# Patient Record
Sex: FEMALE | Race: WHITE
[De-identification: names, ages, dates, MRNs, and addresses within clinical notes are randomized per-mention and may not be internally consistent; named-entity substitution may affect disease eponyms.]

---

## 2018-12-13 ENCOUNTER — HOSPITAL ENCOUNTER (EMERGENCY)
Dept: HOSPITAL 8 - ED | Age: 38
Discharge: HOME | End: 2018-12-13
Payer: MEDICAID

## 2018-12-13 VITALS — SYSTOLIC BLOOD PRESSURE: 153 MMHG | DIASTOLIC BLOOD PRESSURE: 76 MMHG

## 2018-12-13 VITALS — WEIGHT: 257.94 LBS | HEIGHT: 68 IN | BODY MASS INDEX: 39.09 KG/M2

## 2018-12-13 DIAGNOSIS — M79.642: ICD-10-CM

## 2018-12-13 DIAGNOSIS — E11.9: ICD-10-CM

## 2018-12-13 DIAGNOSIS — L03.114: ICD-10-CM

## 2018-12-13 DIAGNOSIS — L03.113: Primary | ICD-10-CM

## 2018-12-13 DIAGNOSIS — M79.641: ICD-10-CM

## 2018-12-13 LAB
ALBUMIN SERPL-MCNC: 3.1 G/DL (ref 3.4–5)
ALP SERPL-CCNC: 80 U/L (ref 45–117)
ALT SERPL-CCNC: 27 U/L (ref 12–78)
ANION GAP SERPL CALC-SCNC: 8 MMOL/L (ref 5–15)
BASOPHILS # BLD AUTO: 0.03 X10^3/UL (ref 0–0.1)
BASOPHILS NFR BLD AUTO: 0 % (ref 0–1)
BILIRUB SERPL-MCNC: 0.2 MG/DL (ref 0.2–1)
CALCIUM SERPL-MCNC: 8.7 MG/DL (ref 8.5–10.1)
CHLORIDE SERPL-SCNC: 105 MMOL/L (ref 98–107)
CREAT SERPL-MCNC: 0.83 MG/DL (ref 0.55–1.02)
EOSINOPHIL # BLD AUTO: 0.18 X10^3/UL (ref 0–0.4)
EOSINOPHIL NFR BLD AUTO: 2 % (ref 1–7)
ERYTHROCYTE [DISTWIDTH] IN BLOOD BY AUTOMATED COUNT: 13.2 % (ref 9.6–15.2)
LYMPHOCYTES # BLD AUTO: 1.88 X10^3/UL (ref 1–3.4)
LYMPHOCYTES NFR BLD AUTO: 23 % (ref 22–44)
MCH RBC QN AUTO: 29.2 PG (ref 27–34.8)
MCHC RBC AUTO-ENTMCNC: 33.7 G/DL (ref 32.4–35.8)
MCV RBC AUTO: 86.6 FL (ref 80–100)
MD: (no result)
MONOCYTES # BLD AUTO: 0.59 X10^3/UL (ref 0.2–0.8)
MONOCYTES NFR BLD AUTO: 7 % (ref 2–9)
NEUTROPHILS # BLD AUTO: 5.59 X10^3/UL (ref 1.8–6.8)
NEUTROPHILS NFR BLD AUTO: 68 % (ref 42–75)
PLATELET # BLD AUTO: 253 X10^3/UL (ref 130–400)
PMV BLD AUTO: 9.4 FL (ref 7.4–10.4)
PROT SERPL-MCNC: 6.9 G/DL (ref 6.4–8.2)
RBC # BLD AUTO: 3.77 X10^6/UL (ref 3.82–5.3)

## 2018-12-13 PROCEDURE — 80053 COMPREHEN METABOLIC PANEL: CPT

## 2018-12-13 PROCEDURE — 85025 COMPLETE CBC W/AUTO DIFF WBC: CPT

## 2018-12-13 PROCEDURE — 99284 EMERGENCY DEPT VISIT MOD MDM: CPT

## 2018-12-13 PROCEDURE — 36415 COLL VENOUS BLD VENIPUNCTURE: CPT

## 2019-10-27 ENCOUNTER — HOSPITAL ENCOUNTER (EMERGENCY)
Facility: MEDICAL CENTER | Age: 39
End: 2019-10-28
Attending: EMERGENCY MEDICINE
Payer: MEDICAID

## 2019-10-27 DIAGNOSIS — R06.2 WHEEZING: ICD-10-CM

## 2019-10-27 DIAGNOSIS — J40 BRONCHITIS: ICD-10-CM

## 2019-10-27 PROCEDURE — 99284 EMERGENCY DEPT VISIT MOD MDM: CPT

## 2019-10-28 ENCOUNTER — APPOINTMENT (OUTPATIENT)
Dept: RADIOLOGY | Facility: MEDICAL CENTER | Age: 39
End: 2019-10-28
Attending: EMERGENCY MEDICINE
Payer: MEDICAID

## 2019-10-28 VITALS
BODY MASS INDEX: 38.76 KG/M2 | OXYGEN SATURATION: 98 % | RESPIRATION RATE: 18 BRPM | WEIGHT: 255.73 LBS | TEMPERATURE: 98.9 F | SYSTOLIC BLOOD PRESSURE: 147 MMHG | HEIGHT: 68 IN | HEART RATE: 95 BPM | DIASTOLIC BLOOD PRESSURE: 58 MMHG

## 2019-10-28 LAB
FLUAV RNA SPEC QL NAA+PROBE: NEGATIVE
FLUBV RNA SPEC QL NAA+PROBE: NEGATIVE

## 2019-10-28 PROCEDURE — 87502 INFLUENZA DNA AMP PROBE: CPT

## 2019-10-28 PROCEDURE — 700111 HCHG RX REV CODE 636 W/ 250 OVERRIDE (IP): Performed by: EMERGENCY MEDICINE

## 2019-10-28 PROCEDURE — 700101 HCHG RX REV CODE 250: Performed by: EMERGENCY MEDICINE

## 2019-10-28 PROCEDURE — 94640 AIRWAY INHALATION TREATMENT: CPT

## 2019-10-28 PROCEDURE — 71046 X-RAY EXAM CHEST 2 VIEWS: CPT

## 2019-10-28 RX ORDER — IPRATROPIUM BROMIDE AND ALBUTEROL SULFATE 2.5; .5 MG/3ML; MG/3ML
3 SOLUTION RESPIRATORY (INHALATION)
Status: COMPLETED | OUTPATIENT
Start: 2019-10-28 | End: 2019-10-28

## 2019-10-28 RX ORDER — PREDNISONE 20 MG/1
40 TABLET ORAL DAILY
Qty: 10 TAB | Refills: 0 | Status: SHIPPED | OUTPATIENT
Start: 2019-10-28 | End: 2019-11-02

## 2019-10-28 RX ORDER — ALBUTEROL SULFATE 90 UG/1
2 AEROSOL, METERED RESPIRATORY (INHALATION) EVERY 6 HOURS PRN
Qty: 8.5 G | Refills: 0 | Status: SHIPPED | OUTPATIENT
Start: 2019-10-28 | End: 2021-08-23

## 2019-10-28 RX ORDER — PREDNISONE 20 MG/1
60 TABLET ORAL ONCE
Status: COMPLETED | OUTPATIENT
Start: 2019-10-28 | End: 2019-10-28

## 2019-10-28 RX ADMIN — IPRATROPIUM BROMIDE AND ALBUTEROL SULFATE 3 ML: .5; 3 SOLUTION RESPIRATORY (INHALATION) at 00:16

## 2019-10-28 RX ADMIN — PREDNISONE 60 MG: 20 TABLET ORAL at 00:23

## 2019-10-28 NOTE — ED NOTES
Patient walked with a steady gate at this time to er [facility] area room  65. Placed patient into gown, gave warm blanket, and call light. Ready to be seen

## 2019-10-28 NOTE — ED PROVIDER NOTES
"ED Provider Note    CHIEF COMPLAINT  Chief Complaint   Patient presents with   • Cough     productive cough x 2 days       HPI  Jc Ramos is a 39 y.o. female who presents to the emergency department chief complaint of cough nasal congestion and fevers over the last 2 to 3 days.  Patient is an active smoker and used to use an inhaler but states she lost it.  She is been having a productive cough but is been no blood in the productive it is mostly clear phlegm.  She denies any chest pain any nausea or vomiting she is feeling fatigued and having body aches.  She did not get her flu shot.  She states that she has coughing attacks feels a little short of breath but otherwise she is breathing normally    REVIEW OF SYSTEMS  Positives as above. Pertinent negatives include ear pain throat pain nausea vomiting diarrhea rash leg swelling chest pain  All other review of systems are negative    PAST MEDICAL HISTORY   has a past medical history of Diabetes.    SOCIAL HISTORY  Social History     Tobacco Use   • Smoking status: Current Every Day Smoker   • Tobacco comment: 1/2 ppd since age 16   Substance and Sexual Activity   • Alcohol use: No   • Drug use: No   • Sexual activity: Not on file       SURGICAL HISTORY   has a past surgical history that includes cholecystectomy and c-sec only,prev c-sec.    CURRENT MEDICATIONS  Home Medications    **Home medications have not yet been reviewed for this encounter**         ALLERGIES  Allergies   Allergen Reactions   • Vicodin [Hydrocodone-Acetaminophen] Vomiting       PHYSICAL EXAM  VITAL SIGNS: BP (!) 164/95   Pulse (!) 124   Temp 37.2 °C (98.9 °F) (Oral)   Resp 18   Ht 1.727 m (5' 8\")   Wt 116 kg (255 lb 11.7 oz)   LMP 10/13/2019 (Approximate)   SpO2 98%   BMI 38.88 kg/m²    Pulse ox interpretation: I interpret this pulse ox as normal.  Constitutional: Alert in no apparent distress.  HENT: Normocephalic atraumatic, MMM, oropharynx clear uvula midline bilateral copious " nasal congestion  Eyes: PER, Conjunctiva normal, Non-icteric.   Neck: Normal range of motion, No tenderness, Supple, No stridor.   Cardiovascular: Regular rate and rhythm, no murmurs.   Thorax & Lungs: Coarse wheezes heard bilaterally with a productive cough no respiratory distress, No chest tenderness.   Abdomen: Bowel sounds normal, Soft, No tenderness, No pulsatile masses. No peritoneal signs.  Skin: Warm, Dry, No erythema, No rash.   Back: No bony tenderness, No CVA tenderness.   Extremities: Intact distal pulses, No edema, No tenderness, No cyanosis  Neurologic: Alert and oriented x3, No focal deficits noted.       DIFFERENTIAL DIAGNOSIS AND WORK UP PLAN    This is a 39 y.o. female who presents with history of tobacco use coarse wheezes and cough that is productive consistent most likely with bronchitis versus community acquired pneumonia versus influenza.  She is tachycardic but not hypoxic and on respiratory distress.  Will treat the patient with a DuoNeb evaluate for influenza and a chest x-ray. Low concern for the cough or tachycardia to be related to a pulmonary embolism especially in light of her generalized viral symptoms and wheezing heard on examination    DIAGNOSTIC STUDIES / PROCEDURES    LABS  Pertinent Lab Findings    Labs Reviewed   INFLUENZA A/B BY PCR       RADIOLOGY  DX-CHEST-2 VIEWS   Final Result      Normal chest.               INTERPRETING LOCATION: [address] [place] [address], [facility]        The radiologist's interpretation of all radiological studies have been reviewed by me.      COURSE & MEDICAL DECISION MAKING  Pertinent Labs & Imaging studies reviewed. (See chart for details)    1:03 AM  Reassessed patient at the bedside she is doing much better wheezing is actually gotten a little bit louder but more clear and she is moving more air bilaterally.  She still has a cough but states she is feeling better.  She will be sent home with steroid pulse as well as handheld albuterol for bronchitis we  "discussed the use of non-antibiotics at this point and she will return to the ED if symptoms are not starting to improve in the next 2 to 3 days.  She understands the cough may linger for 7 to 10 days as long she is beginning to feel less short of breath then she does not need to return    /58   Pulse 95   Temp 37.2 °C (98.9 °F) (Oral)   Resp 18   Ht 1.727 m (5' 8\")   Wt 116 kg (255 lb 11.7 oz)   LMP 10/13/2019 (Approximate)   SpO2 98%   BMI 38.88 kg/m²     The patient will return for new or worsening symptoms and is stable at the time of discharge.    The patient is referred to a primary physician for blood pressure management, diabetic screening, and for all other preventative health concerns.    DISPOSITION:  Patient will be discharged home in stable condition.    FOLLOW UP:  [facility], Emergency Dept  [address] [facility] [place] [address] [phone]  In 2 days  If symptoms worsen      OUTPATIENT MEDICATIONS:  Discharge Medication List as of 10/28/2019  1:07 AM      START taking these medications    Details   predniSONE (DELTASONE) 20 MG Tab Take 2 Tabs by mouth every day for 5 days., Disp-10 Tab, R-0, Print Rx Paper      albuterol 108 (90 Base) MCG/ACT Aero Soln inhalation aerosol Inhale 2 Puffs by mouth every 6 hours as needed for Shortness of Breath., Disp-8.5 g, R-0, Print Rx Paper               FINAL IMPRESSION  1. Bronchitis     2. Wheezing             Electronically signed by: Ava Ledbetter, 10/27/2019 11:57 PM    This dictation has been created using voice recognition software and/or scribes. The accuracy of the dictation is limited by the abilities of the software and the expertise of the scribes. I expect there may be some errors of grammar and possibly content. I made every attempt to manually correct the errors within my dictation. However, errors related to voice recognition software and/or scribes may still exist and should be interpreted within the " appropriate context.

## 2019-10-28 NOTE — ED NOTES
Pt given discharge instructions. Medication education provided.  Signed copy in chart. Pt states all belongings in possession. Pt ambulatory off unit with steady gait.

## 2019-11-04 ENCOUNTER — APPOINTMENT (OUTPATIENT)
Dept: RADIOLOGY | Facility: MEDICAL CENTER | Age: 39
End: 2019-11-04
Attending: EMERGENCY MEDICINE
Payer: MEDICAID

## 2019-11-04 ENCOUNTER — HOSPITAL ENCOUNTER (EMERGENCY)
Facility: MEDICAL CENTER | Age: 39
End: 2019-11-04
Attending: EMERGENCY MEDICINE
Payer: MEDICAID

## 2019-11-04 VITALS
HEIGHT: 68 IN | DIASTOLIC BLOOD PRESSURE: 97 MMHG | OXYGEN SATURATION: 96 % | WEIGHT: 255.73 LBS | BODY MASS INDEX: 38.76 KG/M2 | TEMPERATURE: 98.2 F | HEART RATE: 94 BPM | SYSTOLIC BLOOD PRESSURE: 159 MMHG | RESPIRATION RATE: 18 BRPM

## 2019-11-04 DIAGNOSIS — R51.9 ACUTE NONINTRACTABLE HEADACHE, UNSPECIFIED HEADACHE TYPE: ICD-10-CM

## 2019-11-04 DIAGNOSIS — J06.9 VIRAL URI WITH COUGH: ICD-10-CM

## 2019-11-04 PROCEDURE — 700102 HCHG RX REV CODE 250 W/ 637 OVERRIDE(OP): Performed by: EMERGENCY MEDICINE

## 2019-11-04 PROCEDURE — 71045 X-RAY EXAM CHEST 1 VIEW: CPT

## 2019-11-04 PROCEDURE — A9270 NON-COVERED ITEM OR SERVICE: HCPCS | Performed by: EMERGENCY MEDICINE

## 2019-11-04 PROCEDURE — 99284 EMERGENCY DEPT VISIT MOD MDM: CPT

## 2019-11-04 RX ORDER — BENZONATATE 100 MG/1
200 CAPSULE ORAL 3 TIMES DAILY PRN
Qty: 20 CAP | Refills: 0 | Status: SHIPPED | OUTPATIENT
Start: 2019-11-04 | End: 2021-08-23

## 2019-11-04 RX ORDER — BUTALBITAL, ACETAMINOPHEN AND CAFFEINE 50; 325; 40 MG/1; MG/1; MG/1
1 TABLET ORAL ONCE
Status: COMPLETED | OUTPATIENT
Start: 2019-11-04 | End: 2019-11-04

## 2019-11-04 RX ADMIN — BUTALBITAL, ACETAMINOPHEN, AND CAFFEINE 1 TABLET: 50; 325; 40 TABLET ORAL at 13:31

## 2019-11-04 NOTE — ED PROVIDER NOTES
"ED Provider Note    CHIEF COMPLAINT  Chief Complaint   Patient presents with   • Cough     for the last wk, productive green    • Headache   • Cold Symptoms       HPI  Jc Ramos is a 39 y.o. female who returns with cough, headache, nasal congestion over the past week, was evaluated here at the onset of her symptoms, had a negative flu swab and a negative chest x-ray.  She states the cough is continuing and actually worsening, productive green sputum.  She denies fever.  Some nausea but no vomiting or abdominal pain.  No back pain.  States the headache is worse when coughing.  No weakness numbness or tingling.  No visual changes.  History of diabetes, admits to poor compliance.    REVIEW OF SYSTEMS  Negative for fever, rash, chest pain, abdominal pain, vomiting, diarrhea, focal weakness, focal numbness, focal tingling, back pain. All other systems are negative.     PAST MEDICAL HISTORY  Past Medical History:   Diagnosis Date   • Diabetes        FAMILY HISTORY  History reviewed. No pertinent family history.    SOCIAL HISTORY  Social History     Tobacco Use   • Smoking status: Current Every Day Smoker     Packs/day: 0.50     Types: Cigarettes   • Smokeless tobacco: Never Used   • Tobacco comment: 1/2 ppd since age 16   Substance Use Topics   • Alcohol use: No   • Drug use: No       SURGICAL HISTORY  Past Surgical History:   Procedure Laterality Date   • CHOLECYSTECTOMY     • PB C-SEC ONLY,PBEV C-SEC         CURRENT MEDICATIONS  I personally reviewed the medication list in the charting documentation.     ALLERGIES  Allergies   Allergen Reactions   • Vicodin [Hydrocodone-Acetaminophen] Vomiting       MEDICAL RECORD  I have reviewed patient's medical record and pertinent results are listed above.      PHYSICAL EXAM  VITAL SIGNS: BP (!) 163/100   Pulse (!) 106   Temp 36.8 °C (98.2 °F) (Temporal)   Resp 18   Ht 1.727 m (5' 8\")   Wt 116 kg (255 lb 11.7 oz)   LMP 10/13/2019 (Approximate)   SpO2 93%   BMI 38.88 " kg/m²    Constitutional: Well appearing patient in no acute distress.  Not toxic, nor ill in appearance.  HENT: Mucus membranes moist.  No asymmetric pharyngeal edema, no exudates  Eyes: No scleral icterus. Normal conjunctiva   Neck: Supple, comfortable, nonpainful range of motion.   Cardiovascular: Minimal tachycardia, regular  Thorax & Lungs: Chest is nontender.  Lungs are clear to auscultation with good air movement bilaterally.  No wheeze, rhonchi, nor rales.   Abdomen: Soft, with no tenderness, rebound nor guarding.  No mass or pulsatile mass appreciated.  Skin: Warm, dry. No rash appreciated  Extremities/Musculoskeletal: No sign of trauma. No asymmetric calf tenderness, erythema or edema. Normal range of motion   Neurologic: Alert & oriented. No focal deficits observed.   Psychiatric: Normal affect appropriate for the clinical situation.    DIAGNOSTIC STUDIES / PROCEDURES    RADIOLOGY  DX-CHEST-LIMITED (1 VIEW)   Final Result      No acute cardiopulmonary disease.            COURSE & MEDICAL DECISION MAKING  I have reviewed any medical record information, laboratory studies and radiographic results as noted above.    Encounter Summary: This is a 39 y.o. female with a viral URI, this is including a cough, x-ray is negative for pneumonia.  She was negative for the flu a week ago.  At this point I treated with Fioricet for headache, I will prescribe Aleve-D as well as Tessalon for symptom control.  I have gone over strict return instructions and at this time she is stable and appropriate for discharge.      DISPOSITION: Discharged home in stable condition      FINAL IMPRESSION  1. Viral URI with cough    2. Acute nonintractable headache, unspecified headache type           This dictation was created using voice recognition software. The accuracy of the dictation is limited to the abilities of the software. I expect there may be some errors of grammar and possibly content. The nursing notes were reviewed and  certain aspects of this information were incorporated into this note.    Electronically signed by: Sanjiv Mathis, 11/4/2019 1:23 PM

## 2019-11-04 NOTE — ED NOTES
No acute change in condition since last entry.  Safety maintained.  Pt has been medicated per mar.

## 2019-11-04 NOTE — ED TRIAGE NOTES
Pt ambulated to triage with   Chief Complaint   Patient presents with   • Cough     for the last wk, productive green    • Headache      Pt Informed regarding triage process and verbalized understanding to inform triage tech or RN for any changes in condition. Placed in lobby.

## 2019-11-05 ENCOUNTER — PATIENT OUTREACH (OUTPATIENT)
Dept: HEALTH INFORMATION MANAGEMENT | Facility: OTHER | Age: 39
End: 2019-11-05

## 2019-11-05 ENCOUNTER — HOSPITAL ENCOUNTER (EMERGENCY)
Dept: HOSPITAL 8 - ED | Age: 39
LOS: 1 days | Discharge: HOME | End: 2019-11-06
Payer: MEDICAID

## 2019-11-05 VITALS — HEIGHT: 68 IN | WEIGHT: 255.52 LBS | BODY MASS INDEX: 38.72 KG/M2

## 2019-11-05 VITALS — DIASTOLIC BLOOD PRESSURE: 90 MMHG | SYSTOLIC BLOOD PRESSURE: 165 MMHG

## 2019-11-05 DIAGNOSIS — F17.200: ICD-10-CM

## 2019-11-05 DIAGNOSIS — E11.9: ICD-10-CM

## 2019-11-05 DIAGNOSIS — Z90.49: ICD-10-CM

## 2019-11-05 DIAGNOSIS — J18.0: Primary | ICD-10-CM

## 2019-11-05 DIAGNOSIS — M79.10: ICD-10-CM

## 2019-11-05 PROCEDURE — 71046 X-RAY EXAM CHEST 2 VIEWS: CPT

## 2019-11-05 PROCEDURE — 99283 EMERGENCY DEPT VISIT LOW MDM: CPT

## 2019-11-05 PROCEDURE — 94640 AIRWAY INHALATION TREATMENT: CPT

## 2019-11-05 NOTE — PROGRESS NOTES
CHW Timothy ([phone]) reached out to patient offering connection to community resources as well as scheduling and establishing with a PCP. Pt did not answer, she has been left VM with CHW's information to call and connect with PCP and any resources she may need.

## 2019-11-06 NOTE — NUR
TASK RN: PT MEDICATED PER EMAR. REPORTS IMPROVEMENT IN WOB/SOB WITH BREATHING 
TX. AWAITING DC INSTRUCTIONS

## 2019-11-06 NOTE — NUR
TASK RN: PT REPORTS HAVING TAKEN ZITHROMAX BEFORE WITHOUT DIFFICULTY. PT 
REQUESTING DC. AWARE OF S/S OF ABX RXN. 

DC EDUCATION PROVIDED, PT DEMONSTRATES UNDERSTANDING. PT AMBULATED STEADILY TO 
DC WITH RN

## 2019-11-08 NOTE — PROGRESS NOTES
CHW made 2nd follow-up call to patient reaching out for services. She would benefit from regular PCP visits, and CHW has left information on importance of establishing PCP and instructions to call CHW back for resources.

## 2019-11-13 NOTE — PROGRESS NOTES
CHW left 3rd VM for patient offering assistance to PCP, other follow-up appointments, and community resources she may benefit from. Jc was given CHW information to call back if interested in services.   This was the 3rd attempt to reach patient; she will be discharged from current CHW list unless patient reaches-out for services or presents again in the ED.

## 2020-02-07 ENCOUNTER — HOSPITAL ENCOUNTER (EMERGENCY)
Dept: HOSPITAL 8 - ED | Age: 40
Discharge: HOME | End: 2020-02-07
Payer: MEDICAID

## 2020-02-07 VITALS — WEIGHT: 268.96 LBS | HEIGHT: 68 IN | BODY MASS INDEX: 40.76 KG/M2

## 2020-02-07 VITALS — SYSTOLIC BLOOD PRESSURE: 193 MMHG | DIASTOLIC BLOOD PRESSURE: 97 MMHG

## 2020-02-07 DIAGNOSIS — K08.89: Primary | ICD-10-CM

## 2020-02-07 DIAGNOSIS — E11.65: ICD-10-CM

## 2020-02-07 DIAGNOSIS — Z90.49: ICD-10-CM

## 2020-02-07 DIAGNOSIS — F17.200: ICD-10-CM

## 2020-02-07 PROCEDURE — 99283 EMERGENCY DEPT VISIT LOW MDM: CPT

## 2020-02-07 PROCEDURE — 82962 GLUCOSE BLOOD TEST: CPT

## 2020-02-07 PROCEDURE — 96372 THER/PROPH/DIAG INJ SC/IM: CPT

## 2020-05-22 ENCOUNTER — HOSPITAL ENCOUNTER (EMERGENCY)
Facility: MEDICAL CENTER | Age: 40
End: 2020-05-22
Attending: EMERGENCY MEDICINE
Payer: MEDICAID

## 2020-05-22 VITALS
BODY MASS INDEX: 41.43 KG/M2 | HEART RATE: 99 BPM | SYSTOLIC BLOOD PRESSURE: 168 MMHG | HEIGHT: 68 IN | RESPIRATION RATE: 18 BRPM | WEIGHT: 273.37 LBS | OXYGEN SATURATION: 98 % | TEMPERATURE: 98.1 F | DIASTOLIC BLOOD PRESSURE: 92 MMHG

## 2020-05-22 DIAGNOSIS — K04.7 DENTAL ABSCESS: ICD-10-CM

## 2020-05-22 PROCEDURE — A9270 NON-COVERED ITEM OR SERVICE: HCPCS | Performed by: EMERGENCY MEDICINE

## 2020-05-22 PROCEDURE — 700102 HCHG RX REV CODE 250 W/ 637 OVERRIDE(OP): Performed by: EMERGENCY MEDICINE

## 2020-05-22 PROCEDURE — 99283 EMERGENCY DEPT VISIT LOW MDM: CPT

## 2020-05-22 RX ORDER — OXYCODONE HYDROCHLORIDE AND ACETAMINOPHEN 5; 325 MG/1; MG/1
1 TABLET ORAL ONCE
Status: COMPLETED | OUTPATIENT
Start: 2020-05-22 | End: 2020-05-22

## 2020-05-22 RX ORDER — OXYCODONE HYDROCHLORIDE AND ACETAMINOPHEN 5; 325 MG/1; MG/1
1 TABLET ORAL EVERY 4 HOURS PRN
Qty: 15 TAB | Refills: 0 | Status: SHIPPED | OUTPATIENT
Start: 2020-05-22 | End: 2020-05-25

## 2020-05-22 RX ORDER — AMOXICILLIN AND CLAVULANATE POTASSIUM 875; 125 MG/1; MG/1
1 TABLET, FILM COATED ORAL 2 TIMES DAILY
Qty: 14 TAB | Refills: 0 | Status: SHIPPED | OUTPATIENT
Start: 2020-05-22 | End: 2020-05-29

## 2020-05-22 RX ORDER — AMOXICILLIN AND CLAVULANATE POTASSIUM 875; 125 MG/1; MG/1
1 TABLET, FILM COATED ORAL ONCE
Status: COMPLETED | OUTPATIENT
Start: 2020-05-22 | End: 2020-05-22

## 2020-05-22 RX ADMIN — AMOXICILLIN AND CLAVULANATE POTASSIUM 1 TABLET: 875; 125 TABLET, FILM COATED ORAL at 02:33

## 2020-05-22 RX ADMIN — OXYCODONE HYDROCHLORIDE AND ACETAMINOPHEN 1 TABLET: 5; 325 TABLET ORAL at 02:33

## 2020-05-22 NOTE — ED NOTES
Pt medicated per MAR.  Pt discharged home. Explained discharge and medication instructions. Questions and comments addressed. Pt verbalized understanding of instructions. Pt advised to follow-up with PCP or return to ED for any new or worsening of symptoms. Pt is ambulating well and steady on feet. VSS.

## 2020-05-22 NOTE — ED NOTES
Agree with triage note. Pt w/ significant left lower jaw swelling, pain with swallowing. Able to manage secretions. attached to monitor. VSS

## 2020-05-22 NOTE — ED PROVIDER NOTES
"ED Provider Note    CHIEF COMPLAINT  Chief Complaint   Patient presents with   • Oral Swelling     Left lower jaw/ dental swelling for three days, denies trauma to site, pain at site     Seen at 2:32 AM    HPI  Jc Ramos is a 39 y.o. female who presents with few days have left-sided facial swelling and pain secondary to an infected tooth, it abruptly worsened over the past 12 hours.  The patient has been taking Tylenol and ibuprofen with some improvement in the pain but no resolution of the swelling.  She has had this happen in the past and was treated with antibiotics.  She never underwent extraction.  She does not have a dentist or oral surgeon.    She denies any fevers, chills, difficulty opening her mouth or difficulty swallowing.  No history of impaired immunity.    REVIEW OF SYSTEMS  See HPI  PAST MEDICAL HISTORY   has a past medical history of Diabetes.    SOCIAL HISTORY  Social History     Tobacco Use   • Smoking status: Current Every Day Smoker     Packs/day: 0.50     Types: Cigarettes   • Smokeless tobacco: Never Used   • Tobacco comment: 1/2 ppd since age 16   Substance and Sexual Activity   • Alcohol use: No   • Drug use: No   • Sexual activity: Not on file       SURGICAL HISTORY   has a past surgical history that includes cholecystectomy and c-sec only,prev c-sec.    CURRENT MEDICATIONS  Reviewed.  See Encounter Summary.     ALLERGIES  Allergies   Allergen Reactions   • Vicodin [Hydrocodone-Acetaminophen] Vomiting       PHYSICAL EXAM  VITAL SIGNS: BP (!) 168/92   Pulse 99   Temp 36.7 °C (98.1 °F) (Temporal)   Resp 18   Ht 1.727 m (5' 8\")   Wt 124 kg (273 lb 5.9 oz)   SpO2 98%   BMI 41.57 kg/m²   Constitutional: Awake, alert in no apparent distress.  HENT: Normocephalic, Bilateral external ears normal. Nose normal.  No trismus, there is prominent left sided facial swelling over the mandible.  The floor the oropharynx is normal and nontender, the patient has an obvious odontogenic abscess " adjacent to the left 12-year mandibular molar.  Infected tooth is eroded down to the gingival lining.  The neck is supple without any anterior tenderness or swelling.  No cervical lymphadenopathy.  Normal speech.  Eyes: Conjunctiva normal, non-icteric, EOMI.    Thorax & Lungs: Easy unlabored respirations  Cardiovascular: Borderline tachycardic.  Abdomen:  No distention  Skin: Visualized skin is  Dry, No erythema, No rash.   Extremities:   atraumatic   Neurologic: Alert, Grossly non-focal.   Psychiatric: Affect and Mood normal    RADIOLOGY  No orders to display       Nursing notes and vital signs were reviewed. (See chart for details)    Decision Making:  This is a 39 y.o. year old female who presents with a moderate left-sided odontogenic abscess.  The patient will need an oral surgeon for definitive treatment.  The patient may benefit from antibiotics along with hopeful reduction in the abscess.  I explained that she needs to watch this very closely.  If she develops any trismus, neck pain or difficulty swallowing this needs to be reevaluated emergently.  If she has no improvement or worsening after 48 hours this also needs to be reevaluated.  She was given the first dose in the emergency department and I have sent a prescription to her pharmacy.    I gave her the number for the oral surgeon and recommend she call early this morning for a same-day appointment if possible.  If she cannot be seen in the clinic and she has no improved with her symptoms she will need to return the emergency department for repeat evaluation.  I recommend she do that during the normal business hours as it is much easier to obtain an oral surgeon evaluation if this is done during the daylight.    In prescribing controlled substances to this patient, I certify that I have obtained and reviewed the medical history of Jc Ramos. I have also made a good geo effort to obtain applicable records from other providers who have treated the  patient and records did not demonstrate any increased risk of substance abuse that would prevent me from prescribing controlled substances.     I have conducted a physical exam and documented it. I have reviewed Ms. Ramos’s prescription history as maintained by the [place] Prescription Monitoring Program.     I have assessed the patient’s risk for abuse, dependency, and addiction using the validated Opioid Risk Tool available at https://www.mdcalc.com/udzjpi-iabn-pjan-ort-narcotic-abuse.     Given the above, I believe the benefits of controlled substance therapy outweigh the risks. The reasons for prescribing controlled substances include non-narcotic, oral analgesic alternatives have been inadequate for pain control. Accordingly, I have discussed the risk and benefits, treatment plan, and alternative therapies with the patient.         DISPOSITION:  Patient will be discharged home in good condition.    Discharge Medications:  Discharge Medication List as of 5/22/2020  2:26 AM      START taking these medications    Details   oxyCODONE-acetaminophen (PERCOCET) 5-325 MG Tab Take 1 Tab by mouth every four hours as needed for up to 3 days., Disp-15 Tab,R-0, Normal      amoxicillin-clavulanate (AUGMENTIN) 875-125 MG Tab Take 1 Tab by mouth 2 times a day for 7 days., Disp-14 Tab,R-0, Normal             The patient was discharged home (see d/c instructions) and told to return immediately for any signs or symptoms listed, or any worsening at all.  The patient verbally agreed to the discharge precautions and follow-up plan which is documented in EPIC.    The patient's blood pressure is elevated today. >120/80. I have referred them to primary care for follow up.       FINAL IMPRESSION  1. Dental abscess

## 2020-05-22 NOTE — ED TRIAGE NOTES
Chief Complaint   Patient presents with   • Oral Swelling     Left lower jaw/ dental swelling for three days, denies trauma to site, pain at site

## 2020-06-28 ENCOUNTER — HOSPITAL ENCOUNTER (EMERGENCY)
Dept: HOSPITAL 8 - ED | Age: 40
Discharge: HOME | End: 2020-06-28
Payer: MEDICAID

## 2020-06-28 VITALS — SYSTOLIC BLOOD PRESSURE: 124 MMHG | DIASTOLIC BLOOD PRESSURE: 84 MMHG

## 2020-06-28 VITALS — WEIGHT: 209.44 LBS | BODY MASS INDEX: 31.74 KG/M2 | HEIGHT: 68 IN

## 2020-06-28 DIAGNOSIS — R11.2: ICD-10-CM

## 2020-06-28 DIAGNOSIS — R19.7: ICD-10-CM

## 2020-06-28 DIAGNOSIS — G43.C0: Primary | ICD-10-CM

## 2020-06-28 DIAGNOSIS — Z90.49: ICD-10-CM

## 2020-06-28 DIAGNOSIS — E11.9: ICD-10-CM

## 2020-06-28 DIAGNOSIS — M79.10: ICD-10-CM

## 2020-06-28 DIAGNOSIS — Z98.51: ICD-10-CM

## 2020-06-28 LAB
ALBUMIN SERPL-MCNC: 3 G/DL (ref 3.4–5)
ALP SERPL-CCNC: 89 U/L (ref 45–117)
ALT SERPL-CCNC: 19 U/L (ref 12–78)
ANION GAP SERPL CALC-SCNC: 5 MMOL/L (ref 5–15)
BASOPHILS # BLD AUTO: 0 X10^3/UL (ref 0–0.1)
BASOPHILS NFR BLD AUTO: 0 % (ref 0–1)
BILIRUB SERPL-MCNC: 0.6 MG/DL (ref 0.2–1)
CALCIUM SERPL-MCNC: 8 MG/DL (ref 8.5–10.1)
CHLORIDE SERPL-SCNC: 105 MMOL/L (ref 98–107)
CREAT SERPL-MCNC: 0.7 MG/DL (ref 0.55–1.02)
EOSINOPHIL # BLD AUTO: 0.02 X10^3/UL (ref 0–0.4)
EOSINOPHIL NFR BLD AUTO: 0 % (ref 1–7)
ERYTHROCYTE [DISTWIDTH] IN BLOOD BY AUTOMATED COUNT: 14.1 % (ref 9.6–15.2)
LYMPHOCYTES # BLD AUTO: 0.52 X10^3/UL (ref 1–3.4)
LYMPHOCYTES NFR BLD AUTO: 9 % (ref 22–44)
MCH RBC QN AUTO: 28.1 PG (ref 27–34.8)
MCHC RBC AUTO-ENTMCNC: 32.4 G/DL (ref 32.4–35.8)
MCV RBC AUTO: 86.9 FL (ref 80–100)
MD: NO
MONOCYTES # BLD AUTO: 0.43 X10^3/UL (ref 0.2–0.8)
MONOCYTES NFR BLD AUTO: 7 % (ref 2–9)
NEUTROPHILS # BLD AUTO: 5.04 X10^3/UL (ref 1.8–6.8)
NEUTROPHILS NFR BLD AUTO: 84 % (ref 42–75)
PLATELET # BLD AUTO: 211 X10^3/UL (ref 130–400)
PMV BLD AUTO: 9.8 FL (ref 7.4–10.4)
PROT SERPL-MCNC: 6.8 G/DL (ref 6.4–8.2)
RBC # BLD AUTO: 4.97 X10^6/UL (ref 3.82–5.3)

## 2020-06-28 PROCEDURE — 80053 COMPREHEN METABOLIC PANEL: CPT

## 2020-06-28 PROCEDURE — 96374 THER/PROPH/DIAG INJ IV PUSH: CPT

## 2020-06-28 PROCEDURE — 85025 COMPLETE CBC W/AUTO DIFF WBC: CPT

## 2020-06-28 PROCEDURE — 99285 EMERGENCY DEPT VISIT HI MDM: CPT

## 2020-06-28 PROCEDURE — 71045 X-RAY EXAM CHEST 1 VIEW: CPT

## 2020-06-28 PROCEDURE — 83690 ASSAY OF LIPASE: CPT

## 2020-06-28 PROCEDURE — 36415 COLL VENOUS BLD VENIPUNCTURE: CPT

## 2020-06-28 PROCEDURE — 84703 CHORIONIC GONADOTROPIN ASSAY: CPT

## 2020-06-28 PROCEDURE — 96372 THER/PROPH/DIAG INJ SC/IM: CPT

## 2020-06-28 PROCEDURE — 96375 TX/PRO/DX INJ NEW DRUG ADDON: CPT

## 2020-06-28 PROCEDURE — 96361 HYDRATE IV INFUSION ADD-ON: CPT

## 2020-06-28 RX ADMIN — ONDANSETRON ONE MG: 2 INJECTION, SOLUTION INTRAMUSCULAR; INTRAVENOUS at 09:18

## 2020-06-28 RX ADMIN — ONDANSETRON ONE MG: 2 INJECTION, SOLUTION INTRAMUSCULAR; INTRAVENOUS at 08:56

## 2020-06-28 NOTE — NUR
PATIENT AMBULATORY INTO EMERGENCY ROOM; COMPLAINTS OF FLU LIKE SYMPTOMS. NO 
FEVER, NO SHORTNESS OF BREATH. PATIENT STATED THAT SHE DOES HAVE A HISTORY OF 
DIABETES, SHOULD BE ON METFORMIN 500MG TWICE A DAY. DOES NOT HAVE THESE 
MEDICATIONS AVAILABLE.

## 2020-06-28 NOTE — NUR
PT AMBULATED BACK TO ROOM FROM BR W/ A STEADY GAIT. UNABLE TO PROVIDE URINE 
SAMPLE AT THIS TIME. CONENCTED TO MONITORING, VSS.

## 2021-03-19 ENCOUNTER — HOSPITAL ENCOUNTER (EMERGENCY)
Facility: MEDICAL CENTER | Age: 41
End: 2021-03-19
Attending: EMERGENCY MEDICINE
Payer: MEDICAID

## 2021-03-19 VITALS
SYSTOLIC BLOOD PRESSURE: 149 MMHG | DIASTOLIC BLOOD PRESSURE: 92 MMHG | HEIGHT: 68 IN | BODY MASS INDEX: 40.09 KG/M2 | RESPIRATION RATE: 20 BRPM | OXYGEN SATURATION: 96 % | HEART RATE: 91 BPM | TEMPERATURE: 98.4 F | WEIGHT: 264.55 LBS

## 2021-03-19 DIAGNOSIS — B96.89 GARDNERELLA ASSOCIATED VAGINAL DISCHARGE: ICD-10-CM

## 2021-03-19 DIAGNOSIS — N76.0 BACTERIAL VAGINOSIS: ICD-10-CM

## 2021-03-19 DIAGNOSIS — B96.89 BACTERIAL VAGINOSIS: ICD-10-CM

## 2021-03-19 DIAGNOSIS — N76.0 GARDNERELLA ASSOCIATED VAGINAL DISCHARGE: ICD-10-CM

## 2021-03-19 LAB
ALBUMIN SERPL BCP-MCNC: 4.2 G/DL (ref 3.2–4.9)
ALBUMIN/GLOB SERPL: 1.1 G/DL
ALP SERPL-CCNC: 101 U/L (ref 30–99)
ALT SERPL-CCNC: 12 U/L (ref 2–50)
ANION GAP SERPL CALC-SCNC: 10 MMOL/L (ref 7–16)
APPEARANCE UR: CLEAR
AST SERPL-CCNC: 15 U/L (ref 12–45)
BASOPHILS # BLD AUTO: 0.3 % (ref 0–1.8)
BASOPHILS # BLD: 0.03 K/UL (ref 0–0.12)
BILIRUB SERPL-MCNC: 0.4 MG/DL (ref 0.1–1.5)
BILIRUB UR QL STRIP.AUTO: NEGATIVE
BUN SERPL-MCNC: 8 MG/DL (ref 8–22)
CALCIUM SERPL-MCNC: 9.4 MG/DL (ref 8.5–10.5)
CANDIDA DNA VAG QL PROBE+SIG AMP: NEGATIVE
CHLORIDE SERPL-SCNC: 99 MMOL/L (ref 96–112)
CO2 SERPL-SCNC: 26 MMOL/L (ref 20–33)
COLOR UR: YELLOW
CREAT SERPL-MCNC: 0.63 MG/DL (ref 0.5–1.4)
EOSINOPHIL # BLD AUTO: 0.05 K/UL (ref 0–0.51)
EOSINOPHIL NFR BLD: 0.5 % (ref 0–6.9)
ERYTHROCYTE [DISTWIDTH] IN BLOOD BY AUTOMATED COUNT: 42.1 FL (ref 35.9–50)
G VAGINALIS DNA VAG QL PROBE+SIG AMP: POSITIVE
GLOBULIN SER CALC-MCNC: 3.8 G/DL (ref 1.9–3.5)
GLUCOSE SERPL-MCNC: 193 MG/DL (ref 65–99)
GLUCOSE UR STRIP.AUTO-MCNC: NEGATIVE MG/DL
HCG SERPL QL: NEGATIVE
HCT VFR BLD AUTO: 41.4 % (ref 37–47)
HGB BLD-MCNC: 12.9 G/DL (ref 12–16)
IMM GRANULOCYTES # BLD AUTO: 0.04 K/UL (ref 0–0.11)
IMM GRANULOCYTES NFR BLD AUTO: 0.4 % (ref 0–0.9)
KETONES UR STRIP.AUTO-MCNC: NEGATIVE MG/DL
LEUKOCYTE ESTERASE UR QL STRIP.AUTO: NEGATIVE
LYMPHOCYTES # BLD AUTO: 1.36 K/UL (ref 1–4.8)
LYMPHOCYTES NFR BLD: 13.6 % (ref 22–41)
MCH RBC QN AUTO: 27.2 PG (ref 27–33)
MCHC RBC AUTO-ENTMCNC: 31.2 G/DL (ref 33.6–35)
MCV RBC AUTO: 87.3 FL (ref 81.4–97.8)
MICRO URNS: NORMAL
MONOCYTES # BLD AUTO: 0.66 K/UL (ref 0–0.85)
MONOCYTES NFR BLD AUTO: 6.6 % (ref 0–13.4)
NEUTROPHILS # BLD AUTO: 7.88 K/UL (ref 2–7.15)
NEUTROPHILS NFR BLD: 78.6 % (ref 44–72)
NITRITE UR QL STRIP.AUTO: NEGATIVE
NRBC # BLD AUTO: 0 K/UL
NRBC BLD-RTO: 0 /100 WBC
PH UR STRIP.AUTO: 5 [PH] (ref 5–8)
PLATELET # BLD AUTO: 235 K/UL (ref 164–446)
PMV BLD AUTO: 11.4 FL (ref 9–12.9)
POTASSIUM SERPL-SCNC: 3.7 MMOL/L (ref 3.6–5.5)
PROT SERPL-MCNC: 8 G/DL (ref 6–8.2)
PROT UR QL STRIP: NEGATIVE MG/DL
RBC # BLD AUTO: 4.74 M/UL (ref 4.2–5.4)
RBC UR QL AUTO: NEGATIVE
SODIUM SERPL-SCNC: 135 MMOL/L (ref 135–145)
SP GR UR STRIP.AUTO: 1.03
T VAGINALIS DNA VAG QL PROBE+SIG AMP: NEGATIVE
UROBILINOGEN UR STRIP.AUTO-MCNC: 0.2 MG/DL
WBC # BLD AUTO: 10 K/UL (ref 4.8–10.8)

## 2021-03-19 PROCEDURE — 99285 EMERGENCY DEPT VISIT HI MDM: CPT | Mod: EDC

## 2021-03-19 PROCEDURE — 87591 N.GONORRHOEAE DNA AMP PROB: CPT

## 2021-03-19 PROCEDURE — 87480 CANDIDA DNA DIR PROBE: CPT

## 2021-03-19 PROCEDURE — 87491 CHLMYD TRACH DNA AMP PROBE: CPT

## 2021-03-19 PROCEDURE — 96374 THER/PROPH/DIAG INJ IV PUSH: CPT | Mod: EDC

## 2021-03-19 PROCEDURE — 87510 GARDNER VAG DNA DIR PROBE: CPT

## 2021-03-19 PROCEDURE — 700111 HCHG RX REV CODE 636 W/ 250 OVERRIDE (IP): Performed by: EMERGENCY MEDICINE

## 2021-03-19 PROCEDURE — 85025 COMPLETE CBC W/AUTO DIFF WBC: CPT

## 2021-03-19 PROCEDURE — 96375 TX/PRO/DX INJ NEW DRUG ADDON: CPT | Mod: EDC

## 2021-03-19 PROCEDURE — 80053 COMPREHEN METABOLIC PANEL: CPT

## 2021-03-19 PROCEDURE — 81003 URINALYSIS AUTO W/O SCOPE: CPT

## 2021-03-19 PROCEDURE — 87660 TRICHOMONAS VAGIN DIR PROBE: CPT

## 2021-03-19 PROCEDURE — 700105 HCHG RX REV CODE 258: Performed by: EMERGENCY MEDICINE

## 2021-03-19 PROCEDURE — 84703 CHORIONIC GONADOTROPIN ASSAY: CPT

## 2021-03-19 RX ORDER — HYDROMORPHONE HYDROCHLORIDE 1 MG/ML
0.5 INJECTION, SOLUTION INTRAMUSCULAR; INTRAVENOUS; SUBCUTANEOUS ONCE
Status: COMPLETED | OUTPATIENT
Start: 2021-03-19 | End: 2021-03-19

## 2021-03-19 RX ORDER — SODIUM CHLORIDE 9 MG/ML
1000 INJECTION, SOLUTION INTRAVENOUS ONCE
Status: COMPLETED | OUTPATIENT
Start: 2021-03-19 | End: 2021-03-19

## 2021-03-19 RX ORDER — ONDANSETRON 2 MG/ML
4 INJECTION INTRAMUSCULAR; INTRAVENOUS ONCE
Status: COMPLETED | OUTPATIENT
Start: 2021-03-19 | End: 2021-03-19

## 2021-03-19 RX ORDER — METRONIDAZOLE 500 MG/1
500 TABLET ORAL 3 TIMES DAILY
Qty: 21 TABLET | Refills: 0 | Status: SHIPPED | OUTPATIENT
Start: 2021-03-19 | End: 2021-03-26

## 2021-03-19 RX ADMIN — ONDANSETRON 4 MG: 2 INJECTION INTRAMUSCULAR; INTRAVENOUS at 10:37

## 2021-03-19 RX ADMIN — SODIUM CHLORIDE 1000 ML: 9 INJECTION, SOLUTION INTRAVENOUS at 10:31

## 2021-03-19 RX ADMIN — HYDROMORPHONE HYDROCHLORIDE 0.5 MG: 1 INJECTION, SOLUTION INTRAMUSCULAR; INTRAVENOUS; SUBCUTANEOUS at 10:37

## 2021-03-19 SDOH — ECONOMIC STABILITY: FOOD INSECURITY: WITHIN THE PAST 12 MONTHS, THE FOOD YOU BOUGHT JUST DIDN'T LAST AND YOU DIDN'T HAVE MONEY TO GET MORE.: OFTEN TRUE

## 2021-03-19 SDOH — ECONOMIC STABILITY: FOOD INSECURITY: WITHIN THE PAST 12 MONTHS, YOU WORRIED THAT YOUR FOOD WOULD RUN OUT BEFORE YOU GOT MONEY TO BUY MORE.: OFTEN TRUE

## 2021-03-19 SDOH — ECONOMIC STABILITY: TRANSPORTATION INSECURITY
IN THE PAST 12 MONTHS, HAS THE LACK OF TRANSPORTATION KEPT YOU FROM MEDICAL APPOINTMENTS OR FROM GETTING MEDICATIONS?: YES

## 2021-03-19 SDOH — ECONOMIC STABILITY: INCOME INSECURITY: HOW HARD IS IT FOR YOU TO PAY FOR THE VERY BASICS LIKE FOOD, HOUSING, MEDICAL CARE, AND HEATING?: HARD

## 2021-03-19 SDOH — ECONOMIC STABILITY: TRANSPORTATION INSECURITY
IN THE PAST 12 MONTHS, HAS LACK OF TRANSPORTATION KEPT YOU FROM MEETINGS, WORK, OR FROM GETTING THINGS NEEDED FOR DAILY LIVING?: NO

## 2021-03-19 NOTE — ED NOTES
Jc Ramos D/Angel.  Discharge instructions including s/s to return to ED, follow up appointments, hydration importance and medication administration provided to pt.    Patient verbalized understanding with no further questions and concerns.    Copy of discharge provided to pt.  Signed copy in chart.    Prescription for Flagyl provided to pt. Prescription for Motrin sent to pharmacy via e-prescribe.  Pt ambulated out of department; pt in NAD, awake, alert, interactive and appropriate.

## 2021-03-19 NOTE — LETTER
3/22/2021               Jc Ramos  [address] [facility] [address]        Dear Araleida (MR#[id number]):    As we have been unable to contact you by phone, this letter is sent in regards to your recent visit to the [facility] Emergency Department on 3/19/2021.  During the visit, tests were performed to assist the physician in a medical diagnosis.  A review of those tests requires that we notify you of the following:    YOUR CULTURE AND SENSITIVITY RESULT WAS POSITIVE FOR A SEXUALLY TRANSMITTIED BACTERIA - Neisseria gonorrhoeae and Chlamydia trachomitis. THIS WAS NOT TREATED IN THE EMERGENCY DEPARTMENT OR ON DISCHARGE AND REQUIRES TREATMENT.       Based on the above findings, it is important that you follow up with your primary care physician or call me at the number listed below for treatment in order to prevent long term health issues.  It is recommended that you seek testing for the presence of additional sexually transmitted diseases from the Health Department. Also, it is advised that you inform your sexual partner(s) within the previous 60 days of the above findings and direct them to the Health Department to test for sexually transmitted diseases.        Thank you for your cooperation in the matter.    Sincerely,  ED Culture Follow-Up Staff  Gerber Brooks, PharmD,     [facility], Emergency Department  [address] [place], [place] [address]  [phone] (ED Culture Line)  [phone]

## 2021-03-19 NOTE — ED NOTES
Pt ambulated to Peds 50. Pt asked to change into gown. Physical assessment completed. Call light within reach.

## 2021-03-19 NOTE — ED TRIAGE NOTES
"..  Chief Complaint   Patient presents with   • Pelvic Pain     x's 3-4 days radiating to mid abd.      Reports painful urination     ..BP (!) 170/99   Pulse (!) 104   Temp 36.7 °C (98 °F) (Temporal)   Resp 16   Ht 1.727 m (5' 8\")   Wt 120 kg (264 lb 8.8 oz)   SpO2 94%     ..Explained triage process, to waiting room. Asked to inform RN if questions or concerns arise.   "

## 2021-03-19 NOTE — ED PROVIDER NOTES
ED Provider Note    CHIEF COMPLAINT  Chief Complaint   Patient presents with   • Pelvic Pain     x's 3-4 days radiating to mid abd.        HPI  Jc Ramos is a 40 y.o. female who presents for evaluation of 3 to 4 days of lower abdominal pain, burning and pain with urination.  The patient has a history of non-insulin-dependent diabetes.  She has not been taking her Metformin.  She has her tubes tied but is sexually active with a relatively new partner.  She denies vaginal bleeding or discharge.  No high fevers or chills.  She does report some mild flank pain.  No associated rash.  No recent trauma.  Symptoms have been present for 3 to 4 days.    REVIEW OF SYSTEMS  See HPI for further details.  No high fevers headache night sweats weight loss all other systems are negative.     PAST MEDICAL HISTORY  Past Medical History:   Diagnosis Date   • Diabetes        FAMILY HISTORY  Noncontributory  SOCIAL HISTORY  Social History     Socioeconomic History   • Marital status:      Spouse name: Not on file   • Number of children: Not on file   • Years of education: Not on file   • Highest education level: Not on file   Occupational History   • Not on file   Tobacco Use   • Smoking status: Current Every Day Smoker     Packs/day: 0.50     Types: Cigarettes   • Smokeless tobacco: Never Used   • Tobacco comment: 1/2 ppd since age 16   Substance and Sexual Activity   • Alcohol use: No   • Drug use: No   • Sexual activity: Not on file   Other Topics Concern   • Not on file   Social History Narrative   • Not on file     Social Determinants of Health     Financial Resource Strain: High Risk   • Difficulty of Paying Living Expenses: Hard   Food Insecurity: Food Insecurity Present   • Worried About Running Out of Food in the Last Year: Often true   • Ran Out of Food in the Last Year: Often true   Transportation Needs: Unmet Transportation Needs   • Lack of Transportation (Medical): Yes   • Lack of Transportation (Non-Medical):  "No   Physical Activity:    • Days of Exercise per Week:    • Minutes of Exercise per Session:    Stress:    • Feeling of Stress :    Social Connections:    • Frequency of Communication with Friends and Family:    • Frequency of Social Gatherings with Friends and Family:    • Attends [employer] Services:    • Active Member of Clubs or Organizations:    • Attends Club or Organization Meetings:    • Marital Status:    Intimate Partner Violence:    • Fear of Current or Ex-Partner:    • Emotionally Abused:    • Physically Abused:    • Sexually Abused:      No report of drug or alcohol abuse  SURGICAL HISTORY  Past Surgical History:   Procedure Laterality Date   • CHOLECYSTECTOMY     • PB C-SEC ONLY,PBEV C-SEC         CURRENT MEDICATIONS  Home Medications     Reviewed by Blanca Aguirre R.N. (Registered Nurse) on 03/19/21 at 0922  Med List Status: Not Addressed   Medication Last Dose Status   albuterol 108 (90 Base) MCG/ACT Aero Soln inhalation aerosol  Active   benzonatate (TESSALON) 100 MG Cap  Active   busPIRone (BUSPAR) 10 MG Tab  Active   busPIRone (BUSPAR) 5 MG Tab  Active   cyclobenzaprine (FLEXERIL) 10 MG Tab  Active   lorazepam (ATIVAN) 1 MG Tab  Active   meclizine (ANTIVERT) 25 MG Tab  Active   metformin (GLUCOPHAGE) 500 MG Tab  Active   metformin (GLUCOPHAGE) 500 MG TABS  Active   paroxetine (PAXIL) 20 MG Tab  Active   paroxetine (PAXIL) 20 MG Tab  Active   Pseudoephedrine-Naproxen Na (ALEVE-D SINUS & COLD) 120-220 MG TABLET SR 12 HR  Active   trazodone (DESYREL) 50 MG Tab  Active   trazodone (DESYREL) 50 MG TABS  Active                ALLERGIES  Allergies   Allergen Reactions   • Vicodin [Hydrocodone-Acetaminophen] Vomiting       PHYSICAL EXAM  VITAL SIGNS: BP (!) 167/89   Pulse 98   Temp 37.1 °C (98.7 °F) (Temporal)   Resp 18   Ht 1.727 m (5' 8\")   Wt 120 kg (264 lb 8.8 oz)   SpO2 97%   BMI 40.22 kg/m²       Constitutional: Well developed, Well nourished, No acute distress, Non-toxic appearance.   HENT: " Normocephalic, Atraumatic, Bilateral external ears normal, Oropharynx moist, No oral exudates, Nose normal.   Eyes: PERRLA, EOMI, Conjunctiva normal, No discharge.   Neck: Normal range of motion, No tenderness, Supple, No stridor.   Cardiovascular: Normal heart rate, Normal rhythm, No murmurs, No rubs, No gallops.   Thorax & Lungs: Normal breath sounds, No respiratory distress, No wheezing, No chest tenderness.   Abdomen: Bowel sounds normal, Soft, No tenderness, No masses, No pulsatile masses.   Skin: Warm, Dry, No erythema, No rash.   Back: No tenderness, No CVA tenderness.   Genitalia: Female technician Trevor in the room during exam.  External genitalia appear to be normal.  There is a small amount of yellowish-gray malodorous discharge noted in the vaginal vault  Extremities: Intact distal pulses, No edema, No tenderness, No cyanosis, No clubbing.   Neurologic: Alert & oriented x 3, Normal motor function, Normal sensory function, No focal deficits noted.   Psychiatric: Anxious      Results for orders placed or performed during the hospital encounter of 03/19/21   URINALYSIS,CULTURE IF INDICATED    Specimen: Urine, Clean Catch   Result Value Ref Range    Color Yellow     Character Clear     Specific Gravity 1.033 <1.035    Ph 5.0 5.0 - 8.0    Glucose Negative Negative mg/dL    Ketones Negative Negative mg/dL    Protein Negative Negative mg/dL    Bilirubin Negative Negative    Urobilinogen, Urine 0.2 Negative    Nitrite Negative Negative    Leukocyte Esterase Negative Negative    Occult Blood Negative Negative    Micro Urine Req see below    CBC WITH DIFFERENTIAL   Result Value Ref Range    WBC 10.0 4.8 - 10.8 K/uL    RBC 4.74 4.20 - 5.40 M/uL    Hemoglobin 12.9 12.0 - 16.0 g/dL    Hematocrit 41.4 37.0 - 47.0 %    MCV 87.3 81.4 - 97.8 fL    MCH 27.2 27.0 - 33.0 pg    MCHC 31.2 (L) 33.6 - 35.0 g/dL    RDW 42.1 35.9 - 50.0 fL    Platelet Count 235 164 - 446 K/uL    MPV 11.4 9.0 - 12.9 fL    Neutrophils-Polys 78.60  (H) 44.00 - 72.00 %    Lymphocytes 13.60 (L) 22.00 - 41.00 %    Monocytes 6.60 0.00 - 13.40 %    Eosinophils 0.50 0.00 - 6.90 %    Basophils 0.30 0.00 - 1.80 %    Immature Granulocytes 0.40 0.00 - 0.90 %    Nucleated RBC 0.00 /100 WBC    Neutrophils (Absolute) 7.88 (H) 2.00 - 7.15 K/uL    Lymphs (Absolute) 1.36 1.00 - 4.80 K/uL    Monos (Absolute) 0.66 0.00 - 0.85 K/uL    Eos (Absolute) 0.05 0.00 - 0.51 K/uL    Baso (Absolute) 0.03 0.00 - 0.12 K/uL    Immature Granulocytes (abs) 0.04 0.00 - 0.11 K/uL    NRBC (Absolute) 0.00 K/uL   Comp Metabolic Panel   Result Value Ref Range    Sodium 135 135 - 145 mmol/L    Potassium 3.7 3.6 - 5.5 mmol/L    Chloride 99 96 - 112 mmol/L    Co2 26 20 - 33 mmol/L    Anion Gap 10.0 7.0 - 16.0    Glucose 193 (H) 65 - 99 mg/dL    Bun 8 8 - 22 mg/dL    Creatinine 0.63 0.50 - 1.40 mg/dL    Calcium 9.4 8.5 - 10.5 mg/dL    AST(SGOT) 15 12 - 45 U/L    ALT(SGPT) 12 2 - 50 U/L    Alkaline Phosphatase 101 (H) 30 - 99 U/L    Total Bilirubin 0.4 0.1 - 1.5 mg/dL    Albumin 4.2 3.2 - 4.9 g/dL    Total Protein 8.0 6.0 - 8.2 g/dL    Globulin 3.8 (H) 1.9 - 3.5 g/dL    A-G Ratio 1.1 g/dL   HCG QUAL SERUM   Result Value Ref Range    Beta-Hcg Qualitative Serum Negative Negative   ESTIMATED GFR   Result Value Ref Range    GFR If African American >60 >60 mL/min/1.73 m 2    GFR If Non African American >60 >60 mL/min/1.73 m 2   Chlamydia/GC PCR Urine Or Swab    Specimen: Urine, First Catch   Result Value Ref Range    Source Other    Vaginal Pathogens DNA Panel   Result Value Ref Range    Candida species DNA Probe Negative Negative    Trichamonas vaginalis DNA Probe Negative Negative    Gardnerella vaginalis DNA Probe POSITIVE (A) Negative       COURSE & MEDICAL DECISION MAKING  Pertinent Labs & Imaging studies reviewed. (See chart for details)  Patient presents here with some lower abdominal pain mild dysuria.  Urinalysis is negative pregnancy is negative.  Pelvic exam demonstrated some discharge without  cervical motion tenderness.  She is positive for Gardnerella vaginalis.  She has been in the same relationship for 4 months.  I have added on gonorrhea and chlamydia to the patient urine.  I will treat her for the known process with Flagyl 500 mg twice daily and advised her to follow-up on her gonorrhea and Chlamydia tests in 48 hours    FINAL IMPRESSION  1.  Bacterial vaginosis      Electronically signed by: Andrzej Ann M.D., 3/19/2021 9:42 AM

## 2021-03-19 NOTE — ED NOTES
Pelvic exam completed by Dr. Ann and Trevor [facility]. Swabs sent to lab. Patient updated on plan of care. Patient provided with food an water. Ok per Dr. Ann.

## 2021-03-19 NOTE — ED NOTES
Pt ambulated back to room. Pt placed back on pulse oximeter. Pt provided with water. Pt states pain has improved from a 9/10 to a 4/10. No other needs at this time. Urine sent to lab.

## 2021-03-19 NOTE — ED NOTES
PIV placed, blood drawn and sent, IVF infusing, medication administered. Pt states that she is currently unable to provide urine sample. She is aware that she is to remain NPO.

## 2021-03-19 NOTE — DISCHARGE PLANNING
CHW met pt at bedside to introduce CCM program and schedule PCP appointment. Patient informed CHW she is established at [facility]. CHW offered to get in contact with [facility] schedulers to call patient. Patient verbally consented to this plan and will be expecting a call from [facility]. CHW also offered patient a Food Rx prescription and bus pass. CHW gave patient MTM information for future appointments and housing resources. CHW also gave patient this worker's contact information if further assistance needed.  Patient greatly appreciated the information and resources.

## 2021-03-21 LAB
C TRACH DNA SPEC QL NAA+PROBE: POSITIVE
N GONORRHOEA DNA SPEC QL NAA+PROBE: POSITIVE
SPECIMEN SOURCE: ABNORMAL

## 2021-03-22 NOTE — ED NOTES
"ED Positive Culture Follow-up/Notification Note:    Date: 3/22/2021     Patient seen in the ED on 3/19/2021 for pelvic pain, burning with urination .   1. Bacterial vaginosis    2. Gardnerella associated vaginal discharge       Discharge Medication List as of 3/19/2021  1:22 PM      START taking these medications    Details   metroNIDAZOLE (FLAGYL) 500 MG Tab Take 1 tablet by mouth 3 times a day for 7 days., Disp-21 tablet, R-0, Print Rx Paper             Allergies: Vicodin [hydrocodone-acetaminophen]     Vitals:    03/19/21 0906 03/19/21 0920 03/19/21 1150 03/19/21 1327   BP: (!) 170/99  (!) 167/89 149/92   Pulse: (!) 104  98 91   Resp: 16  18 20   Temp: 36.7 °C (98 °F)  37.1 °C (98.7 °F) 36.9 °C (98.4 °F)   TempSrc: Temporal  Temporal Temporal   SpO2: 94%  97% 96%   Weight:  120 kg (264 lb 8.8 oz)     Height: 1.727 m (5' 8\")          Final cultures:   Results     Procedure Component Value Units Date/Time    Chlamydia/GC PCR Urine Or Swab [[id number]]  (Abnormal) Collected: 03/19/21 1210    Order Status: Completed Specimen: Urine, First Catch Updated: 03/21/21 1842     C. trachomatis by PCR POSITIVE     N. gonorrhoeae by PCR POSITIVE     Source Other    Narrative:      Release to patient->Immediate    URINALYSIS,CULTURE IF INDICATED [[id number]] Collected: 03/19/21 1140    Order Status: Completed Specimen: Urine, Clean Catch Updated: 03/19/21 1222     Color Yellow     Character Clear     Specific Gravity 1.033     Ph 5.0     Glucose Negative mg/dL      Ketones Negative mg/dL      Protein Negative mg/dL      Bilirubin Negative     Urobilinogen, Urine 0.2     Nitrite Negative     Leukocyte Esterase Negative     Occult Blood Negative     Micro Urine Req see below     Comment: Microscopic examination not performed when specimen is clear  and chemically negative for protein, blood, leukocyte esterase  and nitrite.         Narrative:      Indication for culture:->Patient WITHOUT an indwelling Tate  catheter in place with " new onset of Dysuria, Frequency,  Urgency, and/or Suprapubic pain  Release to patient->Immediate    URINALYSIS [[id number]]     Order Status: Canceled Specimen: Urine           Plan:   Attempted to contact patient to go over results and need for treatment, patient did not  left message. Will send letter going over the following:   Patient not treated for gonorrhea in the ER. Additional treatment necessary.  the patient to abstain from sexual intercourse 7 days after antibiotic therapy is completed, to notify any sexual partners within the last 60 days to go the the Health Department for testing, to seek further HIV/STD testing, and to seek medical attention if symptoms persist.    If patient returns to the ER recommend treating with Doxycyline 100 mg PO BID x 7d and Ceftriaxone 500 mg IM x1. No antibiotic allergies noted.        Gerber Brooks, MarjorieD

## 2021-08-23 ENCOUNTER — HOSPITAL ENCOUNTER (OUTPATIENT)
Facility: MEDICAL CENTER | Age: 41
End: 2021-08-26
Attending: EMERGENCY MEDICINE | Admitting: STUDENT IN AN ORGANIZED HEALTH CARE EDUCATION/TRAINING PROGRAM
Payer: MEDICAID

## 2021-08-23 ENCOUNTER — APPOINTMENT (OUTPATIENT)
Dept: RADIOLOGY | Facility: MEDICAL CENTER | Age: 41
End: 2021-08-23
Attending: STUDENT IN AN ORGANIZED HEALTH CARE EDUCATION/TRAINING PROGRAM
Payer: MEDICAID

## 2021-08-23 ENCOUNTER — APPOINTMENT (OUTPATIENT)
Dept: RADIOLOGY | Facility: MEDICAL CENTER | Age: 41
End: 2021-08-23
Attending: EMERGENCY MEDICINE
Payer: MEDICAID

## 2021-08-23 DIAGNOSIS — A54.9 GONORRHEA: ICD-10-CM

## 2021-08-23 DIAGNOSIS — L03.119 CELLULITIS IN DIABETIC FOOT (HCC): ICD-10-CM

## 2021-08-23 DIAGNOSIS — A56.09 CHLAMYDIA TRACHOMATIS INFECTION OF LOWER GENITOURINARY SITES: ICD-10-CM

## 2021-08-23 DIAGNOSIS — E11.628 CELLULITIS IN DIABETIC FOOT (HCC): ICD-10-CM

## 2021-08-23 DIAGNOSIS — R73.9 HYPERGLYCEMIA: ICD-10-CM

## 2021-08-23 PROBLEM — L03.90 CELLULITIS: Status: ACTIVE | Noted: 2021-08-23

## 2021-08-23 PROBLEM — E11.9 TYPE 2 DIABETES MELLITUS WITHOUT COMPLICATION, WITHOUT LONG-TERM CURRENT USE OF INSULIN (HCC): Status: ACTIVE | Noted: 2021-08-23

## 2021-08-23 PROBLEM — F17.200 SMOKER: Status: ACTIVE | Noted: 2021-08-23

## 2021-08-23 PROBLEM — E11.65 TYPE 2 DIABETES MELLITUS WITH HYPERGLYCEMIA, WITHOUT LONG-TERM CURRENT USE OF INSULIN (HCC): Status: ACTIVE | Noted: 2021-08-23

## 2021-08-23 PROBLEM — R93.89 ABNORMAL X-RAY: Status: ACTIVE | Noted: 2021-08-23

## 2021-08-23 PROBLEM — R00.1 BRADYCARDIA: Status: ACTIVE | Noted: 2021-08-23

## 2021-08-23 PROBLEM — E87.1 HYPONATREMIA: Status: ACTIVE | Noted: 2021-08-23

## 2021-08-23 PROBLEM — A64 SEXUALLY TRANSMITTED DISEASE: Status: ACTIVE | Noted: 2021-08-23

## 2021-08-23 LAB
ALBUMIN SERPL BCP-MCNC: 3.7 G/DL (ref 3.2–4.9)
ALBUMIN/GLOB SERPL: 1 G/DL
ALP SERPL-CCNC: 86 U/L (ref 30–99)
ALT SERPL-CCNC: 14 U/L (ref 2–50)
ANION GAP SERPL CALC-SCNC: 12 MMOL/L (ref 7–16)
AST SERPL-CCNC: 15 U/L (ref 12–45)
BASOPHILS # BLD AUTO: 0.2 % (ref 0–1.8)
BASOPHILS # BLD: 0.01 K/UL (ref 0–0.12)
BILIRUB SERPL-MCNC: 0.2 MG/DL (ref 0.1–1.5)
BUN SERPL-MCNC: 5 MG/DL (ref 8–22)
CALCIUM SERPL-MCNC: 9.5 MG/DL (ref 8.5–10.5)
CHLORIDE SERPL-SCNC: 96 MMOL/L (ref 96–112)
CO2 SERPL-SCNC: 26 MMOL/L (ref 20–33)
CREAT SERPL-MCNC: 0.58 MG/DL (ref 0.5–1.4)
EOSINOPHIL # BLD AUTO: 0.09 K/UL (ref 0–0.51)
EOSINOPHIL NFR BLD: 1.4 % (ref 0–6.9)
ERYTHROCYTE [DISTWIDTH] IN BLOOD BY AUTOMATED COUNT: 42.9 FL (ref 35.9–50)
GLOBULIN SER CALC-MCNC: 3.8 G/DL (ref 1.9–3.5)
GLUCOSE BLD-MCNC: 119 MG/DL (ref 65–99)
GLUCOSE SERPL-MCNC: 142 MG/DL (ref 65–99)
HCT VFR BLD AUTO: 39.3 % (ref 37–47)
HGB BLD-MCNC: 12.5 G/DL (ref 12–16)
IMM GRANULOCYTES # BLD AUTO: 0.02 K/UL (ref 0–0.11)
IMM GRANULOCYTES NFR BLD AUTO: 0.3 % (ref 0–0.9)
LACTATE BLD-SCNC: 1.3 MMOL/L (ref 0.5–2)
LYMPHOCYTES # BLD AUTO: 0.83 K/UL (ref 1–4.8)
LYMPHOCYTES NFR BLD: 12.6 % (ref 22–41)
MCH RBC QN AUTO: 27.4 PG (ref 27–33)
MCHC RBC AUTO-ENTMCNC: 31.8 G/DL (ref 33.6–35)
MCV RBC AUTO: 86 FL (ref 81.4–97.8)
MONOCYTES # BLD AUTO: 0.35 K/UL (ref 0–0.85)
MONOCYTES NFR BLD AUTO: 5.3 % (ref 0–13.4)
NEUTROPHILS # BLD AUTO: 5.27 K/UL (ref 2–7.15)
NEUTROPHILS NFR BLD: 80.2 % (ref 44–72)
NRBC # BLD AUTO: 0 K/UL
NRBC BLD-RTO: 0 /100 WBC
PLATELET # BLD AUTO: 171 K/UL (ref 164–446)
PMV BLD AUTO: 11.2 FL (ref 9–12.9)
POTASSIUM SERPL-SCNC: 3.8 MMOL/L (ref 3.6–5.5)
PROT SERPL-MCNC: 7.5 G/DL (ref 6–8.2)
RBC # BLD AUTO: 4.57 M/UL (ref 4.2–5.4)
SODIUM SERPL-SCNC: 134 MMOL/L (ref 135–145)
TREPONEMA PALLIDUM IGG+IGM AB [PRESENCE] IN SERUM OR PLASMA BY IMMUNOASSAY: NORMAL
WBC # BLD AUTO: 6.6 K/UL (ref 4.8–10.8)

## 2021-08-23 PROCEDURE — 700117 HCHG RX CONTRAST REV CODE 255: Performed by: STUDENT IN AN ORGANIZED HEALTH CARE EDUCATION/TRAINING PROGRAM

## 2021-08-23 PROCEDURE — 96375 TX/PRO/DX INJ NEW DRUG ADDON: CPT

## 2021-08-23 PROCEDURE — 83605 ASSAY OF LACTIC ACID: CPT

## 2021-08-23 PROCEDURE — A9270 NON-COVERED ITEM OR SERVICE: HCPCS | Performed by: EMERGENCY MEDICINE

## 2021-08-23 PROCEDURE — 86780 TREPONEMA PALLIDUM: CPT

## 2021-08-23 PROCEDURE — 700102 HCHG RX REV CODE 250 W/ 637 OVERRIDE(OP): Performed by: EMERGENCY MEDICINE

## 2021-08-23 PROCEDURE — 700111 HCHG RX REV CODE 636 W/ 250 OVERRIDE (IP): Performed by: EMERGENCY MEDICINE

## 2021-08-23 PROCEDURE — 82962 GLUCOSE BLOOD TEST: CPT

## 2021-08-23 PROCEDURE — 73701 CT LOWER EXTREMITY W/DYE: CPT | Mod: LT

## 2021-08-23 PROCEDURE — 99220 PR INITIAL OBSERVATION CARE,LEVL III: CPT | Mod: 25 | Performed by: STUDENT IN AN ORGANIZED HEALTH CARE EDUCATION/TRAINING PROGRAM

## 2021-08-23 PROCEDURE — 85025 COMPLETE CBC W/AUTO DIFF WBC: CPT

## 2021-08-23 PROCEDURE — 96374 THER/PROPH/DIAG INJ IV PUSH: CPT

## 2021-08-23 PROCEDURE — G0378 HOSPITAL OBSERVATION PER HR: HCPCS

## 2021-08-23 PROCEDURE — 83036 HEMOGLOBIN GLYCOSYLATED A1C: CPT

## 2021-08-23 PROCEDURE — 700105 HCHG RX REV CODE 258: Performed by: STUDENT IN AN ORGANIZED HEALTH CARE EDUCATION/TRAINING PROGRAM

## 2021-08-23 PROCEDURE — 73630 X-RAY EXAM OF FOOT: CPT | Mod: LT

## 2021-08-23 PROCEDURE — 84703 CHORIONIC GONADOTROPIN ASSAY: CPT

## 2021-08-23 PROCEDURE — 99285 EMERGENCY DEPT VISIT HI MDM: CPT

## 2021-08-23 PROCEDURE — 99406 BEHAV CHNG SMOKING 3-10 MIN: CPT | Performed by: STUDENT IN AN ORGANIZED HEALTH CARE EDUCATION/TRAINING PROGRAM

## 2021-08-23 PROCEDURE — 80053 COMPREHEN METABOLIC PANEL: CPT

## 2021-08-23 PROCEDURE — 87040 BLOOD CULTURE FOR BACTERIA: CPT

## 2021-08-23 PROCEDURE — 700102 HCHG RX REV CODE 250 W/ 637 OVERRIDE(OP): Performed by: STUDENT IN AN ORGANIZED HEALTH CARE EDUCATION/TRAINING PROGRAM

## 2021-08-23 RX ORDER — MORPHINE SULFATE 4 MG/ML
4 INJECTION, SOLUTION INTRAMUSCULAR; INTRAVENOUS ONCE
Status: COMPLETED | OUTPATIENT
Start: 2021-08-23 | End: 2021-08-23

## 2021-08-23 RX ORDER — ONDANSETRON 2 MG/ML
4 INJECTION INTRAMUSCULAR; INTRAVENOUS ONCE
Status: COMPLETED | OUTPATIENT
Start: 2021-08-23 | End: 2021-08-23

## 2021-08-23 RX ORDER — BISACODYL 10 MG
10 SUPPOSITORY, RECTAL RECTAL
Status: DISCONTINUED | OUTPATIENT
Start: 2021-08-23 | End: 2021-08-26 | Stop reason: HOSPADM

## 2021-08-23 RX ORDER — PROMETHAZINE HYDROCHLORIDE 25 MG/1
12.5-25 TABLET ORAL EVERY 4 HOURS PRN
Status: DISCONTINUED | OUTPATIENT
Start: 2021-08-23 | End: 2021-08-26 | Stop reason: HOSPADM

## 2021-08-23 RX ORDER — PROMETHAZINE HYDROCHLORIDE 12.5 MG/1
12.5-25 SUPPOSITORY RECTAL EVERY 4 HOURS PRN
Status: DISCONTINUED | OUTPATIENT
Start: 2021-08-23 | End: 2021-08-26 | Stop reason: HOSPADM

## 2021-08-23 RX ORDER — ONDANSETRON 4 MG/1
4 TABLET, ORALLY DISINTEGRATING ORAL EVERY 4 HOURS PRN
Status: DISCONTINUED | OUTPATIENT
Start: 2021-08-23 | End: 2021-08-26 | Stop reason: HOSPADM

## 2021-08-23 RX ORDER — ONDANSETRON 2 MG/ML
4 INJECTION INTRAMUSCULAR; INTRAVENOUS EVERY 4 HOURS PRN
Status: DISCONTINUED | OUTPATIENT
Start: 2021-08-23 | End: 2021-08-26 | Stop reason: HOSPADM

## 2021-08-23 RX ORDER — SODIUM CHLORIDE 9 MG/ML
INJECTION, SOLUTION INTRAVENOUS CONTINUOUS
Status: DISCONTINUED | OUTPATIENT
Start: 2021-08-23 | End: 2021-08-26 | Stop reason: HOSPADM

## 2021-08-23 RX ORDER — NICOTINE 21 MG/24HR
14 PATCH, TRANSDERMAL 24 HOURS TRANSDERMAL
Status: DISCONTINUED | OUTPATIENT
Start: 2021-08-24 | End: 2021-08-26 | Stop reason: HOSPADM

## 2021-08-23 RX ORDER — AMOXICILLIN 250 MG
2 CAPSULE ORAL 2 TIMES DAILY
Status: DISCONTINUED | OUTPATIENT
Start: 2021-08-23 | End: 2021-08-26 | Stop reason: HOSPADM

## 2021-08-23 RX ORDER — LABETALOL HYDROCHLORIDE 5 MG/ML
10 INJECTION, SOLUTION INTRAVENOUS EVERY 4 HOURS PRN
Status: DISCONTINUED | OUTPATIENT
Start: 2021-08-23 | End: 2021-08-26 | Stop reason: HOSPADM

## 2021-08-23 RX ORDER — POLYETHYLENE GLYCOL 3350 17 G/17G
1 POWDER, FOR SOLUTION ORAL
Status: DISCONTINUED | OUTPATIENT
Start: 2021-08-23 | End: 2021-08-26 | Stop reason: HOSPADM

## 2021-08-23 RX ORDER — INSULIN LISPRO 100 [IU]/ML
2-9 INJECTION, SOLUTION INTRAVENOUS; SUBCUTANEOUS
Status: DISCONTINUED | OUTPATIENT
Start: 2021-08-23 | End: 2021-08-26 | Stop reason: HOSPADM

## 2021-08-23 RX ORDER — PROCHLORPERAZINE EDISYLATE 5 MG/ML
5-10 INJECTION INTRAMUSCULAR; INTRAVENOUS EVERY 4 HOURS PRN
Status: DISCONTINUED | OUTPATIENT
Start: 2021-08-23 | End: 2021-08-26 | Stop reason: HOSPADM

## 2021-08-23 RX ORDER — DOXYCYCLINE 100 MG/1
100 TABLET ORAL EVERY 12 HOURS
Status: DISCONTINUED | OUTPATIENT
Start: 2021-08-23 | End: 2021-08-23

## 2021-08-23 RX ORDER — CEFTRIAXONE 2 G/1
2 INJECTION, POWDER, FOR SOLUTION INTRAMUSCULAR; INTRAVENOUS ONCE
Status: COMPLETED | OUTPATIENT
Start: 2021-08-23 | End: 2021-08-23

## 2021-08-23 RX ORDER — DEXTROSE MONOHYDRATE 25 G/50ML
50 INJECTION, SOLUTION INTRAVENOUS
Status: DISCONTINUED | OUTPATIENT
Start: 2021-08-23 | End: 2021-08-26 | Stop reason: HOSPADM

## 2021-08-23 RX ORDER — ENALAPRILAT 1.25 MG/ML
1.25 INJECTION INTRAVENOUS EVERY 6 HOURS PRN
Status: DISCONTINUED | OUTPATIENT
Start: 2021-08-23 | End: 2021-08-26 | Stop reason: HOSPADM

## 2021-08-23 RX ORDER — MORPHINE SULFATE 4 MG/ML
3 INJECTION, SOLUTION INTRAMUSCULAR; INTRAVENOUS
Status: DISCONTINUED | OUTPATIENT
Start: 2021-08-23 | End: 2021-08-26 | Stop reason: HOSPADM

## 2021-08-23 RX ORDER — CLONIDINE HYDROCHLORIDE 0.1 MG/1
0.1 TABLET ORAL EVERY 6 HOURS PRN
Status: DISCONTINUED | OUTPATIENT
Start: 2021-08-23 | End: 2021-08-26 | Stop reason: HOSPADM

## 2021-08-23 RX ADMIN — DOXYCYCLINE 100 MG: 100 TABLET, FILM COATED ORAL at 17:50

## 2021-08-23 RX ADMIN — SODIUM CHLORIDE: 9 INJECTION, SOLUTION INTRAVENOUS at 21:53

## 2021-08-23 RX ADMIN — CEFTRIAXONE SODIUM 2 G: 2 INJECTION, POWDER, FOR SOLUTION INTRAMUSCULAR; INTRAVENOUS at 17:49

## 2021-08-23 RX ADMIN — MORPHINE SULFATE 4 MG: 4 INJECTION INTRAVENOUS at 17:50

## 2021-08-23 RX ADMIN — IOHEXOL 100 ML: 350 INJECTION, SOLUTION INTRAVENOUS at 21:10

## 2021-08-23 RX ADMIN — ONDANSETRON 4 MG: 2 INJECTION INTRAMUSCULAR; INTRAVENOUS at 17:50

## 2021-08-23 ASSESSMENT — FIBROSIS 4 INDEX
FIB4 SCORE: 0.74
FIB4 SCORE: 0.94

## 2021-08-23 ASSESSMENT — LIFESTYLE VARIABLES
AVERAGE NUMBER OF DAYS PER WEEK YOU HAVE A DRINK CONTAINING ALCOHOL: 0
TOTAL SCORE: 0
ON A TYPICAL DAY WHEN YOU DRINK ALCOHOL HOW MANY DRINKS DO YOU HAVE: 0
TOTAL SCORE: 0
HAVE PEOPLE ANNOYED YOU BY CRITICIZING YOUR DRINKING: NO
EVER HAD A DRINK FIRST THING IN THE MORNING TO STEADY YOUR NERVES TO GET RID OF A HANGOVER: NO
TOTAL SCORE: 0
CONSUMPTION TOTAL: NEGATIVE
HOW MANY TIMES IN THE PAST YEAR HAVE YOU HAD 5 OR MORE DRINKS IN A DAY: 0
EVER FELT BAD OR GUILTY ABOUT YOUR DRINKING: NO
ALCOHOL_USE: NO
DOES PATIENT WANT TO STOP DRINKING: NO
HAVE YOU EVER FELT YOU SHOULD CUT DOWN ON YOUR DRINKING: NO

## 2021-08-23 ASSESSMENT — ENCOUNTER SYMPTOMS
EYE PAIN: 0
VOMITING: 0
SORE THROAT: 0
MYALGIAS: 0
NAUSEA: 0
COUGH: 0
WEAKNESS: 0
LOSS OF CONSCIOUSNESS: 0
BLOOD IN STOOL: 0
CHILLS: 0
SHORTNESS OF BREATH: 0
WEIGHT LOSS: 0
BLURRED VISION: 0
CONSTIPATION: 0
PALPITATIONS: 0
FEVER: 1
ABDOMINAL PAIN: 0
WHEEZING: 0
ORTHOPNEA: 0
DIZZINESS: 0
HEMOPTYSIS: 0
DIARRHEA: 0

## 2021-08-23 ASSESSMENT — PATIENT HEALTH QUESTIONNAIRE - PHQ9
2. FEELING DOWN, DEPRESSED, IRRITABLE, OR HOPELESS: NOT AT ALL
SUM OF ALL RESPONSES TO PHQ9 QUESTIONS 1 AND 2: 0
1. LITTLE INTEREST OR PLEASURE IN DOING THINGS: NOT AT ALL

## 2021-08-23 ASSESSMENT — PAIN DESCRIPTION - PAIN TYPE: TYPE: ACUTE PAIN

## 2021-08-23 NOTE — ED PROVIDER NOTES
ED Provider Note    Scribed for Catrina Cotto M.D. by Maryann Damian. 8/23/2021, 3:43 PM.    Primary care provider: Pcp Pt States None  Means of arrival: Walk in   History obtained from: Patient   History limited by: None    CHIEF COMPLAINT  Chief Complaint   Patient presents with   • Foot Swelling     Pt left ankle redness and swelling for 2 days.  3+ edema on Left medial ankle.        HPI  Jc Ramos is a 40 y.o. female who presents to the Emergency Department for redness and swelling to the left medial ankle onset 3 days ago. The patient states that she has diabetes and woke up with these symptoms. She reports an associated wound to the left medial ankle, left ankle pain, and fever, which she states occurred on Friday. Patient states that she is unable to walk secondary to her pain. She denies vomiting or current drainage.  Denies any recent trauma to the ankle.    The patient also reports that she went to Urgent Care and tested positive for chlamydia and gonorrhea, but she has not received antibiotics.     REVIEW OF SYSTEMS  Pertinent positives include redness and swelling to the left medial ankle, wound to the left medial ankle, left ankle pain, and fever. Pertinent negatives include no vomiting or current drainage. All other systems reviewed and negative.     PAST MEDICAL HISTORY   has a past medical history of Diabetes.    SURGICAL HISTORY   has a past surgical history that includes cholecystectomy and c-sec only,prev c-sec.    SOCIAL HISTORY  Social History     Tobacco Use   • Smoking status: Current Every Day Smoker     Packs/day: 0.50     Types: Cigarettes   • Smokeless tobacco: Never Used   • Tobacco comment: 1/2 ppd since age 16   Vaping Use   • Vaping Use: Never used   Substance Use Topics   • Alcohol use: No   • Drug use: No      Social History     Substance and Sexual Activity   Drug Use No       FAMILY HISTORY  History reviewed. No pertinent family history.    CURRENT MEDICATIONS  Home  "Medications     Reviewed by Luz Dubois R.N. (Registered Nurse) on 08/23/21 at 1408  Med List Status: Partial   Medication Last Dose Status   albuterol 108 (90 Base) MCG/ACT Aero Soln inhalation aerosol  Active   benzonatate (TESSALON) 100 MG Cap  Active   busPIRone (BUSPAR) 10 MG Tab  Active   busPIRone (BUSPAR) 5 MG Tab  Active   cyclobenzaprine (FLEXERIL) 10 MG Tab  Active   lorazepam (ATIVAN) 1 MG Tab  Active   meclizine (ANTIVERT) 25 MG Tab  Active   metformin (GLUCOPHAGE) 500 MG Tab  Active   metformin (GLUCOPHAGE) 500 MG TABS  Active   paroxetine (PAXIL) 20 MG Tab  Active   paroxetine (PAXIL) 20 MG Tab  Active   Pseudoephedrine-Naproxen Na (ALEVE-D SINUS & COLD) 120-220 MG TABLET SR 12 HR  Active   trazodone (DESYREL) 50 MG Tab  Active   trazodone (DESYREL) 50 MG TABS  Active                ALLERGIES  Allergies   Allergen Reactions   • Vicodin [Hydrocodone-Acetaminophen] Vomiting       PHYSICAL EXAM  VITAL SIGNS: /91   Pulse (!) 115   Temp 35.8 °C (96.5 °F) (Temporal)   Resp 16   Ht 1.727 m (5' 8\")   Wt 113 kg (250 lb)   LMP 08/02/2021   SpO2 99%   BMI 38.01 kg/m²     Constitutional: Well developed, mild acute distress, Non-toxic appearance.   HENT: Normocephalic, Atraumatic, Bilateral external ears normal, Nose normal.   Eyes: PERRL, EOMI, Conjunctiva normal.    Neck: Normal range of motion, No tenderness, Supple.     Cardiovascular: tachycardic heart rate, Normal rhythm.    Thorax & Lungs: Normal breath sounds, No respiratory distress.    Abdomen: Benign abdominal exam, no tenderness, no distention, no guarding, no rebound.   Skin: Warm, Dry, To the medial aspect of left ankle there is a wound without current drainage, minimal fluctuance, diffuse redness across top of foot and up the calf, positive warmth   Back: No tenderness, No CVA tenderness.   Extremities: Intact distal pulses, No edema, No tenderness   Neurologic: Alert & oriented x 3, Normal motor function, Normal sensory function, No " focal deficits noted.   Psychiatric: Appropriate                               DIAGNOSTIC STUDIES / PROCEDURES\    LABS  Results for orders placed or performed during the hospital encounter of 08/23/21   CBC WITH DIFFERENTIAL   Result Value Ref Range    WBC 6.6 4.8 - 10.8 K/uL    RBC 4.57 4.20 - 5.40 M/uL    Hemoglobin 12.5 12.0 - 16.0 g/dL    Hematocrit 39.3 37.0 - 47.0 %    MCV 86.0 81.4 - 97.8 fL    MCH 27.4 27.0 - 33.0 pg    MCHC 31.8 (L) 33.6 - 35.0 g/dL    RDW 42.9 35.9 - 50.0 fL    Platelet Count 171 164 - 446 K/uL    MPV 11.2 9.0 - 12.9 fL    Neutrophils-Polys 80.20 (H) 44.00 - 72.00 %    Lymphocytes 12.60 (L) 22.00 - 41.00 %    Monocytes 5.30 0.00 - 13.40 %    Eosinophils 1.40 0.00 - 6.90 %    Basophils 0.20 0.00 - 1.80 %    Immature Granulocytes 0.30 0.00 - 0.90 %    Nucleated RBC 0.00 /100 WBC    Neutrophils (Absolute) 5.27 2.00 - 7.15 K/uL    Lymphs (Absolute) 0.83 (L) 1.00 - 4.80 K/uL    Monos (Absolute) 0.35 0.00 - 0.85 K/uL    Eos (Absolute) 0.09 0.00 - 0.51 K/uL    Baso (Absolute) 0.01 0.00 - 0.12 K/uL    Immature Granulocytes (abs) 0.02 0.00 - 0.11 K/uL    NRBC (Absolute) 0.00 K/uL   LACTIC ACID   Result Value Ref Range    Lactic Acid 1.3 0.5 - 2.0 mmol/L      All labs reviewed by me.      RADIOLOGY  DX-FOOT-COMPLETE 3+ LEFT   Final Result         1.  No radiographic evidence of acute injury.      2. Soft tissue swelling and subcutaneous air noted in medial aspect of left ankle suspicious for subcutaneous infection or abscess.        The radiologist's interpretation of all radiological studies have been reviewed by me.    COURSE & MEDICAL DECISION MAKING  Nursing notes, VS, PMSFHx reviewed in chart.     Patient presents with evidence of a diabetic wound/cellulitis to the ankle.  Patient is tachycardic and sepsis protocol was activated.  Do not suspect necrotizing fasciitis at this time.  The wound itself feels quite indurated but I do not feel any fluctuance although I cannot totally exclude  abscess.  I do not suspect joint infection at this time.  I spoke with pharmacy concerning antibiotic coverage.  In light of the fact that the patient has chlamydia and gonorrhea as well, will give the patient 2 g of Rocephin and doxycycline.  We decided to hold the vancomycin due to covering with Rocephin.    3:43 PM Patient seen and examined at bedside. The patient presents with a wound and swelling to her left medial ankle and the differential diagnosis includes but is not limited to Abscess vs cellulitis vs ulcer, rule out sepsis. Ordered for DX-Foot, Lactic Acid, CBC with diff, CMP, and Blood Culture to evaluate. Patient was treated with Unasyn 3 g, morphine 4 mg, and Zofran 4 mg for her symptoms.    Patient's labs show normal white count without evidence of bandemia.  Lactic acid is normal, the x-ray shows soft tissue swelling and some subcu air but suspicious for infection/abscess.  Patient will be hospitalized for further management of her cellulitis.  Chemistry panel shows a glucose of 142 but no evidence of a gap acidosis.  Sodium is 134.    Discussed the case with hospitalist who will see the patient.    DISPOSITION:  Patient will be hospitalized by Dr. Sommers in guarded condition.     FINAL IMPRESSION  1. Cellulitis in diabetic foot (HCC)    2. Hyperglycemia    3. Gonorrhea    4. Chlamydia trachomatis infection of lower genitourinary sites          IMaryann), am scribing for, and in the presence of, Catrina Cotto M.D..    Electronically signed by: Maryann Cuevas), 8/23/2021    ICatrina M.D. personally performed the services described in this documentation, as scribed by Maryann Damian in my presence, and it is both accurate and complete. C    The note accurately reflects work and decisions made by me.  Catrina Cotto M.D.  8/23/2021  7:31 PM
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
## 2021-08-24 ENCOUNTER — ANESTHESIA EVENT (OUTPATIENT)
Dept: SURGERY | Facility: MEDICAL CENTER | Age: 41
End: 2021-08-24
Payer: MEDICAID

## 2021-08-24 ENCOUNTER — ANESTHESIA (OUTPATIENT)
Dept: SURGERY | Facility: MEDICAL CENTER | Age: 41
End: 2021-08-24
Payer: MEDICAID

## 2021-08-24 PROBLEM — D53.9 MACROCYTIC ANEMIA: Status: ACTIVE | Noted: 2021-08-24

## 2021-08-24 PROBLEM — A41.9 SEPSIS (HCC): Status: ACTIVE | Noted: 2021-08-24

## 2021-08-24 PROBLEM — N73.9 PELVIC INFLAMMATORY DISEASE (PID): Status: ACTIVE | Noted: 2021-08-24

## 2021-08-24 LAB
ANION GAP SERPL CALC-SCNC: 8 MMOL/L (ref 7–16)
BASOPHILS # BLD AUTO: 0.3 % (ref 0–1.8)
BASOPHILS # BLD: 0.01 K/UL (ref 0–0.12)
BUN SERPL-MCNC: 6 MG/DL (ref 8–22)
CALCIUM SERPL-MCNC: 8.4 MG/DL (ref 8.5–10.5)
CHLORIDE SERPL-SCNC: 98 MMOL/L (ref 96–112)
CO2 SERPL-SCNC: 26 MMOL/L (ref 20–33)
CREAT SERPL-MCNC: 0.53 MG/DL (ref 0.5–1.4)
EOSINOPHIL # BLD AUTO: 0.08 K/UL (ref 0–0.51)
EOSINOPHIL NFR BLD: 2.2 % (ref 0–6.9)
ERYTHROCYTE [DISTWIDTH] IN BLOOD BY AUTOMATED COUNT: 43.3 FL (ref 35.9–50)
EST. AVERAGE GLUCOSE BLD GHB EST-MCNC: 180 MG/DL
GLUCOSE BLD-MCNC: 113 MG/DL (ref 65–99)
GLUCOSE BLD-MCNC: 120 MG/DL (ref 65–99)
GLUCOSE BLD-MCNC: 162 MG/DL (ref 65–99)
GLUCOSE BLD-MCNC: 223 MG/DL (ref 65–99)
GLUCOSE SERPL-MCNC: 122 MG/DL (ref 65–99)
HBA1C MFR BLD: 7.9 % (ref 4–5.6)
HCG SERPL QL: NEGATIVE
HCT VFR BLD AUTO: 35.7 % (ref 37–47)
HGB BLD-MCNC: 11.3 G/DL (ref 12–16)
IMM GRANULOCYTES # BLD AUTO: 0.01 K/UL (ref 0–0.11)
IMM GRANULOCYTES NFR BLD AUTO: 0.3 % (ref 0–0.9)
INR PPP: 1.02 (ref 0.87–1.13)
LYMPHOCYTES # BLD AUTO: 0.89 K/UL (ref 1–4.8)
LYMPHOCYTES NFR BLD: 24.2 % (ref 22–41)
MAGNESIUM SERPL-MCNC: 1 MG/DL (ref 1.5–2.5)
MCH RBC QN AUTO: 27.4 PG (ref 27–33)
MCHC RBC AUTO-ENTMCNC: 31.7 G/DL (ref 33.6–35)
MCV RBC AUTO: 86.4 FL (ref 81.4–97.8)
MONOCYTES # BLD AUTO: 0.28 K/UL (ref 0–0.85)
MONOCYTES NFR BLD AUTO: 7.6 % (ref 0–13.4)
MRSA DNA SPEC QL NAA+PROBE: NORMAL
NEUTROPHILS # BLD AUTO: 2.41 K/UL (ref 2–7.15)
NEUTROPHILS NFR BLD: 65.4 % (ref 44–72)
NRBC # BLD AUTO: 0 K/UL
NRBC BLD-RTO: 0 /100 WBC
PLATELET # BLD AUTO: 146 K/UL (ref 164–446)
PMV BLD AUTO: 11.7 FL (ref 9–12.9)
POTASSIUM SERPL-SCNC: 3.6 MMOL/L (ref 3.6–5.5)
PROTHROMBIN TIME: 13.1 SEC (ref 12–14.6)
RBC # BLD AUTO: 4.13 M/UL (ref 4.2–5.4)
SARS-COV+SARS-COV-2 AG RESP QL IA.RAPID: NOTDETECTED
SIGNIFICANT IND 70042: NORMAL
SITE SITE: NORMAL
SODIUM SERPL-SCNC: 132 MMOL/L (ref 135–145)
SOURCE SOURCE: NORMAL
SPECIMEN SOURCE: NORMAL
WBC # BLD AUTO: 3.7 K/UL (ref 4.8–10.8)

## 2021-08-24 PROCEDURE — 160027 HCHG SURGERY MINUTES - 1ST 30 MINS LEVEL 2: Performed by: ORTHOPAEDIC SURGERY

## 2021-08-24 PROCEDURE — G0378 HOSPITAL OBSERVATION PER HR: HCPCS

## 2021-08-24 PROCEDURE — 87070 CULTURE OTHR SPECIMN AEROBIC: CPT | Mod: 91

## 2021-08-24 PROCEDURE — 85610 PROTHROMBIN TIME: CPT

## 2021-08-24 PROCEDURE — 160002 HCHG RECOVERY MINUTES (STAT): Performed by: ORTHOPAEDIC SURGERY

## 2021-08-24 PROCEDURE — 160035 HCHG PACU - 1ST 60 MINS PHASE I: Performed by: ORTHOPAEDIC SURGERY

## 2021-08-24 PROCEDURE — A9270 NON-COVERED ITEM OR SERVICE: HCPCS | Performed by: NURSE PRACTITIONER

## 2021-08-24 PROCEDURE — 700111 HCHG RX REV CODE 636 W/ 250 OVERRIDE (IP): Performed by: ANESTHESIOLOGY

## 2021-08-24 PROCEDURE — 700102 HCHG RX REV CODE 250 W/ 637 OVERRIDE(OP): Performed by: ANESTHESIOLOGY

## 2021-08-24 PROCEDURE — 87040 BLOOD CULTURE FOR BACTERIA: CPT

## 2021-08-24 PROCEDURE — 82962 GLUCOSE BLOOD TEST: CPT | Mod: 91

## 2021-08-24 PROCEDURE — 700111 HCHG RX REV CODE 636 W/ 250 OVERRIDE (IP): Performed by: NURSE PRACTITIONER

## 2021-08-24 PROCEDURE — 96365 THER/PROPH/DIAG IV INF INIT: CPT

## 2021-08-24 PROCEDURE — 96376 TX/PRO/DX INJ SAME DRUG ADON: CPT

## 2021-08-24 PROCEDURE — 700101 HCHG RX REV CODE 250: Performed by: STUDENT IN AN ORGANIZED HEALTH CARE EDUCATION/TRAINING PROGRAM

## 2021-08-24 PROCEDURE — 700105 HCHG RX REV CODE 258: Performed by: STUDENT IN AN ORGANIZED HEALTH CARE EDUCATION/TRAINING PROGRAM

## 2021-08-24 PROCEDURE — 700111 HCHG RX REV CODE 636 W/ 250 OVERRIDE (IP): Performed by: STUDENT IN AN ORGANIZED HEALTH CARE EDUCATION/TRAINING PROGRAM

## 2021-08-24 PROCEDURE — 85025 COMPLETE CBC W/AUTO DIFF WBC: CPT

## 2021-08-24 PROCEDURE — 96372 THER/PROPH/DIAG INJ SC/IM: CPT

## 2021-08-24 PROCEDURE — 87426 SARSCOV CORONAVIRUS AG IA: CPT

## 2021-08-24 PROCEDURE — 700105 HCHG RX REV CODE 258: Performed by: NURSE PRACTITIONER

## 2021-08-24 PROCEDURE — 83735 ASSAY OF MAGNESIUM: CPT

## 2021-08-24 PROCEDURE — 87641 MR-STAPH DNA AMP PROBE: CPT

## 2021-08-24 PROCEDURE — 27603 DRAIN LOWER LEG LESION: CPT | Mod: LT | Performed by: ORTHOPAEDIC SURGERY

## 2021-08-24 PROCEDURE — 700101 HCHG RX REV CODE 250: Performed by: ORTHOPAEDIC SURGERY

## 2021-08-24 PROCEDURE — 96366 THER/PROPH/DIAG IV INF ADDON: CPT

## 2021-08-24 PROCEDURE — A9270 NON-COVERED ITEM OR SERVICE: HCPCS | Performed by: STUDENT IN AN ORGANIZED HEALTH CARE EDUCATION/TRAINING PROGRAM

## 2021-08-24 PROCEDURE — 87075 CULTR BACTERIA EXCEPT BLOOD: CPT

## 2021-08-24 PROCEDURE — 80048 BASIC METABOLIC PNL TOTAL CA: CPT

## 2021-08-24 PROCEDURE — 160048 HCHG OR STATISTICAL LEVEL 1-5: Performed by: ORTHOPAEDIC SURGERY

## 2021-08-24 PROCEDURE — A6407 PACKING STRIPS, NON-IMPREG: HCPCS | Performed by: ORTHOPAEDIC SURGERY

## 2021-08-24 PROCEDURE — 99225 PR SUBSEQUENT OBSERVATION CARE,LEVEL II: CPT | Performed by: NURSE PRACTITIONER

## 2021-08-24 PROCEDURE — A9270 NON-COVERED ITEM OR SERVICE: HCPCS | Performed by: ANESTHESIOLOGY

## 2021-08-24 PROCEDURE — 160038 HCHG SURGERY MINUTES - EA ADDL 1 MIN LEVEL 2: Performed by: ORTHOPAEDIC SURGERY

## 2021-08-24 PROCEDURE — 501838 HCHG SUTURE GENERAL: Performed by: ORTHOPAEDIC SURGERY

## 2021-08-24 PROCEDURE — 87205 SMEAR GRAM STAIN: CPT

## 2021-08-24 PROCEDURE — 96367 TX/PROPH/DG ADDL SEQ IV INF: CPT

## 2021-08-24 PROCEDURE — 700102 HCHG RX REV CODE 250 W/ 637 OVERRIDE(OP): Performed by: STUDENT IN AN ORGANIZED HEALTH CARE EDUCATION/TRAINING PROGRAM

## 2021-08-24 PROCEDURE — 500881 HCHG PACK, EXTREMITY: Performed by: ORTHOPAEDIC SURGERY

## 2021-08-24 PROCEDURE — 160009 HCHG ANES TIME/MIN: Performed by: ORTHOPAEDIC SURGERY

## 2021-08-24 PROCEDURE — 700105 HCHG RX REV CODE 258: Performed by: ANESTHESIOLOGY

## 2021-08-24 PROCEDURE — 160036 HCHG PACU - EA ADDL 30 MINS PHASE I: Performed by: ORTHOPAEDIC SURGERY

## 2021-08-24 PROCEDURE — 700102 HCHG RX REV CODE 250 W/ 637 OVERRIDE(OP): Performed by: NURSE PRACTITIONER

## 2021-08-24 PROCEDURE — 99222 1ST HOSP IP/OBS MODERATE 55: CPT | Mod: 57 | Performed by: ORTHOPAEDIC SURGERY

## 2021-08-24 RX ORDER — SODIUM CHLORIDE, SODIUM LACTATE, POTASSIUM CHLORIDE, CALCIUM CHLORIDE 600; 310; 30; 20 MG/100ML; MG/100ML; MG/100ML; MG/100ML
INJECTION, SOLUTION INTRAVENOUS
Status: DISCONTINUED | OUTPATIENT
Start: 2021-08-24 | End: 2021-08-24 | Stop reason: SURG

## 2021-08-24 RX ORDER — MAGNESIUM HYDROXIDE 1200 MG/15ML
LIQUID ORAL
Status: COMPLETED | OUTPATIENT
Start: 2021-08-24 | End: 2021-08-24

## 2021-08-24 RX ORDER — MIDAZOLAM HYDROCHLORIDE 1 MG/ML
INJECTION INTRAMUSCULAR; INTRAVENOUS PRN
Status: DISCONTINUED | OUTPATIENT
Start: 2021-08-24 | End: 2021-08-24 | Stop reason: SURG

## 2021-08-24 RX ORDER — HALOPERIDOL 5 MG/ML
1 INJECTION INTRAMUSCULAR
Status: DISCONTINUED | OUTPATIENT
Start: 2021-08-24 | End: 2021-08-24 | Stop reason: HOSPADM

## 2021-08-24 RX ORDER — PROPOFOL 10 MG/ML
INJECTION, EMULSION INTRAVENOUS PRN
Status: DISCONTINUED | OUTPATIENT
Start: 2021-08-24 | End: 2021-08-24 | Stop reason: SURG

## 2021-08-24 RX ORDER — CELECOXIB 200 MG/1
400 CAPSULE ORAL ONCE
Status: COMPLETED | OUTPATIENT
Start: 2021-08-24 | End: 2021-08-24

## 2021-08-24 RX ORDER — CEFTRIAXONE 2 G/1
2 INJECTION, POWDER, FOR SOLUTION INTRAMUSCULAR; INTRAVENOUS
Status: DISPENSED | OUTPATIENT
Start: 2021-08-24 | End: 2021-08-24

## 2021-08-24 RX ORDER — SODIUM CHLORIDE, SODIUM LACTATE, POTASSIUM CHLORIDE, CALCIUM CHLORIDE 600; 310; 30; 20 MG/100ML; MG/100ML; MG/100ML; MG/100ML
INJECTION, SOLUTION INTRAVENOUS CONTINUOUS
Status: DISCONTINUED | OUTPATIENT
Start: 2021-08-24 | End: 2021-08-24 | Stop reason: HOSPADM

## 2021-08-24 RX ORDER — DEXAMETHASONE SODIUM PHOSPHATE 4 MG/ML
INJECTION, SOLUTION INTRA-ARTICULAR; INTRALESIONAL; INTRAMUSCULAR; INTRAVENOUS; SOFT TISSUE PRN
Status: DISCONTINUED | OUTPATIENT
Start: 2021-08-24 | End: 2021-08-24 | Stop reason: SURG

## 2021-08-24 RX ORDER — CEFTRIAXONE 1 G/1
INJECTION, POWDER, FOR SOLUTION INTRAMUSCULAR; INTRAVENOUS PRN
Status: DISCONTINUED | OUTPATIENT
Start: 2021-08-24 | End: 2021-08-24 | Stop reason: SURG

## 2021-08-24 RX ORDER — SCOLOPAMINE TRANSDERMAL SYSTEM 1 MG/1
1 PATCH, EXTENDED RELEASE TRANSDERMAL
Status: DISCONTINUED | OUTPATIENT
Start: 2021-08-24 | End: 2021-08-24 | Stop reason: HOSPADM

## 2021-08-24 RX ORDER — OXYCODONE HCL 5 MG/5 ML
10 SOLUTION, ORAL ORAL
Status: COMPLETED | OUTPATIENT
Start: 2021-08-24 | End: 2021-08-24

## 2021-08-24 RX ORDER — ONDANSETRON 2 MG/ML
INJECTION INTRAMUSCULAR; INTRAVENOUS PRN
Status: DISCONTINUED | OUTPATIENT
Start: 2021-08-24 | End: 2021-08-24 | Stop reason: SURG

## 2021-08-24 RX ORDER — OXYCODONE HCL 5 MG/5 ML
5 SOLUTION, ORAL ORAL
Status: COMPLETED | OUTPATIENT
Start: 2021-08-24 | End: 2021-08-24

## 2021-08-24 RX ORDER — SODIUM CHLORIDE, SODIUM LACTATE, POTASSIUM CHLORIDE, AND CALCIUM CHLORIDE .6; .31; .03; .02 G/100ML; G/100ML; G/100ML; G/100ML
30 INJECTION, SOLUTION INTRAVENOUS
Status: DISCONTINUED | OUTPATIENT
Start: 2021-08-24 | End: 2021-08-26 | Stop reason: HOSPADM

## 2021-08-24 RX ORDER — LABETALOL HYDROCHLORIDE 5 MG/ML
5 INJECTION, SOLUTION INTRAVENOUS
Status: DISCONTINUED | OUTPATIENT
Start: 2021-08-24 | End: 2021-08-24 | Stop reason: HOSPADM

## 2021-08-24 RX ORDER — CLINDAMYCIN PHOSPHATE 600 MG/50ML
600 INJECTION, SOLUTION INTRAVENOUS EVERY 8 HOURS
Status: DISCONTINUED | OUTPATIENT
Start: 2021-08-24 | End: 2021-08-24

## 2021-08-24 RX ORDER — GABAPENTIN 300 MG/1
300 CAPSULE ORAL ONCE
Status: COMPLETED | OUTPATIENT
Start: 2021-08-24 | End: 2021-08-24

## 2021-08-24 RX ORDER — DOXYCYCLINE 100 MG/1
100 TABLET ORAL EVERY 12 HOURS
Status: DISCONTINUED | OUTPATIENT
Start: 2021-08-24 | End: 2021-08-26 | Stop reason: HOSPADM

## 2021-08-24 RX ORDER — ACETAMINOPHEN 500 MG
1000 TABLET ORAL ONCE
Status: COMPLETED | OUTPATIENT
Start: 2021-08-24 | End: 2021-08-24

## 2021-08-24 RX ORDER — HYDRALAZINE HYDROCHLORIDE 20 MG/ML
5 INJECTION INTRAMUSCULAR; INTRAVENOUS
Status: DISCONTINUED | OUTPATIENT
Start: 2021-08-24 | End: 2021-08-24 | Stop reason: HOSPADM

## 2021-08-24 RX ORDER — DIPHENHYDRAMINE HYDROCHLORIDE 50 MG/ML
12.5 INJECTION INTRAMUSCULAR; INTRAVENOUS
Status: DISCONTINUED | OUTPATIENT
Start: 2021-08-24 | End: 2021-08-24 | Stop reason: HOSPADM

## 2021-08-24 RX ORDER — MAGNESIUM SULFATE HEPTAHYDRATE 40 MG/ML
2 INJECTION, SOLUTION INTRAVENOUS 2 TIMES DAILY
Status: COMPLETED | OUTPATIENT
Start: 2021-08-24 | End: 2021-08-24

## 2021-08-24 RX ADMIN — VANCOMYCIN HYDROCHLORIDE 3000 MG: 500 INJECTION, POWDER, LYOPHILIZED, FOR SOLUTION INTRAVENOUS at 03:43

## 2021-08-24 RX ADMIN — OXYCODONE HYDROCHLORIDE 10 MG: 5 SOLUTION ORAL at 14:46

## 2021-08-24 RX ADMIN — CEFTRIAXONE SODIUM 2 G: 1 INJECTION, POWDER, FOR SOLUTION INTRAMUSCULAR; INTRAVENOUS at 13:52

## 2021-08-24 RX ADMIN — GABAPENTIN 300 MG: 300 CAPSULE ORAL at 13:09

## 2021-08-24 RX ADMIN — SCOPALAMINE 1 PATCH: 1 PATCH, EXTENDED RELEASE TRANSDERMAL at 13:09

## 2021-08-24 RX ADMIN — MAGNESIUM SULFATE 2 G: 2 INJECTION INTRAVENOUS at 08:58

## 2021-08-24 RX ADMIN — CEFTRIAXONE SODIUM 2 G: 2 INJECTION, POWDER, FOR SOLUTION INTRAMUSCULAR; INTRAVENOUS at 16:35

## 2021-08-24 RX ADMIN — FENTANYL CITRATE 50 MCG: 50 INJECTION, SOLUTION INTRAMUSCULAR; INTRAVENOUS at 14:47

## 2021-08-24 RX ADMIN — MORPHINE SULFATE 3 MG: 4 INJECTION INTRAVENOUS at 03:12

## 2021-08-24 RX ADMIN — ONDANSETRON 8 MG: 2 INJECTION INTRAMUSCULAR; INTRAVENOUS at 13:57

## 2021-08-24 RX ADMIN — MORPHINE SULFATE 3 MG: 4 INJECTION INTRAVENOUS at 11:20

## 2021-08-24 RX ADMIN — SODIUM CHLORIDE, POTASSIUM CHLORIDE, SODIUM LACTATE AND CALCIUM CHLORIDE: 600; 310; 30; 20 INJECTION, SOLUTION INTRAVENOUS at 13:25

## 2021-08-24 RX ADMIN — CELECOXIB 400 MG: 200 CAPSULE ORAL at 13:09

## 2021-08-24 RX ADMIN — DEXAMETHASONE SODIUM PHOSPHATE 4 MG: 4 INJECTION, SOLUTION INTRA-ARTICULAR; INTRALESIONAL; INTRAMUSCULAR; INTRAVENOUS; SOFT TISSUE at 13:32

## 2021-08-24 RX ADMIN — INSULIN LISPRO 3 UNITS: 100 INJECTION, SOLUTION INTRAVENOUS; SUBCUTANEOUS at 21:12

## 2021-08-24 RX ADMIN — CLINDAMYCIN IN 5 PERCENT DEXTROSE 600 MG: 12 INJECTION, SOLUTION INTRAVENOUS at 07:55

## 2021-08-24 RX ADMIN — FENTANYL CITRATE 50 MCG: 50 INJECTION, SOLUTION INTRAMUSCULAR; INTRAVENOUS at 13:57

## 2021-08-24 RX ADMIN — ACETAMINOPHEN 1000 MG: 500 TABLET ORAL at 13:09

## 2021-08-24 RX ADMIN — PROPOFOL 150 MG: 10 INJECTION, EMULSION INTRAVENOUS at 13:30

## 2021-08-24 RX ADMIN — DOXYCYCLINE 100 MG: 100 TABLET, FILM COATED ORAL at 11:15

## 2021-08-24 RX ADMIN — INSULIN LISPRO 2 UNITS: 100 INJECTION, SOLUTION INTRAVENOUS; SUBCUTANEOUS at 18:07

## 2021-08-24 RX ADMIN — DOXYCYCLINE 100 MG: 100 TABLET, FILM COATED ORAL at 17:11

## 2021-08-24 RX ADMIN — FENTANYL CITRATE 50 MCG: 50 INJECTION, SOLUTION INTRAMUSCULAR; INTRAVENOUS at 13:34

## 2021-08-24 RX ADMIN — MIDAZOLAM HYDROCHLORIDE 2 MG: 1 INJECTION, SOLUTION INTRAMUSCULAR; INTRAVENOUS at 13:25

## 2021-08-24 RX ADMIN — SODIUM CHLORIDE: 9 INJECTION, SOLUTION INTRAVENOUS at 21:08

## 2021-08-24 RX ADMIN — MAGNESIUM SULFATE 2 G: 2 INJECTION INTRAVENOUS at 17:09

## 2021-08-24 RX ADMIN — NICOTINE 14 MG: 14 PATCH TRANSDERMAL at 06:14

## 2021-08-24 RX ADMIN — PIPERACILLIN AND TAZOBACTAM 3.38 G: 3; .375 INJECTION, POWDER, LYOPHILIZED, FOR SOLUTION INTRAVENOUS; PARENTERAL at 03:00

## 2021-08-24 ASSESSMENT — ENCOUNTER SYMPTOMS
BLOOD IN STOOL: 0
NAUSEA: 0
SHORTNESS OF BREATH: 0
CHILLS: 0
COUGH: 0
VOMITING: 0
ABDOMINAL PAIN: 1
FEVER: 0
MYALGIAS: 0

## 2021-08-24 ASSESSMENT — PAIN DESCRIPTION - PAIN TYPE
TYPE: SURGICAL PAIN
TYPE: ACUTE PAIN
TYPE: SURGICAL PAIN;ACUTE PAIN
TYPE: SURGICAL PAIN
TYPE: SURGICAL PAIN
TYPE: ACUTE PAIN

## 2021-08-24 NOTE — PROGRESS NOTES
4 Eyes Skin Assessment Completed by CAMACHO Chang and CAMACHO Acevedo.    Head WDL  Ears WDL  Nose WDL  Mouth WDL  Neck WDL  Breast/Chest WDL  Shoulder Blades WDL  Spine WDL  (R) Arm/Elbow/Hand WDL  (L) Arm/Elbow/Hand WDL  Abdomen WDL  Groin WDL  Scrotum/Coccyx/Buttocks WDL  (R) Leg WDL  (L) Leg WDL  (R) Heel/Foot/Toe Redness, Swelling and Edema  (L) Heel/Foot/Toe Edema              Interventions In Place N/A    Possible Skin Injury No    Pictures Uploaded Into Epic N/A  Wound Consult Placed N/A  RN Wound Prevention Protocol Ordered No

## 2021-08-24 NOTE — CARE PLAN
Problem: Knowledge Deficit - Standard  Goal: Patient and family/care givers will demonstrate understanding of plan of care, disease process/condition, diagnostic tests and medications  Outcome: Progressing     Problem: Pain - Standard  Goal: Alleviation of pain or a reduction in pain to the patient’s comfort goal  Outcome: Progressing   The patient is Stable - Low risk of patient condition declining or worsening         Progress made toward(s) clinical / shift goals:      Patient is not progressing towards the following goals:

## 2021-08-24 NOTE — ASSESSMENT & PLAN NOTE
This is Sepsis Present on admission  SIRS criteria identified on my evaluation include: Tachycardia, with heart rate greater than 90 BPM and Leukopenia, with WBC less than 4,000  Source is PID/Cellulitis  Sepsis protocol initiated  Fluid resuscitation ordered per protocol  IV antibiotics as appropriate for source of sepsis  While organ dysfunction may be noted elsewhere in this problem list or in the chart, degree of organ dysfunction does not meet CMS criteria for severe sepsis  RESOLVED

## 2021-08-24 NOTE — ANESTHESIA PREPROCEDURE EVALUATION
40yo F with left ankle wound here for I&D    Relevant Problems   ENDO   (positive) Type 2 diabetes mellitus with hyperglycemia, without long-term current use of insulin (HCC)      Other   (positive) Smoker       Physical Exam    Airway   Mallampati: II  TM distance: >3 FB  Neck ROM: full       Cardiovascular - normal exam  Rhythm: regular  Rate: normal  (-) murmur     Dental - normal exam  Comments: VERY loose front top tooth; high risk of damage    Very poor dentition   Pulmonary - normal exam  Breath sounds clear to auscultation     Abdominal    Neurological - normal exam               Anesthesia Plan    ASA 3- EMERGENT   ASA physical status 3 criteria: diabetes - poorly controlledASA physical status emergent criteria: acute deteriorating condition due to infection    Plan - general       Airway plan will be LMA          Induction: intravenous    Postoperative Plan: Postoperative administration of opioids is intended.    Pertinent diagnostic labs and testing reviewed    Informed Consent:    Anesthetic plan and risks discussed with patient.    Use of blood products discussed with: patient whom consented to blood products.

## 2021-08-24 NOTE — ED NOTES
Med Rec completed: per pt at bedside.  PT reports no medications taken at home currently    No ORAL antibiotics in last 30 days    Preferred [facility]    Pt confirmed following allergies:  Allergies   Allergen Reactions   • Vicodin [Hydrocodone-Acetaminophen] Vomiting        Pt's home medications:   No current facility-administered medications on file prior to encounter.     Removed medications:   Medication Removal Reason   • [DISCONTINUED] benzonatate (TESSALON) 100 MG Cap   [DISCONTINUED] Pseudoephedrine-Naproxen Na (ALEVE-D SINUS & COLD) 120-220 MG TABLET SR 12 HR   [DISCONTINUED] albuterol 108 (90 Base) MCG/ACT Aero Soln inhalation aerosol   [DISCONTINUED] cyclobenzaprine (FLEXERIL) 10 MG Tab   [DISCONTINUED] busPIRone (BUSPAR) 5 MG Tab   [DISCONTINUED] paroxetine (PAXIL) 20 MG Tab   [DISCONTINUED] lorazepam (ATIVAN) 1 MG Tab   [DISCONTINUED] meclizine (ANTIVERT) 25 MG Tab   [DISCONTINUED] busPIRone (BUSPAR) 10 MG Tab   [DISCONTINUED] paroxetine (PAXIL) 20 MG Tab   [DISCONTINUED] metformin (GLUCOPHAGE) 500 MG Tab   [DISCONTINUED] trazodone (DESYREL) 50 MG Tab   [DISCONTINUED] metformin (GLUCOPHAGE) 500 MG TABS   [DISCONTINUED] trazodone (DESYREL) 50 MG TABS    Pt reports no medications taken at home currently.

## 2021-08-24 NOTE — PROGRESS NOTES
Back to room via hospital bed from pacu. AAO, not in any distress. Left foot/ankle drsg cdi, +cms. VSS, on 2L per NC sats >94%.

## 2021-08-24 NOTE — PROGRESS NOTES
"Pharmacy Vancomycin Kinetics Note for 8/24/2021     41 y.o. female on Vancomycin day # 1     Vancomycin Indication (AUC Dosing): Skin/skin structure infection    Provider specified end date: 08/29/21    Active Antibiotics (From admission, onward)    Ordered     Ordering Provider       Tue Aug 24, 2021  6:42 AM    08/24/21 0642  vancomycin (VANCOCIN) 1,750 mg in  mL IVPB  (vancomycin (VANCOCIN) IV (LD + Maintenance))  EVERY 12 HOURS         Kel Sommers M.D.       Tue Aug 24, 2021  2:43 AM    08/24/21 0243  vancomycin (VANCOCIN) 3,000 mg in  mL IVPB  (vancomycin (VANCOCIN) IV (LD + Maintenance))  ONCE         Kel Sommers M.D.       Tue Aug 24, 2021  2:19 AM    08/24/21 0219  MD Alert...Vancomycin per Pharmacy  PHARMACY TO DOSE        Question:  Indication(s) for vancomycin?  Answer:  Skin and soft tissue infection    Kel Sommers M.D.    08/24/21 0219  piperacillin-tazobactam (ZOSYN) 3.375 g in  mL IVPB  (piperacillin-tazobactam (ZOSYN) IV (Extended-infusion) PANEL )  EVERY 8 HOURS        \"And\" Linked Group Details    Kel Sommers M.D.       Tue Aug 24, 2021  2:07 AM    08/24/21 0207  clindamycin (CLEOCIN) IVPB premix 600 mg  EVERY 8 HOURS         Kel Sommers M.D.          Dosing Weight: 118 kg (260 lb 2.3 oz)      Admission History: Admitted on 8/23/2021 for Cellulitis [L03.90]  Pertinent history: Pt w/ recent chlamydia and gonorrhea dx at  presents w/ diabetic foot infection w/ worsening erythema, ever PTA.  Ceftriaxone and Doxycycline initiated in ED for cross coverage, escalated to Vancomycin, Piperacillin/Tazobactam, Clindamycin for concern for necrotizing fasciitis.    Allergies:     Vicodin [hydrocodone-acetaminophen]     Pertinent cultures to date:     Results     Procedure Component Value Units Date/Time    MRSA By PCR (Amp) [[id number]]     Order Status: Sent Specimen: Respirate from Nares     BLOOD CULTURE [[id number]] Collected: 08/23/21 1705    Order Status: Completed Specimen: Blood from " "Peripheral Updated: 21    Narrative:      1 of 2 for Blood Culture x 2 sites order. Per Hospital  Policy: Only change Specimen Src: to \"Line\" if specified by  physician order.    BLOOD CULTURE [[id number]]     Order Status: Sent Specimen: Blood from Peripheral           Labs:     Estimated Creatinine Clearance: 188.5 mL/min (by C-G formula based on SCr of 0.53 mg/dL).  Recent Labs     21  17021  0516   WBC 6.6 3.7*   NEUTSPOLYS 80.20* 65.40     Recent Labs     21  17021  0516   BUN 5* 6*   CREATININE 0.58 0.53   ALBUMIN 3.7  --      No intake or output data in the 24 hours ending 21 0643   /61   Pulse 88   Temp 35.9 °C (96.7 °F) (Temporal)   Resp 19   Ht 1.727 m (5' 8\")   Wt 118 kg (260 lb 12.9 oz)   SpO2 88%  Temp (24hrs), Av °C (96.8 °F), Min:35.8 °C (96.5 °F), Max:36.2 °C (97.2 °F)      List concerns for Vancomycin clearance:     Nephrotoxic drugs;Obesity    Pharmacokinetics:     AUC kinetics:   Ke (hr ^-1): 0.1609 hr^-1  Half life: 4.31 hr  Clearance: 6.825  Estimated TDD: 3412.5  Estimated Dose: 896  Estimated interval: 6.3    A/P:     -  Vancomycin dose: 1750mg q12 hours (0000, 1200)    -  Next vancomycin level(s):    -TBD by floor pharmacist    -  Predicted vancomycin AUC from initial AUC test calculator: 513 mg·hr/L    -  Comments: Minimal concerns for clearance, although BMI is 40.  Please account for potential intra-operative dosing should patient undergo surgery.  Pharmacy will continue to monitor.      Junior Donnelly, PharmD  "

## 2021-08-24 NOTE — CONSULTS
Date of Service: 08/24/21    Jc Ramos was seen today in consultation for left ankle swelling at the request of Dr. Sommers    CC: Left ankle pain    HPI: Jc Ramos is a 41 y.o. female who presents with complaints of pain to left ankle.  This started over the last week after no known injury.  The pain is 7/10 and is described as sharp.  The pain is made worse by palpation of the area and made better by rest and immobilization. Denies history of previous abscesses.    PMH:   Past Medical History:   Diagnosis Date   • Diabetes        PSH:   Past Surgical History:   Procedure Laterality Date   • CHOLECYSTECTOMY     • PB C-SEC ONLY,PBEV C-SEC         FH:   Family History   Problem Relation Age of Onset   • Diabetes Mother        SH:   Social History     Socioeconomic History   • Marital status:      Spouse name: Not on file   • Number of children: Not on file   • Years of education: Not on file   • Highest education level: Not on file   Occupational History   • Not on file   Tobacco Use   • Smoking status: Current Every Day Smoker     Packs/day: 0.50     Types: Cigarettes   • Smokeless tobacco: Never Used   • Tobacco comment: 1/2 ppd since age 16   Vaping Use   • Vaping Use: Never used   Substance and Sexual Activity   • Alcohol use: No   • Drug use: No   • Sexual activity: Not on file   Other Topics Concern   • Not on file   Social History Narrative   • Not on file     Social Determinants of Health     Financial Resource Strain: High Risk   • Difficulty of Paying Living Expenses: Hard   Food Insecurity: Food Insecurity Present   • Worried About Running Out of Food in the Last Year: Often true   • Ran Out of Food in the Last Year: Often true   Transportation Needs: Unmet Transportation Needs   • Lack of Transportation (Medical): Yes   • Lack of Transportation (Non-Medical): No   Physical Activity:    • Days of Exercise per Week:    • Minutes of Exercise per Session:    Stress:    • Feeling of Stress :   "  Social Connections:    • Frequency of Communication with Friends and Family:    • Frequency of Social Gatherings with Friends and Family:    • Attends Synagogue Services:    • Active Member of Clubs or Organizations:    • Attends Club or Organization Meetings:    • Marital Status:    Intimate Partner Violence:    • Fear of Current or Ex-Partner:    • Emotionally Abused:    • Physically Abused:    • Sexually Abused:        ROS: In review of the following systems: Constitutional, Eyes, ENT, Cardiovascular,Respiratory, GI, , Musculoskeletal, Skin, Neuro, Psych, Hematologic, Endocrine, Allergic; no pertinent findings were found related to the chief complaint and orthopedic injury     /61   Pulse 88   Temp 35.9 °C (96.7 °F) (Temporal)   Resp 19   Ht 1.727 m (5' 8\")   Wt 118 kg (260 lb 12.9 oz)   SpO2 88%     Physical Exam:  General: Well nourished, well developed, age appropriate appearance   HEENT: Normocephalic, atraumatic  Psych: Normal mood and affect  Neck: Supple, no pain to motion  Chest/Pulmonary: breathing unlabored, no audible wheezing  Cardio: Regular heart rate and rhythm  Neuro: Sensation grossly intact to BUE and BLE, moving all four extremities  Skin: medial left ankle blister and swelling  MSK: no pain to prom of left ankle, no fluctuance, ttp over the medial ankle, no ttp at knee; RLE and BUE are atraumatic    Imaging and labs: xrays and CT show no discrete abscess but subcu air near the medial mal    Recent Labs     08/23/21  1705 08/24/21  0516   WBC 6.6 3.7*   RBC 4.57 4.13*   HEMOGLOBIN 12.5 11.3*   HEMATOCRIT 39.3 35.7*   MCV 86.0 86.4   MCH 27.4 27.4   RDW 42.9 43.3   PLATELETCT 171 146*   MPV 11.2 11.7   NEUTSPOLYS 80.20* 65.40   LYMPHOCYTES 12.60* 24.20   MONOCYTES 5.30 7.60   EOSINOPHILS 1.40 2.20   BASOPHILS 0.20 0.30       Assessment:   1. Cellulitis in diabetic foot (HCC)     2. Hyperglycemia     3. Gonorrhea     4. Chlamydia trachomatis infection of lower genitourinary sites  "        We discussed the diagnosis and findings with the patient at length.  We reviewed possible non operative and operative interventions and the risks and benefits of each of these.  I recommended I&D in the OR for cultures and debridement.  She had a chance to ask questions and all of these were answered to her satisfaction.        Plan:  NPO  Debridement and cultures  Abx  ACE wrap and wound care

## 2021-08-24 NOTE — ASSESSMENT & PLAN NOTE
Abx continued as Ceftriax/doxy  Appears c/w small abrasion/route of entry with overgrown skin  Discussed w ID pharm  MRSA nasal screen neg  No abscess noted on CT; abscess found in I & D 8/24 8/25 No growth on cx yet

## 2021-08-24 NOTE — PROGRESS NOTES
Asked by Dr. Khan to oversee orthopaedic care  Left medial ankle abscess and leg cellulitis  Plan I&D

## 2021-08-24 NOTE — PROGRESS NOTES
Assumed care @ 0715. Resting in bed and in no distress. NPO since 0500 per report. Bed in low position, call light w/in reach.

## 2021-08-24 NOTE — ANESTHESIA PROCEDURE NOTES
Airway    Date/Time: 8/24/2021 1:30 PM  Performed by: Radha Wilburn M.D.  Authorized by: Radha Wilburn M.D.     Location:  OR  Urgency:  Elective  Indications for Airway Management:  Anesthesia      Spontaneous Ventilation: absent    Sedation Level:  Deep  Preoxygenated: Yes    Mask Difficulty Assessment:  0 - not attempted  Final Airway Type:  Supraglottic airway  Final Supraglottic Airway:  Standard LMA    SGA Size:  4  Airway Seal Pressure (cm H2O):  25  Number of Attempts at Approach:  1

## 2021-08-24 NOTE — OR NURSING
Patient recovered well in post-op. AAOx4. VSS, on 2L via NC. Surgical sites RENARD. Surgical dressing in place CDI. L foot/ankle warm/red, cap refill < 3 seconds, 3+ edema, sensation intact, L popliteal pulse 1+. Surgical pain managed through PO and IV medications. Patient able to intake fluids without nausea. Patient declined family/friend update. No patient belongings in recovery. Report called to CAMACHO Gonzalez. Patient transported to [facility]. O2 tank 1/2 full for transport.

## 2021-08-24 NOTE — PROGRESS NOTES
[facility] Medicine Daily Progress Note    Date of Service  8/24/2021    Chief Complaint  Jc Ramos is a 41 y.o. female admitted 8/23/2021 with cellulitis and PID    Hospital Course  Patient is a 41-year-old female with history of diabetes and obesity who developed spontaneous onset left medial ankle swelling and pain.  She was also contacted by the urgent care and informed that her chlamydia and gonorrhea test were positive.  She says she was given a course of Bactrim.  In the ER white count was normal.  She was afebrile.  Pulse was 115.  Respiratory rate was normal.  She was started on antibiotics and admitted.    CT scan of the left lower extremity showed soft tissue gas just distal to the medial malleolus.  No abscess was identified.  Orthopedics was consulted and recommended incision and drainage.    Interval Problem Update  Afebrile overnight  Patient in no acute distress  WBCs 3.7.  Vital signs stable on room air.  Normal respiratory rate.  Pulse did reach 93 today and with a WBC of 3.7 technically she has 2 criteria for sepsis.  However this is mild this patient is hemodynamically stable.      Consultants/Specialty  orthopedics    Code Status  Full Code    Disposition  Patient is not medically cleared.   Anticipate discharge to to home with close outpatient follow-up.  I have placed the appropriate orders for post-discharge needs.    Review of Systems  Review of Systems   Constitutional: Negative for chills and fever.   Respiratory: Negative for cough and shortness of breath.    Cardiovascular: Negative for chest pain.   Gastrointestinal: Positive for abdominal pain. Negative for blood in stool, melena, nausea and vomiting.   Genitourinary: Negative for dysuria and urgency.   Musculoskeletal: Negative for myalgias.   Skin: Positive for rash.   All other systems reviewed and are negative.       Physical Exam  Temp:  [35.9 °C (96.7 °F)-36.4 °C (97.5 °F)] 36.4 °C (97.5 °F)  Pulse:  [] 93  Resp:   [16-19] 18  BP: (105-134)/(57-67) 105/64  SpO2:  [82 %-95 %] 92 %    Physical Exam  Vitals and nursing note reviewed.   Constitutional:       General: She is not in acute distress.  HENT:      Head: Normocephalic and atraumatic.      Nose: Nose normal.   Eyes:      Pupils: Pupils are equal, round, and reactive to light.   Cardiovascular:      Rate and Rhythm: Normal rate and regular rhythm.      Heart sounds: Normal heart sounds.   Pulmonary:      Effort: Pulmonary effort is normal.      Breath sounds: Normal breath sounds.   Abdominal:      General: There is no distension.      Palpations: Abdomen is soft.   Musculoskeletal:      Cervical back: Neck supple.   Neurological:      Mental Status: She is alert and oriented to person, place, and time.   Psychiatric:         Mood and Affect: Mood normal.         Fluids  No intake or output data in the 24 hours ending 08/24/21 1352    Laboratory  Recent Labs     08/23/21  1705 08/24/21  0516   WBC 6.6 3.7*   RBC 4.57 4.13*   HEMOGLOBIN 12.5 11.3*   HEMATOCRIT 39.3 35.7*   MCV 86.0 86.4   MCH 27.4 27.4   MCHC 31.8* 31.7*   RDW 42.9 43.3   PLATELETCT 171 146*   MPV 11.2 11.7     Recent Labs     08/23/21  1705 08/24/21  0516   SODIUM 134* 132*   POTASSIUM 3.8 3.6   CHLORIDE 96 98   CO2 26 26   GLUCOSE 142* 122*   BUN 5* 6*   CREATININE 0.58 0.53   CALCIUM 9.5 8.4*     Recent Labs     08/24/21  0516   INR 1.02               Imaging  CT-EXTREMITY, LOWER WITH LEFT   Final Result      1.  Soft tissue gas just distal to the medial malleolus. Assuming there is no open wound this finding is consistent with gas-forming infection      2.  No abscess      3.  Cellulitis      DX-FOOT-COMPLETE 3+ LEFT   Final Result         1.  No radiographic evidence of acute injury.      2. Soft tissue swelling and subcutaneous air noted in medial aspect of left ankle suspicious for subcutaneous infection or abscess.           Assessment/Plan  * Cellulitis- (present on admission)  Assessment &  Plan  Abx continued as Ceftriax/doxy  Appears c/w small abrasion/route of entry with overgrown skin  Discussed w ID pharm  MRSA nasal screen  No abscess noted on CT      Sepsis (HCC)  Assessment & Plan  This is Sepsis Present on admission  SIRS criteria identified on my evaluation include: Tachycardia, with heart rate greater than 90 BPM and Leukopenia, with WBC less than 4,000  Source is PID/Cellulitis  Sepsis protocol initiated  Fluid resuscitation ordered per protocol  IV antibiotics as appropriate for source of sepsis  While organ dysfunction may be noted elsewhere in this problem list or in the chart, degree of organ dysfunction does not meet CMS criteria for severe sepsis          Pelvic inflammatory disease (PID)  Assessment & Plan  Cont Ceftriax/doxy  RPR neg; HIV pending      Sexually transmitted disease  Assessment & Plan  Patient reports he recently went to urgent care was diagnosed with chlamydia and gonorrhea  Has not started antibiotics at this time  -Continue with doxycycline and Rocephin    Smoker- (present on admission)  Assessment & Plan  I counseled the patient for 7 minutes regarding smoking cessation using methods such as nicotine replacement therapy with patches and gum and medications such as Wellbutrin or Chantix.  I also discussed with pt breathing techniques and meditation as well as regular physical activity, which will assist pt  with smoking cessation.  Patient is agreeable to trial of nicotine replacement therapy.    Start nicotine replacement therapy with patches and gum.      Hyponatremia- (present on admission)  Assessment & Plan  Continue with IV fluids      Type 2 diabetes mellitus with hyperglycemia, without long-term current use of insulin (HCC)- (present on admission)  Assessment & Plan  Not on medications at home  -A1c  -ISS    Abnormal x-ray- (present on admission)  Assessment & Plan  CT w soft tissue gas         VTE prophylaxis: heparin ppx    I have performed a physical exam  and reviewed and updated ROS and Plan today (8/24/2021). In review of yesterday's note (8/23/2021), there are no changes except as documented above.

## 2021-08-24 NOTE — PROGRESS NOTES
To pre op via hospital bed w/ pt transport in stable condition. Armband in place to right wrist. CHG bath completed. Pt in hospital gown, IV saline lock.

## 2021-08-24 NOTE — HOSPITAL COURSE
Patient is a 41-year-old female with history of diabetes and obesity who developed spontaneous onset left medial ankle swelling and pain.  She was also contacted by the urgent care and informed that her chlamydia and gonorrhea test were positive.  She says she was given a course of Bactrim.  In the ER white count was normal.  She was afebrile.  Pulse was 115.  Respiratory rate was normal.  She was started on antibiotics and admitted.    CT scan of the left lower extremity showed soft tissue gas just distal to the medial malleolus.  No abscess was identified.  Orthopedics was consulted and recommended incision and drainage.  She underwent surgical exploration where a deep abscess was found and copiously irrigated.  Cultures were sent.  The distal aspect of the incision was left open and packed with half-inch plain packing material.    Unfortunately, the patient is homeless and lives in a tent down by the [facility].  She is eager for discharge and she is concerned her belongings will be stolen.  Cultures are growing Bacillus species. WBC is normal. PCT normal. HIV is negative.    Should be given a prescription for Augmentin and doxycycline to complete a 14-day course for PID and 10-day course for cellulitis.  She has been set up with the wound care clinic.  She has been given wound care packing supplies.  She plans to stay with a friend at a motel to keep her foot clean.  She has been given the contact information to orthopedics and [facility].  Medications have been delivered to the bedside.

## 2021-08-25 LAB
ANION GAP SERPL CALC-SCNC: 8 MMOL/L (ref 7–16)
BUN SERPL-MCNC: 8 MG/DL (ref 8–22)
CALCIUM SERPL-MCNC: 8 MG/DL (ref 8.5–10.5)
CHLORIDE SERPL-SCNC: 103 MMOL/L (ref 96–112)
CO2 SERPL-SCNC: 23 MMOL/L (ref 20–33)
CREAT SERPL-MCNC: 0.43 MG/DL (ref 0.5–1.4)
ERYTHROCYTE [DISTWIDTH] IN BLOOD BY AUTOMATED COUNT: 41.8 FL (ref 35.9–50)
GLUCOSE BLD-MCNC: 158 MG/DL (ref 65–99)
GLUCOSE BLD-MCNC: 161 MG/DL (ref 65–99)
GLUCOSE BLD-MCNC: 162 MG/DL (ref 65–99)
GLUCOSE BLD-MCNC: 241 MG/DL (ref 65–99)
GLUCOSE SERPL-MCNC: 146 MG/DL (ref 65–99)
GRAM STN SPEC: NORMAL
GRAM STN SPEC: NORMAL
HCG SERPL QL: NEGATIVE
HCT VFR BLD AUTO: 36.6 % (ref 37–47)
HGB BLD-MCNC: 12 G/DL (ref 12–16)
HIV 1+2 AB+HIV1 P24 AG SERPL QL IA: NORMAL
IRON SATN MFR SERPL: 13 % (ref 15–55)
IRON SERPL-MCNC: 30 UG/DL (ref 40–170)
MCH RBC QN AUTO: 28.2 PG (ref 27–33)
MCHC RBC AUTO-ENTMCNC: 32.8 G/DL (ref 33.6–35)
MCV RBC AUTO: 85.9 FL (ref 81.4–97.8)
PLATELET # BLD AUTO: 159 K/UL (ref 164–446)
PMV BLD AUTO: 11.5 FL (ref 9–12.9)
POTASSIUM SERPL-SCNC: 4.3 MMOL/L (ref 3.6–5.5)
PROCALCITONIN SERPL-MCNC: <0.05 NG/ML
RBC # BLD AUTO: 4.26 M/UL (ref 4.2–5.4)
SIGNIFICANT IND 70042: NORMAL
SIGNIFICANT IND 70042: NORMAL
SITE SITE: NORMAL
SITE SITE: NORMAL
SODIUM SERPL-SCNC: 134 MMOL/L (ref 135–145)
SOURCE SOURCE: NORMAL
SOURCE SOURCE: NORMAL
TIBC SERPL-MCNC: 223 UG/DL (ref 250–450)
UIBC SERPL-MCNC: 193 UG/DL (ref 110–370)
VIT B12 SERPL-MCNC: 875 PG/ML (ref 211–911)
WBC # BLD AUTO: 5.3 K/UL (ref 4.8–10.8)

## 2021-08-25 PROCEDURE — 85027 COMPLETE CBC AUTOMATED: CPT

## 2021-08-25 PROCEDURE — 96372 THER/PROPH/DIAG INJ SC/IM: CPT

## 2021-08-25 PROCEDURE — 84145 PROCALCITONIN (PCT): CPT

## 2021-08-25 PROCEDURE — 700105 HCHG RX REV CODE 258: Performed by: NURSE PRACTITIONER

## 2021-08-25 PROCEDURE — 99225 PR SUBSEQUENT OBSERVATION CARE,LEVEL II: CPT | Performed by: NURSE PRACTITIONER

## 2021-08-25 PROCEDURE — 83550 IRON BINDING TEST: CPT

## 2021-08-25 PROCEDURE — 83540 ASSAY OF IRON: CPT

## 2021-08-25 PROCEDURE — 84703 CHORIONIC GONADOTROPIN ASSAY: CPT

## 2021-08-25 PROCEDURE — 700111 HCHG RX REV CODE 636 W/ 250 OVERRIDE (IP): Performed by: NURSE PRACTITIONER

## 2021-08-25 PROCEDURE — 87389 HIV-1 AG W/HIV-1&-2 AB AG IA: CPT

## 2021-08-25 PROCEDURE — 700102 HCHG RX REV CODE 250 W/ 637 OVERRIDE(OP): Performed by: NURSE PRACTITIONER

## 2021-08-25 PROCEDURE — A9270 NON-COVERED ITEM OR SERVICE: HCPCS | Performed by: STUDENT IN AN ORGANIZED HEALTH CARE EDUCATION/TRAINING PROGRAM

## 2021-08-25 PROCEDURE — 82962 GLUCOSE BLOOD TEST: CPT | Mod: 91

## 2021-08-25 PROCEDURE — 96376 TX/PRO/DX INJ SAME DRUG ADON: CPT

## 2021-08-25 PROCEDURE — 99024 POSTOP FOLLOW-UP VISIT: CPT | Performed by: ORTHOPAEDIC SURGERY

## 2021-08-25 PROCEDURE — 700111 HCHG RX REV CODE 636 W/ 250 OVERRIDE (IP): Performed by: STUDENT IN AN ORGANIZED HEALTH CARE EDUCATION/TRAINING PROGRAM

## 2021-08-25 PROCEDURE — 80048 BASIC METABOLIC PNL TOTAL CA: CPT

## 2021-08-25 PROCEDURE — 700105 HCHG RX REV CODE 258: Performed by: STUDENT IN AN ORGANIZED HEALTH CARE EDUCATION/TRAINING PROGRAM

## 2021-08-25 PROCEDURE — 82607 VITAMIN B-12: CPT

## 2021-08-25 PROCEDURE — 700102 HCHG RX REV CODE 250 W/ 637 OVERRIDE(OP): Performed by: STUDENT IN AN ORGANIZED HEALTH CARE EDUCATION/TRAINING PROGRAM

## 2021-08-25 PROCEDURE — 97161 PT EVAL LOW COMPLEX 20 MIN: CPT

## 2021-08-25 PROCEDURE — G0378 HOSPITAL OBSERVATION PER HR: HCPCS

## 2021-08-25 PROCEDURE — A9270 NON-COVERED ITEM OR SERVICE: HCPCS | Performed by: NURSE PRACTITIONER

## 2021-08-25 RX ORDER — OMEPRAZOLE 20 MG/1
20 CAPSULE, DELAYED RELEASE ORAL DAILY
Status: DISCONTINUED | OUTPATIENT
Start: 2021-08-26 | End: 2021-08-26 | Stop reason: HOSPADM

## 2021-08-25 RX ADMIN — INSULIN LISPRO 3 UNITS: 100 INJECTION, SOLUTION INTRAVENOUS; SUBCUTANEOUS at 12:45

## 2021-08-25 RX ADMIN — DOXYCYCLINE 100 MG: 100 TABLET, FILM COATED ORAL at 18:00

## 2021-08-25 RX ADMIN — INSULIN LISPRO 2 UNITS: 100 INJECTION, SOLUTION INTRAVENOUS; SUBCUTANEOUS at 08:27

## 2021-08-25 RX ADMIN — MORPHINE SULFATE 3 MG: 4 INJECTION INTRAVENOUS at 15:07

## 2021-08-25 RX ADMIN — METFORMIN HYDROCHLORIDE 500 MG: 500 TABLET ORAL at 17:53

## 2021-08-25 RX ADMIN — DOCUSATE SODIUM 50 MG AND SENNOSIDES 8.6 MG 2 TABLET: 8.6; 5 TABLET, FILM COATED ORAL at 17:48

## 2021-08-25 RX ADMIN — INSULIN LISPRO 2 UNITS: 100 INJECTION, SOLUTION INTRAVENOUS; SUBCUTANEOUS at 20:30

## 2021-08-25 RX ADMIN — INSULIN LISPRO 2 UNITS: 100 INJECTION, SOLUTION INTRAVENOUS; SUBCUTANEOUS at 17:49

## 2021-08-25 RX ADMIN — SODIUM CHLORIDE: 9 INJECTION, SOLUTION INTRAVENOUS at 08:33

## 2021-08-25 RX ADMIN — NICOTINE 14 MG: 14 PATCH TRANSDERMAL at 05:05

## 2021-08-25 RX ADMIN — ONDANSETRON 4 MG: 2 INJECTION INTRAMUSCULAR; INTRAVENOUS at 15:06

## 2021-08-25 RX ADMIN — DOXYCYCLINE 100 MG: 100 TABLET, FILM COATED ORAL at 05:05

## 2021-08-25 RX ADMIN — METFORMIN HYDROCHLORIDE 500 MG: 500 TABLET ORAL at 10:45

## 2021-08-25 RX ADMIN — CEFTRIAXONE SODIUM 2 G: 2 INJECTION, POWDER, FOR SOLUTION INTRAMUSCULAR; INTRAVENOUS at 17:46

## 2021-08-25 ASSESSMENT — PAIN DESCRIPTION - PAIN TYPE: TYPE: ACUTE PAIN

## 2021-08-25 ASSESSMENT — COGNITIVE AND FUNCTIONAL STATUS - GENERAL
SUGGESTED CMS G CODE MODIFIER MOBILITY: CH
MOBILITY SCORE: 24

## 2021-08-25 ASSESSMENT — GAIT ASSESSMENTS
GAIT LEVEL OF ASSIST: SUPERVISED
DISTANCE (FEET): 200
ASSISTIVE DEVICE: SINGLE POINT CANE

## 2021-08-25 ASSESSMENT — ENCOUNTER SYMPTOMS
NAUSEA: 0
BLOOD IN STOOL: 0
COUGH: 0
SHORTNESS OF BREATH: 0
VOMITING: 0
MYALGIAS: 0
CHILLS: 0
FEVER: 0

## 2021-08-25 ASSESSMENT — PAIN SCALES - WONG BAKER: WONGBAKER_NUMERICALRESPONSE: HURTS A LITTLE MORE

## 2021-08-25 NOTE — ANESTHESIA TIME REPORT
Anesthesia Start and Stop Event Times     Date Time Event    8/24/2021 1305 Ready for Procedure     1325 Anesthesia Start     1406 Anesthesia Stop        Responsible Staff  08/24/21    Name Role Begin End    Radha Wilburn M.D. Anesth 1325 1406        Preop Diagnosis (Free Text):  Pre-op Diagnosis     left ankle abscess         Preop Diagnosis (Codes):    Post op Diagnosis  Foot infection      Premium Reason  Non-Premium    Comments:

## 2021-08-25 NOTE — ANESTHESIA POSTPROCEDURE EVALUATION
Patient: Jc Ramos    Procedure Summary     Date: 08/24/21 Room / Location: [place] [address] / SURGERY [facility]    Anesthesia Start: 1325 Anesthesia Stop: 1406    Procedure: IRRIGATION AND DEBRIDEMENT ANKLE (Left Ankle) Diagnosis: (left ankle abscess )    Surgeons: Vega Douglas M.D. Responsible Provider: Radha Wilburn M.D.    Anesthesia Type: general ASA Status: 3 - Emergent          Final Anesthesia Type: general  Last vitals  BP   Blood Pressure: 128/71    Temp   36.2 °C (97.2 °F)    Pulse   76   Resp   18    SpO2   98 %      Anesthesia Post Evaluation    Patient location during evaluation: PACU  Patient participation: complete - patient participated  Level of consciousness: awake and alert    Airway patency: patent  Anesthetic complications: no  Cardiovascular status: hemodynamically stable  Respiratory status: acceptable  Hydration status: euvolemic    PONV: none          No complications documented.     Nurse Pain Score: 5 (NPRS)

## 2021-08-25 NOTE — PROGRESS NOTES
"   Orthopaedic Progress Note    Interval changes:  Patient seen and examined  Dressings CDI- to be changed daily by wound/RN - packing to be removed and replaced with change    ROS - Patient denies any new issues.  Pain well controlled.    /67   Pulse 79   Temp 36.4 °C (97.6 °F) (Temporal)   Resp 18   Ht 1.727 m (5' 8\")   Wt 118 kg (260 lb 12.9 oz)   SpO2 92%       Patient seen and examined  No acute distress  Breathing non labored  RRR  LLE ankle dressing CDI, DNVI, moves all toes, cap refill <2 sec.     Recent Labs     08/23/21  1705 08/24/21  0516 08/25/21  0444   WBC 6.6 3.7* 5.3   RBC 4.57 4.13* 4.26   HEMOGLOBIN 12.5 11.3* 12.0   HEMATOCRIT 39.3 35.7* 36.6*   MCV 86.0 86.4 85.9   MCH 27.4 27.4 28.2   MCHC 31.8* 31.7* 32.8*   RDW 42.9 43.3 41.8   PLATELETCT 171 146* 159*   MPV 11.2 11.7 11.5       Active Hospital Problems    Diagnosis    • Sepsis (HCC) [A41.9]    • Pelvic inflammatory disease (PID) [N73.9]    • Macrocytic anemia [D53.9]    • Cellulitis [L03.90]    • Abnormal x-ray [R93.89]    • Type 2 diabetes mellitus with hyperglycemia, without long-term current use of insulin (HCC) [E11.65]    • Hyponatremia [E87.1]    • Smoker [F17.200]    • Sexually transmitted disease [A64]        Assessment/Plan:  Cultures positive for GPC  Cleared for DC by ortho pending medicine/ID team kaye  POD#1 S/P  Incision and drainage of left ankle.  Wt bearing status - WBAT  Wound care/Drains - daily changes including packing  Future Procedures - none planned   Sutures/Staples out- 10-14 days post operatively  PT/OT-initiated  Antibiotics: rocephin 2g IV QD, doxy 100mg q12  DVT Prophylaxis- TEDS/SCDs/Foot pumps  Tate-none  Case Coordination for Discharge Planning - Disposition home   "

## 2021-08-25 NOTE — PROGRESS NOTES
Dressing change on LLE performed. Incision site clean, dry, and intact. Pt reported increased pain during dressing change, but tolerated well. Open portion of wound packed w/ ribbon dressing per order & dressed with gauze.

## 2021-08-25 NOTE — CARE PLAN
Problem: Knowledge Deficit - Standard  Goal: Patient and family/care givers will demonstrate understanding of plan of care, disease process/condition, diagnostic tests and medications  Outcome: Progressing     Problem: Pain - Standard  Goal: Alleviation of pain or a reduction in pain to the patient’s comfort goal  Outcome: Progressing   The patient is Stable - Low risk of patient condition declining or worsening    Shift Goals  Clinical Goals: pain control  Patient Goals: rest/pain control    Progress made toward(s) clinical / shift goals:      Patient is not progressing towards the following goals:

## 2021-08-25 NOTE — OP REPORT
DATE OF SERVICE:  08/24/2021     PREOPERATIVE DIAGNOSES:  1.  Left medial ankle abscess.  2.  Left leg cellulitis.  3.  Diabetes mellitus.  4.  Obesity.     POSTOPERATIVE DIAGNOSES:  1.  Left medial ankle abscess.  2.  Left leg cellulitis.  3.  Diabetes mellitus.  4.  Obesity.     SURGICAL PROCEDURE:  Incision and drainage of left ankle.     SURGEON:  Vega Douglas MD.     ASSISTANT:  None.     ANESTHESIOLOGIST:  Radha Wilburn MD.     ANESTHETIC:  General.     ESTIMATED BLOOD LOSS:  5 mL.     INDICATIONS:  The patient is a 41-year-old diabetic woman who developed a   cellulitis in her left leg.  She has an infected blister along the medial   ankle and some underlying fluctuance.  A CT scan did not show a deep abscess,   but clinically appears that there is at least some abscess formation.  I was   asked by Dr. Khan to oversee definitive orthopedic care.  I recommended   incision and drainage.  Risks were discussed with the patient.     DESCRIPTION OF PROCEDURE:  The patient was identified in the preoperative   holding area and her left leg was marked.  She was taken to the operating room   where she was left on her bed.  She was in the supine position.  General   anesthesia was administered.  The left lower extremity was prepped and draped   in sterile fashion.  She is already on intravenous antibiotics, so no   additional doses were given.  The left lower extremity was then prepped and   draped in sterile fashion.     I unroofed the blister.  There was small amount of purulence under this.  This   was swabbed and sent as a culture labeled superficial ankle.  There was   palpable fluctuance under this.  I made a longitudinal incision and   encountered additional pus.  This was swabbed and sent for culture, labeled   deep culture of the left ankle.  There was no exposed bone.  I irrigated the   abscess cavity until the irrigant was clean.  I closed the proximal aspect   with 3-0 nylon.  I left the distal  aspect open and packed with half-inch plain   packing material.  The length of this was approximately 6 inches.  Dressings   were applied.  The patient was then extubated and taken to the recovery room   in stable condition.     POSTOPERATIVE PLAN:  1.  Intravenous antibiotics and follow cultures.  2.  Start wound care tomorrow.  3.  Ongoing perioperative medical management per hospitalist.        ______________________________  MD LINDSEY PAYAN/SARAH    DD:  08/24/2021 17:33  DT:  08/24/2021 17:54    Job#:  [id number]

## 2021-08-25 NOTE — PROGRESS NOTES
Hospital Medicine Daily Progress Note    Date of Service  8/25/2021    Chief Complaint  Jc Ramos is a 41 y.o. female admitted 8/23/2021 with cellulitis and PID    Hospital Course  Patient is a 41-year-old female with history of diabetes and obesity who developed spontaneous onset left medial ankle swelling and pain.  She was also contacted by the urgent care and informed that her chlamydia and gonorrhea test were positive.  She says she was given a course of Bactrim.  In the ER white count was normal.  She was afebrile.  Pulse was 115.  Respiratory rate was normal.  She was started on antibiotics and admitted.    CT scan of the left lower extremity showed soft tissue gas just distal to the medial malleolus.  No abscess was identified.  Orthopedics was consulted and recommended incision and drainage.  She underwent surgical exploration where a deep abscess was found and copiously irrigated.  Cultures were sent.  The distal aspect of the incision was left open and packed with half-inch plain packing material.    Unfortunately, the patient is homeless and lives in a tent down by the [facility].  She is eager for discharge and she is concerned her belongings will be stolen.  Cultures are pending. WBC is normal. PCT normal. HIV is negative.    Interval Problem Update  8/25 tolerated I & D well yesterday.  Reports her pain is reduced.  Range of motion is increased.  Labs reviewed and benign.  Procalcitonin negative.  HIV negative.  Incision left open with packing due to deep abscess identified during surgery.  Will need daily wound care and outpatient wound care.  Unfortunately she is homeless but can stay with a friend at a motel until she recovers.  No culture results to date    8/24 Afebrile overnight  Patient in no acute distress  WBCs 3.7.  Vital signs stable on room air.  Normal respiratory rate.  Pulse did reach 93 today and with a WBC of 3.7 technically she has 2 criteria for sepsis.  However this is  mild this patient is hemodynamically stable.      Consultants/Specialty  orthopedics    Code Status  Full Code    Disposition  Patient is not medically cleared.   Anticipate discharge to to home with close outpatient follow-up.  I have placed the appropriate orders for post-discharge needs.    Review of Systems  Review of Systems   Constitutional: Negative for chills and fever.   Respiratory: Negative for cough and shortness of breath.    Cardiovascular: Negative for chest pain.   Gastrointestinal: Negative for blood in stool, melena, nausea and vomiting.   Genitourinary: Negative for dysuria and urgency.   Musculoskeletal: Positive for joint pain. Negative for myalgias.   Skin: Positive for rash.   All other systems reviewed and are negative.       Physical Exam  Temp:  [36 °C (96.8 °F)-36.4 °C (97.6 °F)] 36.4 °C (97.6 °F)  Pulse:  [72-88] 79  Resp:  [13-20] 18  BP: ()/(52-71) 109/67  SpO2:  [92 %-99 %] 92 %    Physical Exam  Vitals and nursing note reviewed.   Constitutional:       General: She is not in acute distress.  HENT:      Head: Normocephalic and atraumatic.      Nose: Nose normal.   Eyes:      Pupils: Pupils are equal, round, and reactive to light.   Cardiovascular:      Rate and Rhythm: Normal rate and regular rhythm.      Heart sounds: Normal heart sounds.   Pulmonary:      Effort: Pulmonary effort is normal.      Breath sounds: Normal breath sounds.   Abdominal:      General: There is no distension.      Palpations: Abdomen is soft.   Musculoskeletal:      Cervical back: Neck supple.      Left ankle: Swelling present.      Comments: Less erythema and rash relative to 8/24   Neurological:      Mental Status: She is alert and oriented to person, place, and time.   Psychiatric:         Mood and Affect: Mood normal.         Fluids    Intake/Output Summary (Last 24 hours) at 8/25/2021 0959  Last data filed at 8/24/2021 2003  Gross per 24 hour   Intake 620 ml   Output no documentation   Net 620 ml        Laboratory  Recent Labs     08/23/21  1705 08/24/21  0516 08/25/21  0444   WBC 6.6 3.7* 5.3   RBC 4.57 4.13* 4.26   HEMOGLOBIN 12.5 11.3* 12.0   HEMATOCRIT 39.3 35.7* 36.6*   MCV 86.0 86.4 85.9   MCH 27.4 27.4 28.2   MCHC 31.8* 31.7* 32.8*   RDW 42.9 43.3 41.8   PLATELETCT 171 146* 159*   MPV 11.2 11.7 11.5     Recent Labs     08/23/21  1705 08/24/21  0516 08/25/21  0444   SODIUM 134* 132* 134*   POTASSIUM 3.8 3.6 4.3   CHLORIDE 96 98 103   CO2 26 26 23   GLUCOSE 142* 122* 146*   BUN 5* 6* 8   CREATININE 0.58 0.53 0.43*   CALCIUM 9.5 8.4* 8.0*     Recent Labs     08/24/21  0516   INR 1.02               Imaging  CT-EXTREMITY, LOWER WITH LEFT   Final Result      1.  Soft tissue gas just distal to the medial malleolus. Assuming there is no open wound this finding is consistent with gas-forming infection      2.  No abscess      3.  Cellulitis      DX-FOOT-COMPLETE 3+ LEFT   Final Result         1.  No radiographic evidence of acute injury.      2. Soft tissue swelling and subcutaneous air noted in medial aspect of left ankle suspicious for subcutaneous infection or abscess.           Assessment/Plan  * Cellulitis- (present on admission)  Assessment & Plan  Abx continued as Ceftriax/doxy  Appears c/w small abrasion/route of entry with overgrown skin  Discussed w ID pharm  MRSA nasal screen neg  No abscess noted on CT; abscess found in I & D 8/24 8/25 No growth on cx yet      Pelvic inflammatory disease (PID)  Assessment & Plan  Cont doxy; ceftriax for cellulitis w abscess until cx dictate otherwise  RPR neg; HIV neg      Sexually transmitted disease  Assessment & Plan  -Continue with doxycycline x 14d course for PID    Hyponatremia- (present on admission)  Assessment & Plan  Mild; monitor    Type 2 diabetes mellitus with hyperglycemia, without long-term current use of insulin (HCC)- (present on admission)  Assessment & Plan  Not on medications at home  -A1c  -ISS  -8/25 SCr OK. Start metformin    Abnormal x-ray-  (present on admission)  Assessment & Plan  CT w soft tissue gas      Macrocytic anemia  Assessment & Plan  B12, TIBC, CBC w/o    Sepsis (HCC)  Assessment & Plan  This is Sepsis Present on admission  SIRS criteria identified on my evaluation include: Tachycardia, with heart rate greater than 90 BPM and Leukopenia, with WBC less than 4,000  Source is PID/Cellulitis  Sepsis protocol initiated  Fluid resuscitation ordered per protocol  IV antibiotics as appropriate for source of sepsis  While organ dysfunction may be noted elsewhere in this problem list or in the chart, degree of organ dysfunction does not meet CMS criteria for severe sepsis  RESOLVED    Smoker- (present on admission)  Assessment & Plan  Counseled re cessation on admit         VTE prophylaxis: heparin ppx    I have performed a physical exam and reviewed and updated ROS and Plan today (8/25/2021). In review of yesterday's note (8/24/2021), there are no changes except as documented above.

## 2021-08-25 NOTE — CARE PLAN
The patient is Stable - Low risk of patient condition declining or worsening    Shift Goals  Clinical Goals: pain control, possible d/c  Patient Goals: pain control  Family Goals: n/a    Progress made toward(s) clinical / shift goals:    Problem: Knowledge Deficit - Standard  Goal: Patient and family/care givers will demonstrate understanding of plan of care, disease process/condition, diagnostic tests and medications  Outcome: Progressing     Problem: Pain - Standard  Goal: Alleviation of pain or a reduction in pain to the patient’s comfort goal  Outcome: Progressing       Patient is not progressing towards the following goals:

## 2021-08-25 NOTE — THERAPY
Physical Therapy   Initial Evaluation     Patient Name: Jc Ramos  Age:  41 y.o., Sex:  female  Medical Record #: [id number]  Today's Date: 8/25/2021          Assessment  Patient is 41 y.o. female admitted w/ left ankle edema and pain.  S/p I&D.  Hx of diabetes, homeless.  No weight bearing precautions.  Reports she will d/c to her friends motel room.  She is rec'd alert, pleasant and willing to participate w/ therapy.  She is able to move in/out of bed w/o assist.  She is able to stand and ambulate 200 ft w/ a cane.  Cane given to pt.  Recommend some type of post op shoe, as pt has a bulky dressing that won't fit into her shoe, APN notified.  No acute PT needs.      Plan    Recommend Physical Therapy for Evaluation only   DC Equipment Recommendations: None  Discharge Recommendations: Anticipate that the patient will have no further physical therapy needs after discharge from the hospital         Objective       08/25/21 0758   Prior Living Situation   Housing / Facility Homeless   Prior Level of Functional Mobility   Bed Mobility Independent   Transfer Status Independent   Ambulation Independent   Assistive Devices Used None   Balance Assessment   Sitting Balance (Static) Fair +   Sitting Balance (Dynamic) Fair +   Standing Balance (Static) Fair +   Standing Balance (Dynamic) Fair +   Weight Shift Sitting Good   Weight Shift Standing Good   Gait Analysis   Gait Level Of Assist Supervised   Assistive Device Single Point Cane   Distance (Feet) 200   Weight Bearing Status   (no restrictions)   Bed Mobility    Supine to Sit Supervised   Sit to Supine Supervised   Scooting Supervised   Functional Mobility   Sit to Stand Supervised   Bed, Chair, Wheelchair Transfer Supervised   Anticipated Discharge Equipment and Recommendations   DC Equipment Recommendations None   Discharge Recommendations Anticipate that the patient will have no further physical therapy needs after discharge from the hospital

## 2021-08-25 NOTE — CARE PLAN
Problem: Knowledge Deficit - Standard  Goal: Patient and family/care givers will demonstrate understanding of plan of care, disease process/condition, diagnostic tests and medications  Outcome: Progressing     Problem: Pain - Standard  Goal: Alleviation of pain or a reduction in pain to the patient’s comfort goal  Outcome: Progressing   The patient is Stable - Low risk of patient condition declining or worsening    Shift Goals; pain control  Clinical Goals: pain control  Patient Goals: rest/pain control    Progress made toward(s) clinical / shift goals:  reports improved pain relief.    Patient is not progressing towards the following goals:

## 2021-08-25 NOTE — PROGRESS NOTES
Left ankle feels better  AVSS  Cx pending, gram stain = [facility]    Plan:    Daily dressing change  ABX  F/u 2 weeks

## 2021-08-26 ENCOUNTER — PATIENT OUTREACH (OUTPATIENT)
Dept: HEALTH INFORMATION MANAGEMENT | Facility: OTHER | Age: 41
End: 2021-08-26

## 2021-08-26 ENCOUNTER — PHARMACY VISIT (OUTPATIENT)
Dept: PHARMACY | Facility: MEDICAL CENTER | Age: 41
End: 2021-08-26
Payer: COMMERCIAL

## 2021-08-26 VITALS
RESPIRATION RATE: 17 BRPM | WEIGHT: 260.8 LBS | HEART RATE: 87 BPM | HEIGHT: 68 IN | OXYGEN SATURATION: 89 % | BODY MASS INDEX: 39.53 KG/M2 | DIASTOLIC BLOOD PRESSURE: 68 MMHG | SYSTOLIC BLOOD PRESSURE: 118 MMHG | TEMPERATURE: 97.6 F

## 2021-08-26 PROBLEM — A41.9 SEPSIS (HCC): Status: RESOLVED | Noted: 2021-08-24 | Resolved: 2021-08-26

## 2021-08-26 LAB
BACTERIA WND AEROBE CULT: ABNORMAL
BACTERIA WND AEROBE CULT: ABNORMAL
GRAM STN SPEC: ABNORMAL
SIGNIFICANT IND 70042: ABNORMAL
SITE SITE: ABNORMAL
SOURCE SOURCE: ABNORMAL

## 2021-08-26 PROCEDURE — 96376 TX/PRO/DX INJ SAME DRUG ADON: CPT

## 2021-08-26 PROCEDURE — G0378 HOSPITAL OBSERVATION PER HR: HCPCS

## 2021-08-26 PROCEDURE — 700102 HCHG RX REV CODE 250 W/ 637 OVERRIDE(OP): Performed by: NURSE PRACTITIONER

## 2021-08-26 PROCEDURE — A9270 NON-COVERED ITEM OR SERVICE: HCPCS | Performed by: NURSE PRACTITIONER

## 2021-08-26 PROCEDURE — 99217 PR OBSERVATION CARE DISCHARGE: CPT | Performed by: NURSE PRACTITIONER

## 2021-08-26 PROCEDURE — 700111 HCHG RX REV CODE 636 W/ 250 OVERRIDE (IP): Performed by: STUDENT IN AN ORGANIZED HEALTH CARE EDUCATION/TRAINING PROGRAM

## 2021-08-26 PROCEDURE — 700102 HCHG RX REV CODE 250 W/ 637 OVERRIDE(OP): Performed by: STUDENT IN AN ORGANIZED HEALTH CARE EDUCATION/TRAINING PROGRAM

## 2021-08-26 PROCEDURE — RXMED WILLOW AMBULATORY MEDICATION CHARGE: Performed by: NURSE PRACTITIONER

## 2021-08-26 PROCEDURE — A9270 NON-COVERED ITEM OR SERVICE: HCPCS | Performed by: STUDENT IN AN ORGANIZED HEALTH CARE EDUCATION/TRAINING PROGRAM

## 2021-08-26 RX ORDER — AMOXICILLIN AND CLAVULANATE POTASSIUM 875; 125 MG/1; MG/1
1 TABLET, FILM COATED ORAL EVERY 12 HOURS
Qty: 13 TABLET | Refills: 0 | Status: SHIPPED | OUTPATIENT
Start: 2021-08-26 | End: 2023-04-15

## 2021-08-26 RX ORDER — DOXYCYCLINE 100 MG/1
100 CAPSULE ORAL EVERY 12 HOURS
Qty: 22 CAPSULE | Refills: 0 | Status: SHIPPED | OUTPATIENT
Start: 2021-08-26 | End: 2021-09-06

## 2021-08-26 RX ORDER — AMOXICILLIN AND CLAVULANATE POTASSIUM 875; 125 MG/1; MG/1
1 TABLET, FILM COATED ORAL EVERY 12 HOURS
Status: DISCONTINUED | OUTPATIENT
Start: 2021-08-26 | End: 2021-08-26 | Stop reason: HOSPADM

## 2021-08-26 RX ORDER — OMEPRAZOLE 20 MG/1
20 CAPSULE, DELAYED RELEASE ORAL DAILY
Qty: 30 CAPSULE | Refills: 0 | Status: SHIPPED | OUTPATIENT
Start: 2021-08-27 | End: 2023-04-15

## 2021-08-26 RX ADMIN — METFORMIN HYDROCHLORIDE 500 MG: 500 TABLET ORAL at 11:15

## 2021-08-26 RX ADMIN — AMOXICILLIN AND CLAVULANATE POTASSIUM 1 TABLET: 875; 125 TABLET, FILM COATED ORAL at 11:16

## 2021-08-26 RX ADMIN — DOXYCYCLINE 100 MG: 100 TABLET, FILM COATED ORAL at 05:36

## 2021-08-26 RX ADMIN — OMEPRAZOLE 20 MG: 20 CAPSULE, DELAYED RELEASE ORAL at 05:36

## 2021-08-26 RX ADMIN — MORPHINE SULFATE 3 MG: 4 INJECTION INTRAVENOUS at 04:46

## 2021-08-26 ASSESSMENT — PAIN DESCRIPTION - PAIN TYPE: TYPE: ACUTE PAIN

## 2021-08-26 NOTE — CARE PLAN
The patient is Stable - Low risk of patient condition declining or worsening    Shift Goals  Clinical Goals: wound care  Patient Goals: discharge  Family Goals: n/a    Progress made toward(s) clinical / shift goals:    Problem: Knowledge Deficit - Standard  Goal: Patient and family/care givers will demonstrate understanding of plan of care, disease process/condition, diagnostic tests and medications  Outcome: Progressing     Problem: Pain - Standard  Goal: Alleviation of pain or a reduction in pain to the patient’s comfort goal  Outcome: Progressing     Problem: Skin Integrity  Goal: Skin integrity is maintained or improved  Outcome: Progressing       Patient is not progressing towards the following goals:

## 2021-08-26 NOTE — PROGRESS NOTES
Discharging patient home. Verbalized understanding of discharge instructions, follow up appointments, and home care. All questions answered.  Belongings with patient at time of discharge.  Vitals signs WNL. Pt given discharge information. Discharge assessment completed.     Teaching of wound change completed. Pt verbalized /demo  importance and understanding of daily dressing changes. Per pt she or a friend will complete dressing changes.

## 2021-08-26 NOTE — DISCHARGE PLANNING
Meds-to-Beds: Discharge prescription orders listed below delivered to patient's bedside. CAMACHO Sanchez notified. Patient counseled.      Current Outpatient Medications   Medication Sig Dispense Refill   • amoxicillin-clavulanate (AUGMENTIN) 875-125 MG Tab Take 1 Tablet by mouth every 12 hours. FIRST DOSE 8/26 6pm 13 Tablet 0   • doxycycline (MONODOX) 100 MG capsule Take 1 Capsule by mouth every 12 hours for 11 days. first dose 8/26/21 6pm 22 Capsule 0   • metFORMIN (GLUCOPHAGE) 500 MG Tab Take 1 Tablet by mouth 2 times a day with meals. 60 Tablet 2   • [START ON 8/27/2021] omeprazole (PRILOSEC) 20 MG delayed-release capsule Take 1 Capsule by mouth every day. 30 Capsule 0      Rasheeda Martinez, PharmD

## 2021-08-26 NOTE — DISCHARGE INSTRUCTIONS
Discharge Instructions    Discharged to home by car with self. Discharged via walking, hospital escort: Yes.  Special equipment needed: Not Applicable    Be sure to schedule a follow-up appointment with your primary care doctor or any specialists as instructed.     Discharge Plan:   Diet Plan: Discussed  Activity Level: Discussed  Confirmed Follow up Appointment: Appointment Scheduled  Confirmed Symptoms Management: Discussed  Medication Reconciliation Updated: Yes    I understand that a diet low in cholesterol, fat, and sodium is recommended for good health. Unless I have been given specific instructions below for another diet, I accept this instruction as my diet prescription.   Other diet: heart healthy     Special Instructions: None    · Is patient discharged on Warfarin / Coumadin?   No     Depression / Suicide Risk    As you are discharged from this [facility] facility, it is important to learn how to keep safe from harming yourself.    Recognize the warning signs:  · Abrupt changes in personality, positive or negative- including increase in energy   · Giving away possessions  · Change in eating patterns- significant weight changes-  positive or negative  · Change in sleeping patterns- unable to sleep or sleeping all the time   · Unwillingness or inability to communicate  · Depression  · Unusual sadness, discouragement and loneliness  · Talk of wanting to die  · Neglect of personal appearance   · Rebelliousness- reckless behavior  · Withdrawal from people/activities they love  · Confusion- inability to concentrate     If you or a loved one observes any of these behaviors or has concerns about self-harm, here's what you can do:  · Talk about it- your feelings and reasons for harming yourself  · Remove any means that you might use to hurt yourself (examples: pills, rope, extension cords, firearm)  · Get professional help from the community (Mental Health, Substance Abuse, psychological counseling)  · Do not  be alone:Call your Safe Contact- someone whom you trust who will be there for you.  · Call your local CRISIS HOTLINE 395-8565 or 545-544-0370  · Call your local Children's Mobile Crisis Response Team [place] (378) 439-7997 or www.CorkCRM  · Call the toll free National Suicide Prevention Hotlines   · National Suicide Prevention Lifeline 416-418-VICA (9202)  · [employer] Hope Line Network 800-SUICIDE (863-7811)        Wound Care, Adult  Taking care of your wound properly can help to prevent pain, infection, and scarring. It can also help your wound to heal more quickly.  How to care for your wound  Wound care         · Follow instructions from your health care provider about how to take care of your wound. Make sure you:  ? Wash your hands with soap and water before you change the bandage (dressing). If soap and water are not available, use hand .  ? Change your dressing as told by your health care provider.  ? Leave stitches (sutures), skin glue, or adhesive strips in place. These skin closures may need to stay in place for 2 weeks or longer. If adhesive strip edges start to loosen and curl up, you may trim the loose edges. Do not remove adhesive strips completely unless your health care provider tells you to do that.  · Check your wound area every day for signs of infection. Check for:  ? Redness, swelling, or pain.  ? Fluid or blood.  ? Warmth.  ? Pus or a bad smell.  · Ask your health care provider if you should clean the wound with mild soap and water. Doing this may include:  ? Using a clean towel to pat the wound dry after cleaning it. Do not rub or scrub the wound.  ? Applying a cream or ointment. Do this only as told by your health care provider.  ? Covering the incision with a clean dressing.  · Ask your health care provider when you can leave the wound uncovered.  · Keep the dressing dry until your health care provider says it can be removed. Do not take baths, swim, use a hot tub, or  do anything that would put the wound underwater until your health care provider approves. Ask your health care provider if you can take showers. You may only be allowed to take sponge baths.  Medicines    · If you were prescribed an antibiotic medicine, cream, or ointment, take or use the antibiotic as told by your health care provider. Do not stop taking or using the antibiotic even if your condition improves.  · Take over-the-counter and prescription medicines only as told by your health care provider. If you were prescribed pain medicine, take it 30 or more minutes before you do any wound care or as told by your health care provider.  General instructions  · Return to your normal activities as told by your health care provider. Ask your health care provider what activities are safe.  · Do not scratch or pick at the wound.  · Do not use any products that contain nicotine or tobacco, such as cigarettes and e-cigarettes. These may delay wound healing. If you need help quitting, ask your health care provider.  · Keep all follow-up visits as told by your health care provider. This is important.  · Eat a diet that includes protein, vitamin A, vitamin C, and other nutrient-rich foods to help the wound heal.  ? Foods rich in protein include meat, dairy, beans, nuts, and other sources.  ? Foods rich in vitamin A include carrots and dark green, leafy vegetables.  ? Foods rich in vitamin C include citrus, tomatoes, and other fruits and vegetables.  ? Nutrient-rich foods have protein, carbohydrates, fat, vitamins, or minerals. Eat a variety of healthy foods including vegetables, fruits, and whole grains.  Contact a health care provider if:  · You received a tetanus shot and you have swelling, severe pain, redness, or bleeding at the injection site.  · Your pain is not controlled with medicine.  · You have redness, swelling, or pain around the wound.  · You have fluid or blood coming from the wound.  · Your wound feels warm  to the touch.  · You have pus or a bad smell coming from the wound.  · You have a fever or chills.  · You are nauseous or you vomit.  · You are dizzy.  Get help right away if:  · You have a red streak going away from your wound.  · The edges of the wound open up and separate.  · Your wound is bleeding, and the bleeding does not stop with gentle pressure.  · You have a rash.  · You faint.  · You have trouble breathing.  Summary  · Always wash your hands with soap and water before changing your bandage (dressing).  · To help with healing, eat foods that are rich in protein, vitamin A, vitamin C, and other nutrients.  · Check your wound every day for signs of infection. Contact your health care provider if you suspect that your wound is infected.  This information is not intended to replace advice given to you by your health care provider. Make sure you discuss any questions you have with your health care provider.  Document Released: 09/26/2009 Document Revised: 04/06/2020 Document Reviewed: 07/04/2017  Elsevier Patient Education © 2020 [employer]      Wound Packing  Wound packing usually involves placing a moistened packing material into your wound and then covering it with an outer bandage (dressing). This helps promote proper healing of deep tissue and tissue under the skin. It also helps prevent bleeding, infection, and further injury.  Wounds are packed until deep tissues heal. The time it takes for this to occur is different for everyone. Your health care provider will show you how to pack and dress your wound. Using gloves and a clean technique is important in order to avoid spreading germs into your wound.  Supplies needed:  · Soap and water.  · Disposable gloves.  · Wetting solution.  · Clean bowl.  · Clean packing material (gauze or gauze sponges).  · Clean paper towels.  · Outer dressing.  · Tape.  · Cotton-tipped swabs.  · Small plastic bag.  How to pack your wound  Follow your health care provider's  instructions on how often you need to change dressings and pack your wound. You will likely be asked to change dressings 1-2 times a day.  Preparing the new packing material    2. Clean and disinfect your work surface or countertop.  3. Set a plastic bag on or near your work surface.  4. Wash your hands well with soap and water.  5. Put a clean paper towel on the counter.  6. Put a clean bowl on the towel. Be sure to only touch the outside of the bowl when handling it.  7. Pour wetting solution into the bowl.  8. Cut your packing material (gauze or sponges) to the right size for your wound. Drop it into the bowl.  9. Cut 4 tape strips that you will use to seal the outer dressing.  10. Put cotton-tipped swabs on the clean paper towel.  Removing the old packing material and dressing  2. Put on a set of gloves.  3. Gently remove the old dressing and packing material.  4. Remove your gloves.  5. Put the removed items, including the gloves, into the plastic bag to throw away later.  6. Wash your hands well with soap and water again.  Applying the new packing material and dressing  2. Put on a new set of gloves.  3. Squeeze the packing material in the bowl to release the extra liquid. The packing material should be moist, but not dripping wet.  4. Gently place the packing material into the wound. Use a cotton-tipped swab to guide it into place, filling all of the space.  5. Dry your gloved fingertips on the paper towel.  6. Open up your outer dressing supplies and put them on a dry part of the paper towel. Keep them from getting wet.  7. Place the outer dressing over the packed wound.  8. Tape the 4 outer edges of the outer dressing in place.  9. Remove your gloves.  10. Wash your hands again with soap and water.  11. Clean and disinfect your work surface or countertop.  General tips  · Follow your health care provider's instructions on how much to pack the wound. At first, you may need to pack it more tightly to help  stop bleeding. As the wound begins to heal inside, you will use less packing material and pack the wound loosely to allow tissue to heal slowly from the inside out.  · Keep the dressing clean and dry.  · Follow any other instructions given by your health care provider on how to aid healing. This may include applying warm or cold compresses, raising (elevating) the affected area, or wearing a compression dressing.  · Check your wound site every day for signs of infection. Check for:  ? More redness, swelling, or pain.  ? More fluid or blood.  ? Warmth.  ? Pus or a bad smell.  · Ask your health care provider about avoiding sun exposure and using sunscreen when the dressings are no longer needed.  · Keep all follow-up visits as told by your health care provider. This is important.  Contact a health care provider if:  · You have more drainage, redness, swelling, or pain at your wound site.  · You notice a bad smell coming from the wound site.  · Your wound site feels warm to the touch.  · Your wound becomes larger or deeper.  · Your wound changes in size or depth.  Get help right away if:  · Your pain is not controlled with pain medicine.  · The tissue inside your wound changes color from pink to white, yellow, or black.  · You have a fever.  · You have shaking chills.  · You are having trouble packing your wound.  Summary  · Wound packing usually involves placing a moistened packing material into your wound and then covering it with an outer bandage (dressing).  · Follow your health care provider's instructions on how often you need to change dressings and pack your wound. You will likely be asked to change dressings 1-2 times a day.  · When packing your wound, it is important to use gloves and a clean technique in order to avoid spreading germs into the wound.  · Check your wound site every day for signs of infection.  This information is not intended to replace advice given to you by your health care provider. Make  sure you discuss any questions you have with your health care provider.  Document Released: 07/15/2015 Document Revised: 01/24/2019 Document Reviewed: 01/24/2019  Elsevier Patient Education © 2020 [employer]vier Inc.

## 2021-08-26 NOTE — DISCHARGE PLANNING
Called and spoke with Melissa at wound care regarding wound appointment. Patient has Medicaid and will need PA. Melissa will have wound care auth department work on auth. Received call back from Melissa @ wound care and auth received. Appointment set for 08/30/2021 @ 1400 ans will schedule for once a week. Updated Vijay LOPEZ and Laura SAUER.

## 2021-08-26 NOTE — CARE PLAN
The patient is Stable - Low risk of patient condition declining or worsening    Shift Goals  Clinical Goals: wound care, IV antibiotics, pain management  Patient Goals: rest, pain and indigestion control  Family Goals: n/a    Progress made toward(s) clinical / shift goals:    Reports adequate pain management.  Resting.    Patient is not progressing towards the following goals:  +blood culture. IV antibiotics     Problem: Pain - Standard  Goal: Alleviation of pain or a reduction in pain to the patient’s comfort goal  Outcome: Progressing  Note: Reports adequate pain management. Resting and calm.      Problem: Skin Integrity  Goal: Skin integrity is maintained or improved  Outcome: Progressing  Note: LLE daily dressing change. IV antibiotics infusing.

## 2021-08-26 NOTE — DISCHARGE SUMMARY
Discharge Summary    CHIEF COMPLAINT ON ADMISSION  Chief Complaint   Patient presents with   • Foot Swelling     Pt left ankle redness and swelling for 2 days.  3+ edema on Left medial ankle.        Reason for Admission  Foot Pain/Swelling     Admission Date  8/23/2021    CODE STATUS  Full Code    HPI & HOSPITAL COURSE  Patient is a 41-year-old female with history of diabetes and obesity who developed spontaneous onset left medial ankle swelling and pain.  She was also contacted by the urgent care and informed that her chlamydia and gonorrhea test were positive.  She says she was given a course of Bactrim.  In the ER white count was normal.  She was afebrile.  Pulse was 115.  Respiratory rate was normal.  She was started on antibiotics and admitted.    CT scan of the left lower extremity showed soft tissue gas just distal to the medial malleolus.  No abscess was identified.  Orthopedics was consulted and recommended incision and drainage.  She underwent surgical exploration where a deep abscess was found and copiously irrigated.  Cultures were sent.  The distal aspect of the incision was left open and packed with half-inch plain packing material.    Unfortunately, the patient is homeless and lives in a tent down by the [facility].  She is eager for discharge and she is concerned her belongings will be stolen.  Cultures are growing Bacillus species. WBC is normal. PCT normal. HIV is negative.    Should be given a prescription for Augmentin and doxycycline to complete a 14-day course for PID and 10-day course for cellulitis.  She has been set up with the wound care clinic.  She has been given wound care packing supplies.  She plans to stay with a friend at a motel to keep her foot clean.  She has been given the contact information to orthopedics and [facility].  Medications have been delivered to the bedside.    Therefore, she is discharged in good and stable condition to home with close outpatient  follow-up.    The patient met 2-midnight criteria for an inpatient stay at the time of discharge.    Discharge Date  8/26/2021    FOLLOW UP ITEMS POST DISCHARGE  Wound care clinic follow-up  Orthopedics follow-up  Established with PCP    DISCHARGE DIAGNOSES  Principal Problem:    Cellulitis POA: Yes  Active Problems:    Abnormal x-ray POA: Yes    Type 2 diabetes mellitus with hyperglycemia, without long-term current use of insulin (HCC) POA: Yes    Hyponatremia POA: Yes    Sexually transmitted disease POA: Yes    Pelvic inflammatory disease (PID) POA: Yes    Smoker POA: Yes    Macrocytic anemia POA: Yes  Resolved Problems:    Sepsis (HCC) POA: Yes      FOLLOW UP  Future Appointments   Date Time Provider Department Center   8/30/2021  2:00 PM Felix Niño R.N. ND [address] [place] [id number] [phone]    Please call to schedule and establish with a primary provider. Thank you    Vega Douglas M.D.  [address] [facility] [address]  [phone]    In 2 weeks  For wound re-check, For suture removal      MEDICATIONS ON DISCHARGE     Medication List      Start taking these medications      Instructions   amoxicillin-clavulanate 875-125 MG Tabs  Commonly known as: AUGMENTIN   Take 1 Tablet by mouth every 12 hours. FIRST DOSE 8/26 6pm  Dose: 1 Tablet     doxycycline 100 MG capsule  Commonly known as: MONODOX   Take 1 Capsule by mouth every 12 hours for 11 days. first dose 8/26/21 6pm  Dose: 100 mg     metFORMIN 500 MG Tabs  Commonly known as: GLUCOPHAGE   Take 1 Tablet by mouth 2 times a day with meals.  Dose: 500 mg     omeprazole 20 MG delayed-release capsule  Start taking on: August 27, 2021  Commonly known as: PRILOSEC   Take 1 Capsule by mouth every day.  Dose: 20 mg            Allergies  Allergies   Allergen Reactions   • Vicodin [Hydrocodone-Acetaminophen] Vomiting       DIET  Orders Placed This Encounter   Procedures   • Diet Order Diet:  Consistent CHO (Diabetic)     Standing Status:   Standing     Number of Occurrences:   1     Order Specific Question:   Diet:     Answer:   Consistent CHO (Diabetic) [4]       ACTIVITY  As tolerated.  Weight bearing as tolerated    CONSULTATIONS  Orthopedics    PROCEDURES  Incision and drainage left ankle soft tissue; 8/24/2021; Dr. Vega Douglas    LABORATORY  Lab Results   Component Value Date    SODIUM 134 (L) 08/25/2021    POTASSIUM 4.3 08/25/2021    CHLORIDE 103 08/25/2021    CO2 23 08/25/2021    GLUCOSE 146 (H) 08/25/2021    BUN 8 08/25/2021    CREATININE 0.43 (L) 08/25/2021        Lab Results   Component Value Date    WBC 5.3 08/25/2021    HEMOGLOBIN 12.0 08/25/2021    HEMATOCRIT 36.6 (L) 08/25/2021    PLATELETCT 159 (L) 08/25/2021        Total time of the discharge process exceeds 40 minutes.

## 2021-08-26 NOTE — WOUND TEAM
Wound team by to see pt, pt is preparing for dishcarge.  RN did dressng change and instructed pt in daily dressings.  Pt confirmed that she or a friend will do the dressing changes.  Pt has a follow up appointment at the wound clinic.

## 2021-08-27 LAB
BACTERIA SPEC ANAEROBE CULT: NORMAL
BACTERIA WND AEROBE CULT: NORMAL
GRAM STN SPEC: NORMAL
SIGNIFICANT IND 70042: NORMAL
SIGNIFICANT IND 70042: NORMAL
SITE SITE: NORMAL
SITE SITE: NORMAL
SOURCE SOURCE: NORMAL
SOURCE SOURCE: NORMAL

## 2021-08-28 LAB
BACTERIA BLD CULT: NORMAL
SIGNIFICANT IND 70042: NORMAL
SITE SITE: NORMAL
SOURCE SOURCE: NORMAL

## 2021-08-29 LAB
BACTERIA BLD CULT: NORMAL
BACTERIA SPEC ANAEROBE CULT: NORMAL
SIGNIFICANT IND 70042: NORMAL
SIGNIFICANT IND 70042: NORMAL
SITE SITE: NORMAL
SITE SITE: NORMAL
SOURCE SOURCE: NORMAL
SOURCE SOURCE: NORMAL

## 2021-11-24 ENCOUNTER — HOSPITAL ENCOUNTER (EMERGENCY)
Facility: MEDICAL CENTER | Age: 41
End: 2021-11-24
Attending: EMERGENCY MEDICINE
Payer: MEDICAID

## 2021-11-24 VITALS
HEART RATE: 104 BPM | RESPIRATION RATE: 16 BRPM | WEIGHT: 250 LBS | TEMPERATURE: 97.3 F | HEIGHT: 68 IN | DIASTOLIC BLOOD PRESSURE: 96 MMHG | OXYGEN SATURATION: 96 % | SYSTOLIC BLOOD PRESSURE: 147 MMHG | BODY MASS INDEX: 37.89 KG/M2

## 2021-11-24 DIAGNOSIS — R10.2 PELVIC PAIN: ICD-10-CM

## 2021-11-24 DIAGNOSIS — Z20.2 STD EXPOSURE: ICD-10-CM

## 2021-11-24 DIAGNOSIS — N39.0 ACUTE URINARY TRACT INFECTION: ICD-10-CM

## 2021-11-24 DIAGNOSIS — N89.8 VAGINAL DISCHARGE: ICD-10-CM

## 2021-11-24 LAB
APPEARANCE UR: ABNORMAL
BACTERIA #/AREA URNS HPF: ABNORMAL /HPF
BILIRUB UR QL STRIP.AUTO: NEGATIVE
COLOR UR: YELLOW
EPI CELLS #/AREA URNS HPF: NEGATIVE /HPF
GLUCOSE UR STRIP.AUTO-MCNC: NEGATIVE MG/DL
HYALINE CASTS #/AREA URNS LPF: ABNORMAL /LPF
KETONES UR STRIP.AUTO-MCNC: NEGATIVE MG/DL
LEUKOCYTE ESTERASE UR QL STRIP.AUTO: ABNORMAL
MICRO URNS: ABNORMAL
NITRITE UR QL STRIP.AUTO: NEGATIVE
PH UR STRIP.AUTO: 7 [PH] (ref 5–8)
PROT UR QL STRIP: 30 MG/DL
RBC # URNS HPF: ABNORMAL /HPF
RBC UR QL AUTO: ABNORMAL
SP GR UR STRIP.AUTO: 1.02
UROBILINOGEN UR STRIP.AUTO-MCNC: 1 MG/DL
WBC #/AREA URNS HPF: ABNORMAL /HPF

## 2021-11-24 PROCEDURE — 700102 HCHG RX REV CODE 250 W/ 637 OVERRIDE(OP): Performed by: EMERGENCY MEDICINE

## 2021-11-24 PROCEDURE — 87077 CULTURE AEROBIC IDENTIFY: CPT

## 2021-11-24 PROCEDURE — 700111 HCHG RX REV CODE 636 W/ 250 OVERRIDE (IP): Performed by: EMERGENCY MEDICINE

## 2021-11-24 PROCEDURE — A9270 NON-COVERED ITEM OR SERVICE: HCPCS | Performed by: EMERGENCY MEDICINE

## 2021-11-24 PROCEDURE — 96372 THER/PROPH/DIAG INJ SC/IM: CPT

## 2021-11-24 PROCEDURE — 87491 CHLMYD TRACH DNA AMP PROBE: CPT

## 2021-11-24 PROCEDURE — 81001 URINALYSIS AUTO W/SCOPE: CPT

## 2021-11-24 PROCEDURE — 87510 GARDNER VAG DNA DIR PROBE: CPT

## 2021-11-24 PROCEDURE — 87660 TRICHOMONAS VAGIN DIR PROBE: CPT

## 2021-11-24 PROCEDURE — 87086 URINE CULTURE/COLONY COUNT: CPT

## 2021-11-24 PROCEDURE — 87480 CANDIDA DNA DIR PROBE: CPT

## 2021-11-24 PROCEDURE — 87591 N.GONORRHOEAE DNA AMP PROB: CPT

## 2021-11-24 PROCEDURE — 99284 EMERGENCY DEPT VISIT MOD MDM: CPT

## 2021-11-24 RX ORDER — CEFTRIAXONE 1 G/1
1000 INJECTION, POWDER, FOR SOLUTION INTRAMUSCULAR; INTRAVENOUS ONCE
Status: COMPLETED | OUTPATIENT
Start: 2021-11-24 | End: 2021-11-24

## 2021-11-24 RX ORDER — AZITHROMYCIN 250 MG/1
1000 TABLET, FILM COATED ORAL ONCE
Status: COMPLETED | OUTPATIENT
Start: 2021-11-24 | End: 2021-11-24

## 2021-11-24 RX ORDER — DOXYCYCLINE 100 MG/1
100 CAPSULE ORAL 2 TIMES DAILY
Qty: 20 CAPSULE | Refills: 0 | Status: SHIPPED | OUTPATIENT
Start: 2021-11-24 | End: 2023-04-15

## 2021-11-24 RX ORDER — IBUPROFEN 600 MG/1
600 TABLET ORAL ONCE
Status: COMPLETED | OUTPATIENT
Start: 2021-11-24 | End: 2021-11-24

## 2021-11-24 RX ORDER — DOXYCYCLINE 100 MG/1
100 TABLET ORAL ONCE
Status: COMPLETED | OUTPATIENT
Start: 2021-11-24 | End: 2021-11-24

## 2021-11-24 RX ORDER — CEPHALEXIN 500 MG/1
500 CAPSULE ORAL 4 TIMES DAILY
Qty: 40 CAPSULE | Refills: 0 | Status: SHIPPED | OUTPATIENT
Start: 2021-11-24 | End: 2023-04-15

## 2021-11-24 RX ORDER — IBUPROFEN 600 MG/1
600 TABLET ORAL EVERY 6 HOURS PRN
Qty: 30 TABLET | Refills: 0 | Status: SHIPPED | OUTPATIENT
Start: 2021-11-24 | End: 2023-04-15

## 2021-11-24 RX ADMIN — CEFTRIAXONE SODIUM 1000 MG: 1 INJECTION, POWDER, FOR SOLUTION INTRAMUSCULAR; INTRAVENOUS at 22:21

## 2021-11-24 RX ADMIN — DOXYCYCLINE 100 MG: 100 TABLET, FILM COATED ORAL at 22:21

## 2021-11-24 RX ADMIN — AZITHROMYCIN 1000 MG: 250 TABLET, FILM COATED ORAL at 22:21

## 2021-11-24 RX ADMIN — IBUPROFEN 600 MG: 600 TABLET, FILM COATED ORAL at 23:15

## 2021-11-24 ASSESSMENT — FIBROSIS 4 INDEX: FIB4 SCORE: 1.03

## 2021-11-24 NOTE — LETTER
11/27/2021               Araleida Ramos  [address] [facility] [address]        Dear Jc (MR#[id number])    As we have been unable to contact you by phone, this letter is sent in regards to your, recent visit to the [facility] Emergency Department on 11/24/2021. During the visit, tests were performed to assist the physician in your medical diagnosis. A review of your tests requires that we notify you of the following:    Your culture test was POSITIVE for Gonorrhea and Chlamydia, a sexually transmitted infection. This was treated appropriately in the Emergency Department and with the antibiotics prescribed for you (doxycycline) on discharge. You may stop taking the doxycycline after 7 days as 7 days is sufficient to treat your infection. Your urine culture was also POSITIVE for staphylococcus saprophyticus. The antibiotic prescribed for you (cephalexin) should be active to treat this bacteria. It is important that you continue taking your antibiotic until it is finished.       Based on the above findings it is recommended that you seek testing for the presence of additional sexually transmitted infections, hepatitis, and HIV from the Health Department. Also, it is advised that you inform your sexual partner(s) within the previous 60 days of the above findings and direct them to the Health Department for testing, and to abstain from sexual intercourse seven days after the completion of antibiotic treatment. Should your symptoms progress, it is important that you follow up with your primary care physician, your local urgent care office, or return to the emergency department for further work up in order to prevent long term health issues.      Thank you for your cooperation in the matter.    Sincerely,  ED Culture Follow-Up Staff  Priya Payne, PharmD, BCPS  PGY2 Infectious Diseases Pharmacy Resident      [facility] Emergency Department  [address] [place] [id number] [phone] (Priya's Phone  Number)  [phone] ( Culture Line)

## 2021-11-25 LAB
C TRACH DNA SPEC QL NAA+PROBE: POSITIVE
CANDIDA DNA VAG QL PROBE+SIG AMP: NEGATIVE
G VAGINALIS DNA VAG QL PROBE+SIG AMP: NEGATIVE
N GONORRHOEA DNA SPEC QL NAA+PROBE: POSITIVE
SPECIMEN SOURCE: ABNORMAL
T VAGINALIS DNA VAG QL PROBE+SIG AMP: NEGATIVE

## 2021-11-25 NOTE — ED TRIAGE NOTES
Jc Ramos  41 y.o.  Chief Complaint   Patient presents with   • Exposure to STD   • Pelvic Pain     Patient ambulatory to triage for above. Reports sx x3 days. She states had unprotected sex with someone who has a hx of Chlamydia/gonorrhea recently. Also reports vaginal discharge with foul odor. Denies fevers.     Triage process explained to patient, apologized for wait time, and returned to [facility].  Pt informed to notify staff of any change in condition. NAD at this time.

## 2021-11-25 NOTE — ED NOTES
Patient ambulated from [facility] to [facility] independently with steady gait.    Patient provided urine sample and sample sent.    Patient changed into gown, chart up for ERP.

## 2021-11-25 NOTE — ED NOTES
Discharge education provided. Discharge paperwork signed by pt. Prescription to be picked up by pt. All questions answered. All belongings with pt. Pt ambulated to lobby unassisted with steady gait.

## 2021-11-25 NOTE — ED PROVIDER NOTES
ED Provider Note     Scribed for Catrina Schmitz D.O. by Nader Wells. 11/24/2021, 9:33 PM.     Primary care provider: Pcp Pt States None  Means of arrival: walk in         History obtained from: patient  History limited by: none    CHIEF COMPLAINT  Chief Complaint   Patient presents with   • Exposure to STD   • Pelvic Pain       HPI  Jc Ramos is a 41 y.o. female who presents to the emergency Department with pelvic pain for the past 3 days. The patient recently had unprotected sex with her partner who has an STD. She reports associated vaginal discharge with foul-smelling odor. She also reports discomfort with urination but no burning. No fever, constipation, or diarrhea. Her LMP was 3 days ago.    REVIEW OF SYSTEMS  Pertinent positives include pelvic pain, STD exposure, vaginal discharge, foul-smelling odor, dysuria. Pertinent negatives include no burning with urination, fever, constipation, diarrhea.   See HPI for further details. All other systems are negative.    PAST MEDICAL HISTORY  Past Medical History:   Diagnosis Date   • Diabetes        FAMILY HISTORY  Family History   Problem Relation Age of Onset   • Diabetes Mother        SOCIAL HISTORY  Social History     Tobacco Use   • Smoking status: Current Every Day Smoker     Packs/day: 0.50     Types: Cigarettes   • Smokeless tobacco: Never Used   • Tobacco comment: 1/2 ppd since age 16   Vaping Use   • Vaping Use: Never used   Substance Use Topics   • Alcohol use: No   • Drug use: No      Social History     Substance and Sexual Activity   Drug Use No       SURGICAL HISTORY  Past Surgical History:   Procedure Laterality Date   • IRRIGATION & DEBRIDEMENT ORTHO Left 8/24/2021    Procedure: IRRIGATION AND DEBRIDEMENT ANKLE;  Surgeon: Vega Douglas M.D.;  Location: SURGERY [facility];  Service: Orthopedics   • CHOLECYSTECTOMY     • PB C-SEC ONLY,PBEV C-SEC         CURRENT MEDICATIONS  Current Outpatient Medications:   •  amoxicillin-clavulanate  "(AUGMENTIN) 875-125 MG Tab, Take 1 Tablet by mouth every 12 hours. FIRST DOSE 8/26 6pm, Disp: 13 Tablet, Rfl: 0  •  metFORMIN (GLUCOPHAGE) 500 MG Tab, Take 1 Tablet by mouth 2 times a day with meals., Disp: 60 Tablet, Rfl: 2  •  omeprazole (PRILOSEC) 20 MG delayed-release capsule, Take 1 Capsule by mouth every day., Disp: 30 Capsule, Rfl: 0    ALLERGIES  Allergies   Allergen Reactions   • Vicodin [Hydrocodone-Acetaminophen] Vomiting       PHYSICAL EXAM  VITAL SIGNS: /100   Pulse (!) 110   Temp 36.4 °C (97.5 °F) (Temporal)   Resp 16   Ht 1.727 m (5' 8\")   Wt 113 kg (250 lb)   LMP 11/17/2021   SpO2 100%   BMI 38.01 kg/m²     Constitutional: Patient is well developed, well nourished. Non-toxic appearing. No acute distress.   HENT: Normocephalic, atraumatic.  Oral mucosa moist  Eyes: PERRL, EOMI, Conjunctiva without erythema or exudates.  Lymphatic: No lymphadenopathy noted.   Cardiovascular: Normal heart rate and Regular rhythm. No murmur  Thorax & Lungs: Clear and equal breath sounds with good excursion. No respiratory distress, no rhonchi, wheezing or rales.  Abdomen: Bowel sounds normal in all four quadrants. Soft,nontender, no rebound, guarding, palpable masses.   Skin: Warm, Dry  Back: No CVA tenderness.  Genitalia: Copious amount of very odorous yellow-green discharge, no adnexal masses , no cervical motion tenderness.  Extremities: Peripheral pulses 4/4 No edema  Musculoskeletal: Normal range of motion in all major joints. No tenderness to palpation or major deformities noted.   Neurologic: Alert & oriented x 3, Normal motor function, Normal sensory function  Psychiatric: Affect normal, Judgment normal, Mood normal.     DIAGNOSTICS/PROCEDURES    LABS  Results for orders placed or performed during the hospital encounter of 11/24/21   URINALYSIS CULTURE, IF INDICATED    Specimen: Urine, Clean Catch   Result Value Ref Range    Color Yellow     Character Cloudy (A)     Specific Gravity 1.020 <1.035    " Ph 7.0 5.0 - 8.0    Glucose Negative Negative mg/dL    Ketones Negative Negative mg/dL    Protein 30 (A) Negative mg/dL    Bilirubin Negative Negative    Urobilinogen, Urine 1.0 Negative    Nitrite Negative Negative    Leukocyte Esterase Large (A) Negative    Occult Blood Small (A) Negative    Micro Urine Req Microscopic    Chlamydia/GC PCR Urine Or Swab    Specimen: Genital   Result Value Ref Range    Source Vaginal    URINE MICROSCOPIC (W/UA)   Result Value Ref Range    WBC Packed (A) /hpf    RBC 20-50 (A) /hpf    Bacteria Moderate (A) None /hpf    Epithelial Cells Negative /hpf    Hyaline Cast 0-2 /lpf   URINE CULTURE(NEW)    Specimen: Urine   Result Value Ref Range    Significant Indicator NEG     Source UR     Site -     Culture Result -    VAGINAL PATHOGENS DNA PANEL   Result Value Ref Range    Candida species DNA Probe Negative Negative    Trichamonas vaginalis DNA Probe Negative Negative    Gardnerella vaginalis DNA Probe Negative Negative       Labs reviewed by me    COURSE & MEDICAL DECISION MAKING  Pertinent Labs & Imaging studies reviewed. (See chart for details)    9:33 PM - Patient seen and evaluated at bedside; pelvic exam performed with female nurse chaperone. Ordered for chlamydia/GC PCR, wet prep, UA culture if indicated to evaluate. Patient will be treated with Rocephin 1 g, Zithromax 1 g, Adoxa 100 mg for her symptoms. Differential diagnoses include, but are not limited to, bacterial vaginosis, chlamydia, gonorrhea, UTI.  Patient was treated for potential STDs including chlamydia and gonorrhea.  She does have a urinary tract infection and was treated with Rocephin for that.  She will be sent home with prescriptions for Keflex and doxycycline.  She is to use condoms in the future, increase fluids, cranberry juice, follow-up with [facility] or [facility] clinics she is stable upon discharge.        FINAL IMPRESSION  1. STD exposure    2. Pelvic pain    3. Acute urinary tract infection     4. Vaginal discharge         Nader REYES (Scribe), am scribing for, and in the presence of, Catrina Schmitz D.O..    Electronically signed by: Nader Wells (Yordy), 11/24/2021    Catrina REYES D.O. personally performed the services described in this documentation, as scribed by Nader Wells in my presence, and it is both accurate and complete.    The note accurately reflects work and decisions made by me.  Catrina Schmitz D.O.  11/25/2021  1:09 AM

## 2021-11-25 NOTE — DISCHARGE INSTRUCTIONS
Follow-up with [facility] next week for recheck  Use condoms in the future.  Increase fluids especially water and water based products, cranberry juice for the next several days  Take the medications as directed with food until gone.

## 2021-11-27 LAB
BACTERIA UR CULT: ABNORMAL
BACTERIA UR CULT: ABNORMAL
SIGNIFICANT IND 70042: ABNORMAL
SITE SITE: ABNORMAL
SOURCE SOURCE: ABNORMAL

## 2021-11-27 NOTE — ED NOTES
"ED Positive Culture Follow-up/Notification Note:    Date: 11/27/2021     Patient seen in the ED on 11/24/2021 for pelvic pain. Patient reports associated vaginal discharge and discomfort with urination.     1. STD exposure    2. Pelvic pain    3. Acute urinary tract infection    4. Vaginal discharge       Medications received in the ER          Discharge Medication List as of 11/24/2021 11:19 PM      START taking these medications    Details   cephALEXin (KEFLEX) 500 MG Cap Take 1 Capsule by mouth 4 times a day., Disp-40 Capsule, R-0, Normal      doxycycline (MONODOX) 100 MG capsule Take 1 Capsule by mouth 2 times a day. With food, Disp-20 Capsule, R-0, Normal      ibuprofen (MOTRIN) 600 MG Tab Take 1 Tablet by mouth every 6 hours as needed for Moderate Pain. With food, Disp-30 Tablet, R-0, Normal             Allergies: Vicodin [hydrocodone-acetaminophen]     Vitals:    11/24/21 1935 11/24/21 1940 11/24/21 2308   BP: 160/100  147/96   Pulse: (!) 110  (!) 104   Resp: 16  16   Temp: 36.4 °C (97.5 °F)  36.3 °C (97.3 °F)   TempSrc: Temporal  Temporal   SpO2: 100%  96%   Weight:  113 kg (250 lb)    Height: 1.727 m (5' 8\") 1.727 m (5' 8\")        Final cultures:   Results     Procedure Component Value Units Date/Time    URINE CULTURE(NEW) [[id number]]  (Abnormal) Collected: 11/24/21 2200    Order Status: Completed Specimen: Urine Updated: 11/27/21 0918     Significant Indicator POS     Source UR     Site -     Culture Result -      Staphylococcus saprophyticus  >100,000 cfu/mL      Narrative:      Indication for culture:->Patient WITHOUT an indwelling Tate  catheter in place with new onset of Dysuria, Frequency,  Urgency, and/or Suprapubic pain    Chlamydia/GC PCR Urine Or Swab [[id number]]  (Abnormal) Collected: 11/24/21 2200    Order Status: Completed Specimen: Genital Updated: 11/25/21 1857     C. trachomatis by PCR POSITIVE     N. gonorrhoeae by PCR POSITIVE     Source Vaginal    Narrative:      Indication for " culture:->Patient WITHOUT an indwelling Tate  catheter in place with new onset of Dysuria, Frequency,  Urgency, and/or Suprapubic pain    URINALYSIS CULTURE, IF INDICATED [[id number]]  (Abnormal) Collected: 11/24/21 2129    Order Status: Completed Specimen: Urine, Clean Catch Updated: 11/24/21 2218     Color Yellow     Character Cloudy     Specific Gravity 1.020     Ph 7.0     Glucose Negative mg/dL      Ketones Negative mg/dL      Protein 30 mg/dL      Bilirubin Negative     Urobilinogen, Urine 1.0     Nitrite Negative     Leukocyte Esterase Large     Occult Blood Small     Micro Urine Req Microscopic    Narrative:      Indication for culture:->Patient WITHOUT an indwelling Tate  catheter in place with new onset of Dysuria, Frequency,  Urgency, and/or Suprapubic pain    WET PREP [[id number]] Collected: 11/24/21 0000    Order Status: Canceled Specimen: Vaginal           Plan:   Patient treated for gonorrhea and chlamydia in the ER. No additional treatment necessary. Called patient to discuss culture results, phone number listed unfortunately disconnected. Will send a letter to notify her of results and to  the patient to abstain from sexual intercourse 7 days after antibiotic therapy is completed, to notify any sexual partners within the last 60 days to go the the Health Department for testing, to seek further HIV/STD testing, and to seek medical attention if symptoms persist.     Will change doxycycline to 7 days of therapy instead of 10 days for treatment of chlamydia. Patient also grew staph saprophyticus in urine, which should be covered by cephalexin. Will also notify patient in the letter and to finish antibiotics as prescribed.     Priya Payne, PharmD, BCPS   PGY2 Infectious Diseases Pharmacy Resident

## 2023-01-18 ENCOUNTER — HOSPITAL ENCOUNTER (EMERGENCY)
Facility: MEDICAL CENTER | Age: 43
End: 2023-01-19
Attending: EMERGENCY MEDICINE
Payer: MEDICAID

## 2023-01-18 VITALS
TEMPERATURE: 98.4 F | WEIGHT: 269.4 LBS | HEART RATE: 98 BPM | BODY MASS INDEX: 40.83 KG/M2 | HEIGHT: 68 IN | SYSTOLIC BLOOD PRESSURE: 144 MMHG | DIASTOLIC BLOOD PRESSURE: 69 MMHG | OXYGEN SATURATION: 97 % | RESPIRATION RATE: 18 BRPM

## 2023-01-18 DIAGNOSIS — J21.0 RSV (ACUTE BRONCHIOLITIS DUE TO RESPIRATORY SYNCYTIAL VIRUS): ICD-10-CM

## 2023-01-18 DIAGNOSIS — R73.9 HYPERGLYCEMIA: ICD-10-CM

## 2023-01-18 DIAGNOSIS — N39.0 ACUTE UTI: ICD-10-CM

## 2023-01-18 LAB
ALBUMIN SERPL BCP-MCNC: 3.9 G/DL (ref 3.2–4.9)
ALBUMIN/GLOB SERPL: 1.2 G/DL
ALP SERPL-CCNC: 96 U/L (ref 30–99)
ALT SERPL-CCNC: 18 U/L (ref 2–50)
ANION GAP SERPL CALC-SCNC: 9 MMOL/L (ref 7–16)
AST SERPL-CCNC: 16 U/L (ref 12–45)
B-OH-BUTYR SERPL-MCNC: 0.06 MMOL/L (ref 0.02–0.27)
BASOPHILS # BLD AUTO: 0.4 % (ref 0–1.8)
BASOPHILS # BLD: 0.03 K/UL (ref 0–0.12)
BILIRUB SERPL-MCNC: 0.3 MG/DL (ref 0.1–1.5)
BUN SERPL-MCNC: 7 MG/DL (ref 8–22)
CALCIUM ALBUM COR SERPL-MCNC: 9.3 MG/DL (ref 8.5–10.5)
CALCIUM SERPL-MCNC: 9.2 MG/DL (ref 8.5–10.5)
CHLORIDE SERPL-SCNC: 99 MMOL/L (ref 96–112)
CO2 SERPL-SCNC: 26 MMOL/L (ref 20–33)
CREAT SERPL-MCNC: 0.67 MG/DL (ref 0.5–1.4)
EOSINOPHIL # BLD AUTO: 0.07 K/UL (ref 0–0.51)
EOSINOPHIL NFR BLD: 0.9 % (ref 0–6.9)
ERYTHROCYTE [DISTWIDTH] IN BLOOD BY AUTOMATED COUNT: 41.7 FL (ref 35.9–50)
EST. AVERAGE GLUCOSE BLD GHB EST-MCNC: 174 MG/DL
FLUAV RNA SPEC QL NAA+PROBE: NEGATIVE
FLUBV RNA SPEC QL NAA+PROBE: NEGATIVE
GFR SERPLBLD CREATININE-BSD FMLA CKD-EPI: 112 ML/MIN/1.73 M 2
GLOBULIN SER CALC-MCNC: 3.2 G/DL (ref 1.9–3.5)
GLUCOSE SERPL-MCNC: 181 MG/DL (ref 65–99)
HBA1C MFR BLD: 7.7 % (ref 4–5.6)
HCG SERPL QL: NEGATIVE
HCT VFR BLD AUTO: 39.1 % (ref 37–47)
HGB BLD-MCNC: 12.2 G/DL (ref 12–16)
IMM GRANULOCYTES # BLD AUTO: 0.04 K/UL (ref 0–0.11)
IMM GRANULOCYTES NFR BLD AUTO: 0.5 % (ref 0–0.9)
LYMPHOCYTES # BLD AUTO: 1.11 K/UL (ref 1–4.8)
LYMPHOCYTES NFR BLD: 14.4 % (ref 22–41)
MAGNESIUM SERPL-MCNC: 1.2 MG/DL (ref 1.5–2.5)
MCH RBC QN AUTO: 27.2 PG (ref 27–33)
MCHC RBC AUTO-ENTMCNC: 31.2 G/DL (ref 33.6–35)
MCV RBC AUTO: 87.1 FL (ref 81.4–97.8)
MONOCYTES # BLD AUTO: 0.79 K/UL (ref 0–0.85)
MONOCYTES NFR BLD AUTO: 10.2 % (ref 0–13.4)
NEUTROPHILS # BLD AUTO: 5.69 K/UL (ref 2–7.15)
NEUTROPHILS NFR BLD: 73.6 % (ref 44–72)
NRBC # BLD AUTO: 0 K/UL
NRBC BLD-RTO: 0 /100 WBC
PHOSPHATE SERPL-MCNC: 2.7 MG/DL (ref 2.5–4.5)
PLATELET # BLD AUTO: 215 K/UL (ref 164–446)
PMV BLD AUTO: 11.3 FL (ref 9–12.9)
POTASSIUM SERPL-SCNC: 3.8 MMOL/L (ref 3.6–5.5)
PROT SERPL-MCNC: 7.1 G/DL (ref 6–8.2)
RBC # BLD AUTO: 4.49 M/UL (ref 4.2–5.4)
RSV RNA SPEC QL NAA+PROBE: POSITIVE
SARS-COV-2 RNA RESP QL NAA+PROBE: NOTDETECTED
SODIUM SERPL-SCNC: 134 MMOL/L (ref 135–145)
SPECIMEN SOURCE: ABNORMAL
WBC # BLD AUTO: 7.7 K/UL (ref 4.8–10.8)

## 2023-01-18 PROCEDURE — 700101 HCHG RX REV CODE 250

## 2023-01-18 PROCEDURE — 96374 THER/PROPH/DIAG INJ IV PUSH: CPT

## 2023-01-18 PROCEDURE — 85025 COMPLETE CBC W/AUTO DIFF WBC: CPT

## 2023-01-18 PROCEDURE — C9803 HOPD COVID-19 SPEC COLLECT: HCPCS | Performed by: EMERGENCY MEDICINE

## 2023-01-18 PROCEDURE — 36415 COLL VENOUS BLD VENIPUNCTURE: CPT

## 2023-01-18 PROCEDURE — 0241U HCHG SARS-COV-2 COVID-19 NFCT DS RESP RNA 4 TRGT MIC: CPT

## 2023-01-18 PROCEDURE — 83735 ASSAY OF MAGNESIUM: CPT

## 2023-01-18 PROCEDURE — 80053 COMPREHEN METABOLIC PANEL: CPT

## 2023-01-18 PROCEDURE — 83036 HEMOGLOBIN GLYCOSYLATED A1C: CPT

## 2023-01-18 PROCEDURE — 84703 CHORIONIC GONADOTROPIN ASSAY: CPT

## 2023-01-18 PROCEDURE — 99284 EMERGENCY DEPT VISIT MOD MDM: CPT

## 2023-01-18 PROCEDURE — 94640 AIRWAY INHALATION TREATMENT: CPT

## 2023-01-18 PROCEDURE — 700105 HCHG RX REV CODE 258: Performed by: EMERGENCY MEDICINE

## 2023-01-18 PROCEDURE — 700101 HCHG RX REV CODE 250: Performed by: EMERGENCY MEDICINE

## 2023-01-18 PROCEDURE — 700111 HCHG RX REV CODE 636 W/ 250 OVERRIDE (IP): Performed by: EMERGENCY MEDICINE

## 2023-01-18 PROCEDURE — 84100 ASSAY OF PHOSPHORUS: CPT

## 2023-01-18 PROCEDURE — 82010 KETONE BODYS QUAN: CPT

## 2023-01-18 RX ORDER — CEFTRIAXONE 2 G/1
2000 INJECTION, POWDER, FOR SOLUTION INTRAMUSCULAR; INTRAVENOUS ONCE
Status: COMPLETED | OUTPATIENT
Start: 2023-01-18 | End: 2023-01-18

## 2023-01-18 RX ORDER — SODIUM CHLORIDE 9 MG/ML
1000 INJECTION, SOLUTION INTRAVENOUS ONCE
Status: COMPLETED | OUTPATIENT
Start: 2023-01-18 | End: 2023-01-18

## 2023-01-18 RX ORDER — NITROFURANTOIN 25; 75 MG/1; MG/1
100 CAPSULE ORAL 2 TIMES DAILY
Qty: 14 CAPSULE | Refills: 0 | Status: ACTIVE | OUTPATIENT
Start: 2023-01-18 | End: 2023-01-25

## 2023-01-18 RX ADMIN — ALBUTEROL SULFATE 2.5 MG: 2.5 SOLUTION RESPIRATORY (INHALATION) at 21:28

## 2023-01-18 RX ADMIN — LIDOCAINE HYDROCHLORIDE 4.2 ML: 10 INJECTION, SOLUTION INFILTRATION; PERINEURAL at 23:30

## 2023-01-18 RX ADMIN — CEFTRIAXONE SODIUM 2000 MG: 2 INJECTION, POWDER, FOR SOLUTION INTRAMUSCULAR; INTRAVENOUS at 23:29

## 2023-01-18 RX ADMIN — SODIUM CHLORIDE 1000 ML: 9 INJECTION, SOLUTION INTRAVENOUS at 21:14

## 2023-01-18 ASSESSMENT — FIBROSIS 4 INDEX: FIB4 SCORE: 1.06

## 2023-01-19 NOTE — ED TRIAGE NOTES
Chief Complaint   Patient presents with    Flu Like Symptoms     Cough, headache, sore throat X couple weeks.      Pt ambulatory to triage for the following complaints. Slightly tachy to 106. Otherwise VSS, in NAD. Hx of smoking.

## 2023-01-19 NOTE — ED PROVIDER NOTES
ED Provider Note    CHIEF COMPLAINT  Chief Complaint   Patient presents with    Flu Like Symptoms     Cough, headache, sore throat X couple weeks.        EXTERNAL RECORDS REVIEWED  Inpatient Notes ed and discharge notes reviewed    HPI/ROS  LIMITATION TO HISTORY   Select: : None  OUTSIDE HISTORIAN(S):  none    Jc Ramos is a 42 y.o. female who presents to the emergency department stating that she has felt generally unwell for the last 2 weeks.  Past medical history significant for diabetes.  Previously on metformin.  No longer compliant.  Does not check her blood sugars.  Continues to smoke 1/2-3/4 of a pack of cigarettes per day.  No known sick contacts to include family that she lives with.  Due to persistence of symptoms she decided to come to the emergency department at this point as she felt that it would be appropriate to get viral testing.  Denies any abdominal pain.  No nausea or vomiting.  No diarrhea constipation.  No urinary symptoms.  Denies pregnancy.    PAST MEDICAL HISTORY   has a past medical history of Diabetes.    SURGICAL HISTORY   has a past surgical history that includes cholecystectomy; c-sec only,prev c-sec; and irrigation & debridement ortho (Left, 8/24/2021).    FAMILY HISTORY  Family History   Problem Relation Age of Onset    Diabetes Mother        SOCIAL HISTORY  Social History     Tobacco Use    Smoking status: Every Day     Packs/day: 0.50     Types: Cigarettes    Smokeless tobacco: Never    Tobacco comments:     1/2 ppd since age 16   Vaping Use    Vaping Use: Never used   Substance and Sexual Activity    Alcohol use: No    Drug use: No    Sexual activity: Not on file       CURRENT MEDICATIONS  Home Medications       Reviewed by Jeannie Montanez R.N. (Registered Nurse) on 01/18/23 at 1930  Med List Status: Not Addressed     Medication Last Dose Status   amoxicillin-clavulanate (AUGMENTIN) 875-125 MG Tab  Active   cephALEXin (KEFLEX) 500 MG Cap  Active   doxycycline (MONODOX) 100  "MG capsule  Active   ibuprofen (MOTRIN) 600 MG Tab  Active   metFORMIN (GLUCOPHAGE) 500 MG Tab  Active   omeprazole (PRILOSEC) 20 MG delayed-release capsule  Active                    ALLERGIES  Allergies   Allergen Reactions    Vicodin [Hydrocodone-Acetaminophen] Vomiting       PHYSICAL EXAM  VITAL SIGNS: BP (!) 144/69   Pulse 98   Temp 36.9 °C (98.4 °F) (Temporal)   Resp 18   Ht 1.727 m (5' 8\")   Wt 122 kg (269 lb 6.4 oz)   LMP 12/27/2022 (Approximate)   SpO2 97%   BMI 40.96 kg/m²        PHYSICAL EXAM  VITAL SIGNS: BP (!) 144/69   Pulse 98   Temp 36.9 °C (98.4 °F) (Temporal)   Resp 18   Ht 1.727 m (5' 8\")   Wt 122 kg (269 lb 6.4 oz)   LMP 12/27/2022 (Approximate)   SpO2 97%   BMI 40.96 kg/m²  @MICHELET[[id number]::@   Pulse ox interpretation: I interpret this pulse ox as normal.  Constitutional: Alert in no apparent distress.  HENT: No signs of trauma, Bilateral external ears normal, Nose normal.   Eyes: Pupils are equal and reactive  Neck: Normal range of motion, No tenderness, Supple  Cardiovascular: Regular rate and rhythm, no murmurs.   Thorax & Lungs: End expiratory wheezes, no respiratory distress, No chest tenderness.   Abdomen: Bowel sounds normal, Soft, No tenderness  Skin: Warm, Dry, No erythema, No rash.   Extremities: Intact distal pulses  Musculoskeletal: Good range of motion in all major joints. No tenderness to palpation or major deformities noted.   Neurologic: Alert , Normal motor function, Normal sensory function, No focal deficits noted.   Psychiatric: Affect normal, Judgment normal, Mood normal.         DIAGNOSTIC STUDIES / PROCEDURES      LABS  Results for orders placed or performed during the hospital encounter of 01/18/23   HCG Qual Serum   Result Value Ref Range    Beta-Hcg Qualitative Serum Negative Negative   CBC w/ Differential   Result Value Ref Range    WBC 7.7 4.8 - 10.8 K/uL    RBC 4.49 4.20 - 5.40 M/uL    Hemoglobin 12.2 12.0 - 16.0 g/dL    Hematocrit 39.1 37.0 - 47.0 %    " MCV 87.1 81.4 - 97.8 fL    MCH 27.2 27.0 - 33.0 pg    MCHC 31.2 (L) 33.6 - 35.0 g/dL    RDW 41.7 35.9 - 50.0 fL    Platelet Count 215 164 - 446 K/uL    MPV 11.3 9.0 - 12.9 fL    Neutrophils-Polys 73.60 (H) 44.00 - 72.00 %    Lymphocytes 14.40 (L) 22.00 - 41.00 %    Monocytes 10.20 0.00 - 13.40 %    Eosinophils 0.90 0.00 - 6.90 %    Basophils 0.40 0.00 - 1.80 %    Immature Granulocytes 0.50 0.00 - 0.90 %    Nucleated RBC 0.00 /100 WBC    Neutrophils (Absolute) 5.69 2.00 - 7.15 K/uL    Lymphs (Absolute) 1.11 1.00 - 4.80 K/uL    Monos (Absolute) 0.79 0.00 - 0.85 K/uL    Eos (Absolute) 0.07 0.00 - 0.51 K/uL    Baso (Absolute) 0.03 0.00 - 0.12 K/uL    Immature Granulocytes (abs) 0.04 0.00 - 0.11 K/uL    NRBC (Absolute) 0.00 K/uL   Complete Metabolic Panel (CMP)   Result Value Ref Range    Sodium 134 (L) 135 - 145 mmol/L    Potassium 3.8 3.6 - 5.5 mmol/L    Chloride 99 96 - 112 mmol/L    Co2 26 20 - 33 mmol/L    Anion Gap 9.0 7.0 - 16.0    Glucose 181 (H) 65 - 99 mg/dL    Bun 7 (L) 8 - 22 mg/dL    Creatinine 0.67 0.50 - 1.40 mg/dL    Calcium 9.2 8.5 - 10.5 mg/dL    AST(SGOT) 16 12 - 45 U/L    ALT(SGPT) 18 2 - 50 U/L    Alkaline Phosphatase 96 30 - 99 U/L    Total Bilirubin 0.3 0.1 - 1.5 mg/dL    Albumin 3.9 3.2 - 4.9 g/dL    Total Protein 7.1 6.0 - 8.2 g/dL    Globulin 3.2 1.9 - 3.5 g/dL    A-G Ratio 1.2 g/dL   MAGNESIUM   Result Value Ref Range    Magnesium 1.2 (L) 1.5 - 2.5 mg/dL   PHOSPHORUS   Result Value Ref Range    Phosphorus 2.7 2.5 - 4.5 mg/dL   BETA-HYDROXYBUTYRIC ACID   Result Value Ref Range    beta-Hydroxybutyric Acid 0.06 0.02 - 0.27 mmol/L   HEMOGLOBIN A1C   Result Value Ref Range    Glycohemoglobin 7.7 (H) 4.0 - 5.6 %    Est Avg Glucose 174 mg/dL   COV-2, FLU A/B, AND RSV BY PCR (2-4 HOURS CEPHEID): Collect NP swab in VTM    Specimen: Nasopharyngeal; Respirate   Result Value Ref Range    Influenza virus A RNA Negative Negative    Influenza virus B, PCR Negative Negative    RSV, PCR POSITIVE (A) Negative     SARS-CoV-2 by PCR NotDetected     SARS-CoV-2 Source NP Swab    ESTIMATED GFR   Result Value Ref Range    GFR (CKD-EPI) 112 >60 mL/min/1.73 m 2   CORRECTED CALCIUM   Result Value Ref Range    Correct Calcium 9.3 8.5 - 10.5 mg/dL         RADIOLOGY  I have independently interpreted the diagnostic imaging associated with this visit and am waiting the final reading from the radiologist.   My preliminary interpretation is a follows:   No orders to display         COURSE & MEDICAL DECISION MAKING    ED Observation Status? No; Patient does not meet criteria for ED Observation.     INITIAL ASSESSMENT AND PLAN  Care Narrative: Patient presented emerged department with the above symptoms.  Given her vague pathology concern for viral syndrome additional infectious etiologies will be explored given the constellation of her symptoms.    ADDITIONAL PROBLEM LIST AND DISPOSITION  42-year-old female presenting to the emergency department with the above presentation.  Now feeling unwell for weeks.  Today's findings primarily include that of RSV which is most fitting with her symptoms but additionally finding of acute urinary tract infection.  No findings of pyelonephritis.  This point patient is tolerating p.o.'s.  She is symptomatically improved with medications as provided.  Will discharge home on antiemetics and antibiotics.  She will continue with conservative home care for the viral syndrome.  Understanding return precautions and she has been provided with referral to outpatient PCP.        Given the fact that she does have history of diabetes which is currently being treated I have strongly recommend that she continue with glycemic monitoring and control especially with setting up new PCP stated above.    Escalation of care considered, and ultimately not performed:IV fluids, blood analysis, diagnostic imaging, and acute inpatient care management, however at this time, the patient is most appropriate for outpatient  management    Barriers to care at this time, including but not limited to: Patient does not have established PCP.     Decision tools and prescription drugs considered including, but not limited to: Antibiotics provided, Antivirals not appropriate for RSV, and Pain Medications tolerating pain and can use over-the-counter medications .    HYDRATION: Based on the patient's presentation of Abnormal Fluid Loss, Acute Vomiting, Dehydration, and Hyperglycemia the patient was given IV fluids. IV Hydration was used because oral hydration was not adequate alone. Upon recheck following hydration, the patient was feeling better.        FINAL DIAGNOSIS  1. RSV (acute bronchiolitis due to respiratory syncytial virus)    2. Acute UTI    3. Hyperglycemia               Electronically signed by: Andrzej Westbrook M.D., 1/18/2023 8:32 PM

## 2023-01-19 NOTE — ED NOTES
Pt aox4, vss, nad, ambulatory steady, all belongings with pt  Pt understood all dc info and when to seek medical care, no further questions

## 2023-04-13 ENCOUNTER — HOSPITAL ENCOUNTER (EMERGENCY)
Facility: MEDICAL CENTER | Age: 43
End: 2023-04-13
Attending: STUDENT IN AN ORGANIZED HEALTH CARE EDUCATION/TRAINING PROGRAM
Payer: MEDICAID

## 2023-04-13 ENCOUNTER — APPOINTMENT (OUTPATIENT)
Dept: RADIOLOGY | Facility: MEDICAL CENTER | Age: 43
End: 2023-04-13
Attending: STUDENT IN AN ORGANIZED HEALTH CARE EDUCATION/TRAINING PROGRAM
Payer: MEDICAID

## 2023-04-13 VITALS
BODY MASS INDEX: 37.89 KG/M2 | RESPIRATION RATE: 18 BRPM | HEIGHT: 68 IN | TEMPERATURE: 96.8 F | OXYGEN SATURATION: 93 % | WEIGHT: 250 LBS | HEART RATE: 85 BPM | SYSTOLIC BLOOD PRESSURE: 124 MMHG | DIASTOLIC BLOOD PRESSURE: 65 MMHG

## 2023-04-13 DIAGNOSIS — R07.1 CHEST PAIN ON BREATHING: ICD-10-CM

## 2023-04-13 DIAGNOSIS — E11.65 TYPE 2 DIABETES MELLITUS WITH HYPERGLYCEMIA, WITHOUT LONG-TERM CURRENT USE OF INSULIN (HCC): ICD-10-CM

## 2023-04-13 DIAGNOSIS — J18.9 COMMUNITY ACQUIRED PNEUMONIA OF RIGHT LOWER LOBE OF LUNG: ICD-10-CM

## 2023-04-13 LAB
ALBUMIN SERPL BCP-MCNC: 3.4 G/DL (ref 3.2–4.9)
ALBUMIN/GLOB SERPL: 0.9 G/DL
ALP SERPL-CCNC: 97 U/L (ref 30–99)
ALT SERPL-CCNC: 11 U/L (ref 2–50)
ANION GAP SERPL CALC-SCNC: 12 MMOL/L (ref 7–16)
AST SERPL-CCNC: 6 U/L (ref 12–45)
BASOPHILS # BLD AUTO: 0.3 % (ref 0–1.8)
BASOPHILS # BLD: 0.03 K/UL (ref 0–0.12)
BILIRUB SERPL-MCNC: 0.4 MG/DL (ref 0.1–1.5)
BUN SERPL-MCNC: 9 MG/DL (ref 8–22)
CALCIUM ALBUM COR SERPL-MCNC: 9.6 MG/DL (ref 8.5–10.5)
CALCIUM SERPL-MCNC: 9.1 MG/DL (ref 8.5–10.5)
CHLORIDE SERPL-SCNC: 98 MMOL/L (ref 96–112)
CO2 SERPL-SCNC: 22 MMOL/L (ref 20–33)
CREAT SERPL-MCNC: 0.63 MG/DL (ref 0.5–1.4)
D DIMER PPP IA.FEU-MCNC: 1.18 UG/ML (FEU) (ref 0–0.5)
EKG IMPRESSION: NORMAL
EOSINOPHIL # BLD AUTO: 0.06 K/UL (ref 0–0.51)
EOSINOPHIL NFR BLD: 0.7 % (ref 0–6.9)
ERYTHROCYTE [DISTWIDTH] IN BLOOD BY AUTOMATED COUNT: 43.4 FL (ref 35.9–50)
GFR SERPLBLD CREATININE-BSD FMLA CKD-EPI: 113 ML/MIN/1.73 M 2
GLOBULIN SER CALC-MCNC: 3.6 G/DL (ref 1.9–3.5)
GLUCOSE SERPL-MCNC: 356 MG/DL (ref 65–99)
HCG SERPL QL: NEGATIVE
HCT VFR BLD AUTO: 33.9 % (ref 37–47)
HGB BLD-MCNC: 10.5 G/DL (ref 12–16)
IMM GRANULOCYTES # BLD AUTO: 0.04 K/UL (ref 0–0.11)
IMM GRANULOCYTES NFR BLD AUTO: 0.4 % (ref 0–0.9)
LYMPHOCYTES # BLD AUTO: 0.96 K/UL (ref 1–4.8)
LYMPHOCYTES NFR BLD: 10.7 % (ref 22–41)
MCH RBC QN AUTO: 25.6 PG (ref 27–33)
MCHC RBC AUTO-ENTMCNC: 31 G/DL (ref 33.6–35)
MCV RBC AUTO: 82.7 FL (ref 81.4–97.8)
MONOCYTES # BLD AUTO: 0.73 K/UL (ref 0–0.85)
MONOCYTES NFR BLD AUTO: 8.1 % (ref 0–13.4)
NEUTROPHILS # BLD AUTO: 7.16 K/UL (ref 2–7.15)
NEUTROPHILS NFR BLD: 79.8 % (ref 44–72)
NRBC # BLD AUTO: 0 K/UL
NRBC BLD-RTO: 0 /100 WBC
PLATELET # BLD AUTO: 234 K/UL (ref 164–446)
PMV BLD AUTO: 10.8 FL (ref 9–12.9)
POTASSIUM SERPL-SCNC: 3.9 MMOL/L (ref 3.6–5.5)
PROT SERPL-MCNC: 7 G/DL (ref 6–8.2)
RBC # BLD AUTO: 4.1 M/UL (ref 4.2–5.4)
SODIUM SERPL-SCNC: 132 MMOL/L (ref 135–145)
TROPONIN T SERPL-MCNC: 7 NG/L (ref 6–19)
TROPONIN T SERPL-MCNC: <6 NG/L (ref 6–19)
WBC # BLD AUTO: 9 K/UL (ref 4.8–10.8)

## 2023-04-13 PROCEDURE — 80053 COMPREHEN METABOLIC PANEL: CPT

## 2023-04-13 PROCEDURE — 700117 HCHG RX CONTRAST REV CODE 255: Performed by: STUDENT IN AN ORGANIZED HEALTH CARE EDUCATION/TRAINING PROGRAM

## 2023-04-13 PROCEDURE — A9270 NON-COVERED ITEM OR SERVICE: HCPCS | Performed by: STUDENT IN AN ORGANIZED HEALTH CARE EDUCATION/TRAINING PROGRAM

## 2023-04-13 PROCEDURE — 71045 X-RAY EXAM CHEST 1 VIEW: CPT

## 2023-04-13 PROCEDURE — 84703 CHORIONIC GONADOTROPIN ASSAY: CPT

## 2023-04-13 PROCEDURE — 700102 HCHG RX REV CODE 250 W/ 637 OVERRIDE(OP): Performed by: STUDENT IN AN ORGANIZED HEALTH CARE EDUCATION/TRAINING PROGRAM

## 2023-04-13 PROCEDURE — 96374 THER/PROPH/DIAG INJ IV PUSH: CPT | Mod: XU

## 2023-04-13 PROCEDURE — 85379 FIBRIN DEGRADATION QUANT: CPT

## 2023-04-13 PROCEDURE — 84484 ASSAY OF TROPONIN QUANT: CPT

## 2023-04-13 PROCEDURE — 85025 COMPLETE CBC W/AUTO DIFF WBC: CPT

## 2023-04-13 PROCEDURE — 93005 ELECTROCARDIOGRAM TRACING: CPT

## 2023-04-13 PROCEDURE — 36415 COLL VENOUS BLD VENIPUNCTURE: CPT

## 2023-04-13 PROCEDURE — 700105 HCHG RX REV CODE 258: Performed by: STUDENT IN AN ORGANIZED HEALTH CARE EDUCATION/TRAINING PROGRAM

## 2023-04-13 PROCEDURE — 99285 EMERGENCY DEPT VISIT HI MDM: CPT

## 2023-04-13 PROCEDURE — 700111 HCHG RX REV CODE 636 W/ 250 OVERRIDE (IP): Performed by: STUDENT IN AN ORGANIZED HEALTH CARE EDUCATION/TRAINING PROGRAM

## 2023-04-13 PROCEDURE — 93005 ELECTROCARDIOGRAM TRACING: CPT | Performed by: STUDENT IN AN ORGANIZED HEALTH CARE EDUCATION/TRAINING PROGRAM

## 2023-04-13 PROCEDURE — 71275 CT ANGIOGRAPHY CHEST: CPT

## 2023-04-13 RX ORDER — AMOXICILLIN 500 MG/1
1000 CAPSULE ORAL ONCE
Status: COMPLETED | OUTPATIENT
Start: 2023-04-13 | End: 2023-04-13

## 2023-04-13 RX ORDER — AZITHROMYCIN 250 MG/1
TABLET, FILM COATED ORAL
Qty: 6 TABLET | Refills: 0 | Status: ON HOLD | OUTPATIENT
Start: 2023-04-13 | End: 2023-04-21

## 2023-04-13 RX ORDER — AMOXICILLIN 500 MG/1
1000 CAPSULE ORAL 3 TIMES DAILY
Qty: 42 CAPSULE | Refills: 0 | Status: ON HOLD | OUTPATIENT
Start: 2023-04-13 | End: 2023-04-21

## 2023-04-13 RX ORDER — KETOROLAC TROMETHAMINE 30 MG/ML
15 INJECTION, SOLUTION INTRAMUSCULAR; INTRAVENOUS ONCE
Status: COMPLETED | OUTPATIENT
Start: 2023-04-13 | End: 2023-04-13

## 2023-04-13 RX ORDER — SODIUM CHLORIDE 9 MG/ML
1000 INJECTION, SOLUTION INTRAVENOUS ONCE
Status: COMPLETED | OUTPATIENT
Start: 2023-04-13 | End: 2023-04-13

## 2023-04-13 RX ORDER — ACETAMINOPHEN 325 MG/1
650 TABLET ORAL ONCE
Status: COMPLETED | OUTPATIENT
Start: 2023-04-13 | End: 2023-04-13

## 2023-04-13 RX ORDER — AZITHROMYCIN 250 MG/1
500 TABLET, FILM COATED ORAL ONCE
Status: COMPLETED | OUTPATIENT
Start: 2023-04-13 | End: 2023-04-13

## 2023-04-13 RX ADMIN — AZITHROMYCIN MONOHYDRATE 500 MG: 250 TABLET ORAL at 05:37

## 2023-04-13 RX ADMIN — IOHEXOL 50 ML: 350 INJECTION, SOLUTION INTRAVENOUS at 03:20

## 2023-04-13 RX ADMIN — AMOXICILLIN 1000 MG: 500 CAPSULE ORAL at 05:37

## 2023-04-13 RX ADMIN — KETOROLAC TROMETHAMINE 15 MG: 30 INJECTION, SOLUTION INTRAMUSCULAR at 01:22

## 2023-04-13 RX ADMIN — ACETAMINOPHEN 650 MG: 325 TABLET ORAL at 01:22

## 2023-04-13 RX ADMIN — SODIUM CHLORIDE 1000 ML: 9 INJECTION, SOLUTION INTRAVENOUS at 01:22

## 2023-04-13 ASSESSMENT — PAIN DESCRIPTION - PAIN TYPE: TYPE: ACUTE PAIN

## 2023-04-13 ASSESSMENT — FIBROSIS 4 INDEX: FIB4 SCORE: 0.74

## 2023-04-13 NOTE — ED TRIAGE NOTES
"Chief Complaint   Patient presents with    Shortness of Breath     X 2 months with productive cough and green sputum     Chest Pain     X 1 week, central CP that is worse in inspiration and coughing    Flank Pain     Right flank pain that radiates to chest. Denies N/V and dysuria.        Pt BIB EMS to triage for above complaints. [facility] for EMS, pt has hx of DM2 and is non-complaint with all medications. Protocols ordered.      Pt placed in lobby. Pt educated on triage process. Pt encouraged to alert staff for any changes.     Patient and staff wearing appropriate PPE    BP (!) 149/97   Pulse (!) 116   Temp 36.4 °C (97.5 °F) (Temporal)   Resp 18   Ht 1.727 m (5' 8\")   Wt 113 kg (250 lb)   SpO2 93% Comment: Room air  BMI 38.01 kg/m²     "

## 2023-04-13 NOTE — ED NOTES
Pt medicated as ordered. Given discharge instructions regarding prescriptions, follow up appointments and reasons to return to the ER. Pt verbalized understanding and signed AVS

## 2023-04-13 NOTE — ED PROVIDER NOTES
ED Provider Note    CHIEF COMPLAINT  Chief Complaint   Patient presents with    Shortness of Breath     X 2 months with productive cough and green sputum     Chest Pain     X 1 week, central CP that is worse in inspiration and coughing    Flank Pain     Right flank pain that radiates to chest. Denies N/V and dysuria.        EXTERNAL RECORDS REVIEWED  Other patient was seen in the ER on 18 January found to have RSV, UTI and hyperglycemia.    HPI/ROS  LIMITATION TO HISTORY   Select: : None  OUTSIDE HISTORIAN(S):  None    Jc Ramos is a 42 y.o. female who presents with chest pain.  She says that the onset was about a week ago and has been persistent and worsening since then.  She describes it as right-sided that radiates from her right shoulder down to her right from side.  She denies any actual flank pain however.  She describes as a sharp, spasm pain that is worse with inspiration and coughing.  She says that she has been coughing for months now she had RSV and COVID and has intermittent green sputum.  She denies any diaphoresis, nausea or vomiting.  She denies any abdominal pain.  She has no dysuria, hematuria or urgency.  Patient reports history of diabetes and is noncompliant with her medications as she 'ran out'.    Patient denies any recent prolonged periods of immobilization (including hospitalization, long plane flight or car ride), no recent surgery, no recent traumatic injury, hemoptysis, estrogen supplementation or history of malignancy, DVT, PE.  Denies unilateral leg swelling.     She does smoke tobacco.    PAST MEDICAL HISTORY   has a past medical history of Diabetes.    SURGICAL HISTORY   has a past surgical history that includes cholecystectomy; c-sec only,prev c-sec; and irrigation & debridement ortho (Left, 8/24/2021).    FAMILY HISTORY  Family History   Problem Relation Age of Onset    Diabetes Mother        SOCIAL HISTORY  Social History     Tobacco Use    Smoking status: Every Day      "Packs/day: 0.50     Types: Cigarettes    Smokeless tobacco: Never    Tobacco comments:     1/2 ppd since age 16   Vaping Use    Vaping Use: Never used   Substance and Sexual Activity    Alcohol use: No    Drug use: No    Sexual activity: Not on file       CURRENT MEDICATIONS  Home Medications       Reviewed by Nurys Lopez R.N. (Registered Nurse) on 04/13/23 at 0040  Med List Status: Not Addressed     Medication Last Dose Status   amoxicillin-clavulanate (AUGMENTIN) 875-125 MG Tab  Active   cephALEXin (KEFLEX) 500 MG Cap  Active   doxycycline (MONODOX) 100 MG capsule  Active   ibuprofen (MOTRIN) 600 MG Tab  Active   metFORMIN (GLUCOPHAGE) 500 MG Tab  Active   omeprazole (PRILOSEC) 20 MG delayed-release capsule  Active                    ALLERGIES  Allergies   Allergen Reactions    Vicodin [Hydrocodone-Acetaminophen] Vomiting       PHYSICAL EXAM  VITAL SIGNS: /65   Pulse 85   Temp 36 °C (96.8 °F) (Oral)   Resp 18   Ht 1.727 m (5' 8\")   Wt 113 kg (250 lb)   SpO2 93%   BMI 38.01 kg/m²    Constitutional: Awake and alert . Non toxic  HENT: Normal inspection  . Dry mucous membranes  Eyes: Normal inspection  Neck: Grossly normal range of motion.  Cardiovascular: Tachycardic heart rate, Normal rhythm.  Symmetric peripheral pulses.   Thorax & Lungs: No respiratory distress, No wheezing, No rales, No rhonchi, + chest tenderness. Diminished to RLL  Abdomen: Soft, non-distended, nontender to palpation in all 4 quadrants, no mass  Skin: No obvious rash.  Extremities: Warm, well perfused. No clubbing, cyanosis, edema   Neurologic: Grossly normal   Psychiatric: Normal for situation      DIAGNOSTIC STUDIES / PROCEDURES  EKG  I have independently interpreted this EKG  Tachycardic rate, normal rhythm. Intervals within normal limits. No ST wave elevations or depressions.  My interpretation is sinus tachycardia      LABS  Results for orders placed or performed during the hospital encounter of 04/13/23   CBC with " Differential   Result Value Ref Range    WBC 9.0 4.8 - 10.8 K/uL    RBC 4.10 (L) 4.20 - 5.40 M/uL    Hemoglobin 10.5 (L) 12.0 - 16.0 g/dL    Hematocrit 33.9 (L) 37.0 - 47.0 %    MCV 82.7 81.4 - 97.8 fL    MCH 25.6 (L) 27.0 - 33.0 pg    MCHC 31.0 (L) 33.6 - 35.0 g/dL    RDW 43.4 35.9 - 50.0 fL    Platelet Count 234 164 - 446 K/uL    MPV 10.8 9.0 - 12.9 fL    Neutrophils-Polys 79.80 (H) 44.00 - 72.00 %    Lymphocytes 10.70 (L) 22.00 - 41.00 %    Monocytes 8.10 0.00 - 13.40 %    Eosinophils 0.70 0.00 - 6.90 %    Basophils 0.30 0.00 - 1.80 %    Immature Granulocytes 0.40 0.00 - 0.90 %    Nucleated RBC 0.00 /100 WBC    Neutrophils (Absolute) 7.16 (H) 2.00 - 7.15 K/uL    Lymphs (Absolute) 0.96 (L) 1.00 - 4.80 K/uL    Monos (Absolute) 0.73 0.00 - 0.85 K/uL    Eos (Absolute) 0.06 0.00 - 0.51 K/uL    Baso (Absolute) 0.03 0.00 - 0.12 K/uL    Immature Granulocytes (abs) 0.04 0.00 - 0.11 K/uL    NRBC (Absolute) 0.00 K/uL   Comp Metabolic Panel   Result Value Ref Range    Sodium 132 (L) 135 - 145 mmol/L    Potassium 3.9 3.6 - 5.5 mmol/L    Chloride 98 96 - 112 mmol/L    Co2 22 20 - 33 mmol/L    Anion Gap 12.0 7.0 - 16.0    Glucose 356 (H) 65 - 99 mg/dL    Bun 9 8 - 22 mg/dL    Creatinine 0.63 0.50 - 1.40 mg/dL    Calcium 9.1 8.5 - 10.5 mg/dL    AST(SGOT) 6 (L) 12 - 45 U/L    ALT(SGPT) 11 2 - 50 U/L    Alkaline Phosphatase 97 30 - 99 U/L    Total Bilirubin 0.4 0.1 - 1.5 mg/dL    Albumin 3.4 3.2 - 4.9 g/dL    Total Protein 7.0 6.0 - 8.2 g/dL    Globulin 3.6 (H) 1.9 - 3.5 g/dL    A-G Ratio 0.9 g/dL   Troponins NOW   Result Value Ref Range    Troponin T 7 6 - 19 ng/L   Troponins in two (2) hours   Result Value Ref Range    Troponin T <6 6 - 19 ng/L   HCG Qual Serum   Result Value Ref Range    Beta-Hcg Qualitative Serum Negative Negative   D-DIMER   Result Value Ref Range    D-Dimer 1.18 (H) 0.00 - 0.50 ug/mL (FEU)   ESTIMATED GFR   Result Value Ref Range    GFR (CKD-EPI) 113 >60 mL/min/1.73 m 2   CORRECTED CALCIUM   Result Value Ref  Range    Correct Calcium 9.6 8.5 - 10.5 mg/dL   EKG (NOW)   Result Value Ref Range    Report       [facility] Emergency Dept.    Test Date:  2023  Pt Name:    CARRIE WEBSTER               Department: ER  MRN:        [id number]                      Room:  Gender:     Female                       Technician: [id number]  :        1980                   Requested By:ER TRIAGE PROTOCOL  Order #:    [id number]                    Reading MD:    Measurements  Intervals                                Axis  Rate:       112                          P:          67  OH:         118                          QRS:        83  QRSD:       96                           T:          31  QT:         324  QTc:        443    Interpretive Statements  Sinus tachycardia  RSR' in V1 or V2, right VCD or RVH  No previous ECG available for comparison           RADIOLOGY  I have independently interpreted the diagnostic imaging associated with this visit and am waiting the final reading from the radiologist.   My preliminary interpretation is as follows: CXR with RLL infiltrate  Radiologist interpretation:   CT-CTA CHEST PULMONARY ARTERY W/ RECONS   Final Result         1.  No pulmonary embolus appreciated.   2.  Right middle and lower lobe consolidations compatible with atelectasis and or infiltrates.   3.  Small layering right pleural effusion   4.  Hepatomegaly and irregular hepatic contour favoring changes of cirrhosis      DX-CHEST-PORTABLE (1 VIEW)   Final Result         1.  Right lower lobe infiltrate.   2.  Trace right pleural effusion            COURSE & MEDICAL DECISION MAKING    ED Observation Status? Yes; I am placing the patient in to an observation status due to a diagnostic uncertainty as well as therapeutic intensity. Patient placed in observation status at 1:09 AM, 2023.     Observation plan is as follows: IV, Labs, Trop x 2, CTA    Upon Reevaluation, the patient's condition has: Improved; and will be  discharged.    Patient discharged from ED Observation status at 5.19AM 4/13/2023    INITIAL ASSESSMENT, COURSE AND PLAN  Care Narrative: Patient is a 42 y.o. female, who presents to the Emergency Department with chest pain.     Differential diagnoses include, but not limited to: ACS; chest pain of noncardiac origin; CHF; pneumothorax; pulmonary edema; pleural effusion; arrhythmia; pneumonia; pulmonary embolus; aortic/thoracic dissection; costochondritis; pericarditis; myocarditis; and GERD/PUD.    Patient arrived hemodynamically stable, afebrile, and in good condition. Patient was placed on telemetry. Physical examination detailed above and without any clinical signs of CHF. Labs reviewed above and notable for negative troponin.  ECG without T wave inversions, ST elevations, ST depressions, new LBBB or other obvious signs of ischemia; or signs of pericarditis.  Repeat troponin negative.  X-ray with likely right lower lobe infiltrate.    She has no actual flank pain (rather pain radiates from her chest to her flank) and she has no dysuria, urgency or hematuria.  Urine without infection or hematuria and I doubt pyelonephritis or obstructed kidney stone.    I believe this patient can be ruled out for acute coronary syndrome because during today's visit has had two high-sensitivity troponin and a non-ischemic ECG. Troponin has been obtained in excess of 3 hours after the onset of patient's pain. Patient also low risk with a Heart Score of 1.   ECG without findings to suggest pericarditis.  Myocarditis unlikely given negative troponin . D dimer elevated but CTA without evidence of pulmonary embolism.  Aortic/thoracic dissection less likely as patient has been hemodynamically stable, denies any ripping or significant back pain, had no widened mediastinum on CXR, and on exam with equal pulses and no neurological deficits.     CT does again show signs to suggest an infiltrate on the right lower lobe.  This certainly would  explain her coughing and chest discomfort.  I started her on amoxicillin and azithromycin and first dose given here in the emergency department.  She is not hypoxic, has no increased work of breathing and is not septic and I think that she can be safely discharged and managed on oral agents.    I referred her to her primary doctor and will refill her metformin in the interim.  I again expressed the importance of close follow-up as an outpatient and she expresses understanding.  She expresses understanding of strict ER return precautions and was discharged in good condition.      HYDRATION: Based on the patient's presentation of Tachycardia the patient was given IV fluids. IV Hydration was used because oral hydration was not adequate alone. Upon recheck following hydration, the patient was improved.        DISPOSITION AND DISCUSSIONS  I have discussed management of the patient with the following physicians and KEVIN's:  None    Discussion of management with other QHP or appropriate source(s): None     Escalation of care considered, and ultimately not performed:acute inpatient care management, however at this time, the patient is most appropriate for outpatient management    Barriers to care at this time, including but not limited to: Patient does not have established PCP.     Decision tools and prescription drugs considered including, but not limited to: Antibiotics Prescribed Amox and Azithromycin for CAP .    FINAL DIAGNOSIS  1. Community acquired pneumonia of right lower lobe of lung    2. Type 2 diabetes mellitus with hyperglycemia, without long-term current use of insulin (HCC)    3. Chest pain on breathing           Electronically signed by: Ayana Pierce M.D., 4/13/2023 12:56 AM

## 2023-04-13 NOTE — DISCHARGE INSTRUCTIONS
Please return to the ER immediately if you have worsening shortness of breath, high fever, chest pain, vomiting or you have other concern.  Please follow-up with your primary doctor for ongoing management of your diabetes and for evaluation after your ED visit.

## 2023-04-15 ENCOUNTER — APPOINTMENT (OUTPATIENT)
Dept: RADIOLOGY | Facility: MEDICAL CENTER | Age: 43
DRG: 871 | End: 2023-04-15
Attending: EMERGENCY MEDICINE
Payer: MEDICAID

## 2023-04-15 ENCOUNTER — APPOINTMENT (OUTPATIENT)
Dept: RADIOLOGY | Facility: MEDICAL CENTER | Age: 43
DRG: 871 | End: 2023-04-15
Payer: MEDICAID

## 2023-04-15 ENCOUNTER — HOSPITAL ENCOUNTER (INPATIENT)
Facility: MEDICAL CENTER | Age: 43
LOS: 6 days | DRG: 871 | End: 2023-04-21
Attending: EMERGENCY MEDICINE | Admitting: HOSPITALIST
Payer: MEDICAID

## 2023-04-15 DIAGNOSIS — J18.9 PNEUMONIA OF RIGHT LOWER LOBE DUE TO INFECTIOUS ORGANISM: ICD-10-CM

## 2023-04-15 DIAGNOSIS — J86.9 EMPYEMA LUNG (HCC): ICD-10-CM

## 2023-04-15 DIAGNOSIS — A41.9 SEPSIS, DUE TO UNSPECIFIED ORGANISM, UNSPECIFIED WHETHER ACUTE ORGAN DYSFUNCTION PRESENT (HCC): ICD-10-CM

## 2023-04-15 DIAGNOSIS — R09.02 HYPOXIA: ICD-10-CM

## 2023-04-15 DIAGNOSIS — E11.69 TYPE 2 DIABETES MELLITUS WITH OTHER SPECIFIED COMPLICATION, WITHOUT LONG-TERM CURRENT USE OF INSULIN (HCC): ICD-10-CM

## 2023-04-15 PROBLEM — J91.8 PARAPNEUMONIC EFFUSION: Status: ACTIVE | Noted: 2023-04-15

## 2023-04-15 LAB
ALBUMIN SERPL BCP-MCNC: 3 G/DL (ref 3.2–4.9)
ALBUMIN/GLOB SERPL: 0.7 G/DL
ALP SERPL-CCNC: 113 U/L (ref 30–99)
ALT SERPL-CCNC: 10 U/L (ref 2–50)
ANION GAP SERPL CALC-SCNC: 10 MMOL/L (ref 7–16)
APPEARANCE UR: CLEAR
AST SERPL-CCNC: 7 U/L (ref 12–45)
BACTERIA #/AREA URNS HPF: NEGATIVE /HPF
BASOPHILS # BLD AUTO: 0.3 % (ref 0–1.8)
BASOPHILS # BLD: 0.03 K/UL (ref 0–0.12)
BILIRUB SERPL-MCNC: 0.4 MG/DL (ref 0.1–1.5)
BILIRUB UR QL STRIP.AUTO: NEGATIVE
BUN SERPL-MCNC: 4 MG/DL (ref 8–22)
CALCIUM ALBUM COR SERPL-MCNC: 10 MG/DL (ref 8.5–10.5)
CALCIUM SERPL-MCNC: 9.2 MG/DL (ref 8.5–10.5)
CHLORIDE SERPL-SCNC: 95 MMOL/L (ref 96–112)
CO2 SERPL-SCNC: 22 MMOL/L (ref 20–33)
COLOR UR: YELLOW
CREAT SERPL-MCNC: 0.49 MG/DL (ref 0.5–1.4)
EKG IMPRESSION: NORMAL
EOSINOPHIL # BLD AUTO: 0.17 K/UL (ref 0–0.51)
EOSINOPHIL NFR BLD: 1.5 % (ref 0–6.9)
EPI CELLS #/AREA URNS HPF: ABNORMAL /HPF
ERYTHROCYTE [DISTWIDTH] IN BLOOD BY AUTOMATED COUNT: 43.4 FL (ref 35.9–50)
GFR SERPLBLD CREATININE-BSD FMLA CKD-EPI: 120 ML/MIN/1.73 M 2
GLOBULIN SER CALC-MCNC: 4.5 G/DL (ref 1.9–3.5)
GLUCOSE SERPL-MCNC: 257 MG/DL (ref 65–99)
GLUCOSE UR STRIP.AUTO-MCNC: >=1000 MG/DL
HCT VFR BLD AUTO: 35.3 % (ref 37–47)
HGB BLD-MCNC: 10.7 G/DL (ref 12–16)
HYALINE CASTS #/AREA URNS LPF: ABNORMAL /LPF
IMM GRANULOCYTES # BLD AUTO: 0.1 K/UL (ref 0–0.11)
IMM GRANULOCYTES NFR BLD AUTO: 0.9 % (ref 0–0.9)
KETONES UR STRIP.AUTO-MCNC: NEGATIVE MG/DL
LACTATE SERPL-SCNC: 1.7 MMOL/L (ref 0.5–2)
LEUKOCYTE ESTERASE UR QL STRIP.AUTO: ABNORMAL
LYMPHOCYTES # BLD AUTO: 1.24 K/UL (ref 1–4.8)
LYMPHOCYTES NFR BLD: 11 % (ref 22–41)
MCH RBC QN AUTO: 25.2 PG (ref 27–33)
MCHC RBC AUTO-ENTMCNC: 30.3 G/DL (ref 33.6–35)
MCV RBC AUTO: 83.3 FL (ref 81.4–97.8)
MICRO URNS: ABNORMAL
MONOCYTES # BLD AUTO: 0.73 K/UL (ref 0–0.85)
MONOCYTES NFR BLD AUTO: 6.5 % (ref 0–13.4)
MUCOUS THREADS #/AREA URNS HPF: ABNORMAL /HPF
NEUTROPHILS # BLD AUTO: 8.97 K/UL (ref 2–7.15)
NEUTROPHILS NFR BLD: 79.8 % (ref 44–72)
NITRITE UR QL STRIP.AUTO: NEGATIVE
NRBC # BLD AUTO: 0 K/UL
NRBC BLD-RTO: 0 /100 WBC
PH UR STRIP.AUTO: 5.5 [PH] (ref 5–8)
PLATELET # BLD AUTO: 312 K/UL (ref 164–446)
PMV BLD AUTO: 10.4 FL (ref 9–12.9)
POTASSIUM SERPL-SCNC: 4.1 MMOL/L (ref 3.6–5.5)
PROT SERPL-MCNC: 7.5 G/DL (ref 6–8.2)
PROT UR QL STRIP: 30 MG/DL
RBC # BLD AUTO: 4.24 M/UL (ref 4.2–5.4)
RBC # URNS HPF: ABNORMAL /HPF
RBC UR QL AUTO: NEGATIVE
SODIUM SERPL-SCNC: 127 MMOL/L (ref 135–145)
SP GR UR STRIP.AUTO: 1.02
UROBILINOGEN UR STRIP.AUTO-MCNC: 1 MG/DL
WBC # BLD AUTO: 11.2 K/UL (ref 4.8–10.8)
WBC #/AREA URNS HPF: ABNORMAL /HPF
YEAST BUDDING URNS QL: PRESENT /HPF

## 2023-04-15 PROCEDURE — 87086 URINE CULTURE/COLONY COUNT: CPT

## 2023-04-15 PROCEDURE — 87186 SC STD MICRODIL/AGAR DIL: CPT

## 2023-04-15 PROCEDURE — 81001 URINALYSIS AUTO W/SCOPE: CPT

## 2023-04-15 PROCEDURE — 80053 COMPREHEN METABOLIC PANEL: CPT

## 2023-04-15 PROCEDURE — 84145 PROCALCITONIN (PCT): CPT

## 2023-04-15 PROCEDURE — 700111 HCHG RX REV CODE 636 W/ 250 OVERRIDE (IP): Performed by: EMERGENCY MEDICINE

## 2023-04-15 PROCEDURE — 85025 COMPLETE CBC W/AUTO DIFF WBC: CPT

## 2023-04-15 PROCEDURE — 93005 ELECTROCARDIOGRAM TRACING: CPT | Performed by: EMERGENCY MEDICINE

## 2023-04-15 PROCEDURE — 93005 ELECTROCARDIOGRAM TRACING: CPT

## 2023-04-15 PROCEDURE — 700102 HCHG RX REV CODE 250 W/ 637 OVERRIDE(OP): Performed by: EMERGENCY MEDICINE

## 2023-04-15 PROCEDURE — 700117 HCHG RX CONTRAST REV CODE 255: Performed by: EMERGENCY MEDICINE

## 2023-04-15 PROCEDURE — C9803 HOPD COVID-19 SPEC COLLECT: HCPCS | Performed by: EMERGENCY MEDICINE

## 2023-04-15 PROCEDURE — A9270 NON-COVERED ITEM OR SERVICE: HCPCS | Performed by: EMERGENCY MEDICINE

## 2023-04-15 PROCEDURE — 87077 CULTURE AEROBIC IDENTIFY: CPT

## 2023-04-15 PROCEDURE — 770001 HCHG ROOM/CARE - MED/SURG/GYN PRIV*

## 2023-04-15 PROCEDURE — 87040 BLOOD CULTURE FOR BACTERIA: CPT | Mod: 91

## 2023-04-15 PROCEDURE — 36415 COLL VENOUS BLD VENIPUNCTURE: CPT

## 2023-04-15 PROCEDURE — 96365 THER/PROPH/DIAG IV INF INIT: CPT

## 2023-04-15 PROCEDURE — 700105 HCHG RX REV CODE 258: Performed by: EMERGENCY MEDICINE

## 2023-04-15 PROCEDURE — 71045 X-RAY EXAM CHEST 1 VIEW: CPT

## 2023-04-15 PROCEDURE — 99285 EMERGENCY DEPT VISIT HI MDM: CPT

## 2023-04-15 PROCEDURE — 71275 CT ANGIOGRAPHY CHEST: CPT

## 2023-04-15 PROCEDURE — 83605 ASSAY OF LACTIC ACID: CPT

## 2023-04-15 RX ORDER — BISACODYL 10 MG
10 SUPPOSITORY, RECTAL RECTAL
Status: DISCONTINUED | OUTPATIENT
Start: 2023-04-15 | End: 2023-04-21 | Stop reason: HOSPADM

## 2023-04-15 RX ORDER — ALBUTEROL SULFATE 90 UG/1
2 AEROSOL, METERED RESPIRATORY (INHALATION) ONCE
Status: COMPLETED | OUTPATIENT
Start: 2023-04-15 | End: 2023-04-15

## 2023-04-15 RX ORDER — GUAIFENESIN 600 MG/1
600 TABLET, EXTENDED RELEASE ORAL EVERY 12 HOURS
Status: DISCONTINUED | OUTPATIENT
Start: 2023-04-16 | End: 2023-04-21 | Stop reason: HOSPADM

## 2023-04-15 RX ORDER — AZITHROMYCIN 250 MG/1
500 TABLET, FILM COATED ORAL ONCE
Status: COMPLETED | OUTPATIENT
Start: 2023-04-15 | End: 2023-04-15

## 2023-04-15 RX ORDER — AMOXICILLIN 250 MG
2 CAPSULE ORAL 2 TIMES DAILY
Status: DISCONTINUED | OUTPATIENT
Start: 2023-04-16 | End: 2023-04-21 | Stop reason: HOSPADM

## 2023-04-15 RX ORDER — GUAIFENESIN/DEXTROMETHORPHAN 100-10MG/5
10 SYRUP ORAL EVERY 6 HOURS PRN
Status: DISCONTINUED | OUTPATIENT
Start: 2023-04-15 | End: 2023-04-21 | Stop reason: HOSPADM

## 2023-04-15 RX ORDER — LABETALOL HYDROCHLORIDE 5 MG/ML
10 INJECTION, SOLUTION INTRAVENOUS EVERY 4 HOURS PRN
Status: DISCONTINUED | OUTPATIENT
Start: 2023-04-15 | End: 2023-04-21 | Stop reason: HOSPADM

## 2023-04-15 RX ORDER — PROMETHAZINE HYDROCHLORIDE 25 MG/1
12.5-25 TABLET ORAL EVERY 4 HOURS PRN
Status: DISCONTINUED | OUTPATIENT
Start: 2023-04-15 | End: 2023-04-21 | Stop reason: HOSPADM

## 2023-04-15 RX ORDER — PROCHLORPERAZINE EDISYLATE 5 MG/ML
5-10 INJECTION INTRAMUSCULAR; INTRAVENOUS EVERY 4 HOURS PRN
Status: DISCONTINUED | OUTPATIENT
Start: 2023-04-15 | End: 2023-04-21 | Stop reason: HOSPADM

## 2023-04-15 RX ORDER — POLYETHYLENE GLYCOL 3350 17 G/17G
1 POWDER, FOR SOLUTION ORAL
Status: DISCONTINUED | OUTPATIENT
Start: 2023-04-15 | End: 2023-04-21 | Stop reason: HOSPADM

## 2023-04-15 RX ORDER — ACETAMINOPHEN 325 MG/1
650 TABLET ORAL EVERY 6 HOURS PRN
Status: DISCONTINUED | OUTPATIENT
Start: 2023-04-15 | End: 2023-04-21 | Stop reason: HOSPADM

## 2023-04-15 RX ORDER — ONDANSETRON 4 MG/1
4 TABLET, ORALLY DISINTEGRATING ORAL EVERY 4 HOURS PRN
Status: DISCONTINUED | OUTPATIENT
Start: 2023-04-15 | End: 2023-04-21 | Stop reason: HOSPADM

## 2023-04-15 RX ORDER — SODIUM CHLORIDE, SODIUM LACTATE, POTASSIUM CHLORIDE, AND CALCIUM CHLORIDE .6; .31; .03; .02 G/100ML; G/100ML; G/100ML; G/100ML
30 INJECTION, SOLUTION INTRAVENOUS ONCE
Status: COMPLETED | OUTPATIENT
Start: 2023-04-15 | End: 2023-04-16

## 2023-04-15 RX ORDER — ONDANSETRON 2 MG/ML
4 INJECTION INTRAMUSCULAR; INTRAVENOUS EVERY 4 HOURS PRN
Status: DISCONTINUED | OUTPATIENT
Start: 2023-04-15 | End: 2023-04-21 | Stop reason: HOSPADM

## 2023-04-15 RX ORDER — PROMETHAZINE HYDROCHLORIDE 25 MG/1
12.5-25 SUPPOSITORY RECTAL EVERY 4 HOURS PRN
Status: DISCONTINUED | OUTPATIENT
Start: 2023-04-15 | End: 2023-04-21 | Stop reason: HOSPADM

## 2023-04-15 RX ADMIN — SODIUM CHLORIDE, POTASSIUM CHLORIDE, SODIUM LACTATE AND CALCIUM CHLORIDE 1917 ML: 600; 310; 30; 20 INJECTION, SOLUTION INTRAVENOUS at 20:50

## 2023-04-15 RX ADMIN — AMPICILLIN AND SULBACTAM 3 G: 1; 2 INJECTION, POWDER, FOR SOLUTION INTRAMUSCULAR; INTRAVENOUS at 21:31

## 2023-04-15 RX ADMIN — AZITHROMYCIN MONOHYDRATE 500 MG: 250 TABLET ORAL at 21:29

## 2023-04-15 RX ADMIN — IOHEXOL 60 ML: 350 INJECTION, SOLUTION INTRAVENOUS at 23:58

## 2023-04-15 RX ADMIN — ALBUTEROL SULFATE 2 PUFF: 90 AEROSOL, METERED RESPIRATORY (INHALATION) at 20:58

## 2023-04-15 ASSESSMENT — FIBROSIS 4 INDEX: FIB4 SCORE: 0.32

## 2023-04-16 PROBLEM — K74.60 CIRRHOSIS (HCC): Status: ACTIVE | Noted: 2023-04-16

## 2023-04-16 PROBLEM — J18.9 PNEUMONIA: Status: ACTIVE | Noted: 2023-04-16

## 2023-04-16 PROBLEM — Z72.0 TOBACCO ABUSE: Status: ACTIVE | Noted: 2021-08-23

## 2023-04-16 PROBLEM — B37.49 CANDIDURIA: Status: ACTIVE | Noted: 2023-04-16

## 2023-04-16 PROBLEM — J96.01 ACUTE RESPIRATORY FAILURE WITH HYPOXIA (HCC): Status: ACTIVE | Noted: 2023-04-16

## 2023-04-16 LAB
ALBUMIN SERPL BCP-MCNC: 2.7 G/DL (ref 3.2–4.9)
ALBUMIN/GLOB SERPL: 0.7 G/DL
ALP SERPL-CCNC: 100 U/L (ref 30–99)
ALT SERPL-CCNC: 11 U/L (ref 2–50)
AMMONIA PLAS-SCNC: 27 UMOL/L (ref 11–45)
ANION GAP SERPL CALC-SCNC: 9 MMOL/L (ref 7–16)
APTT PPP: 26.7 SEC (ref 24.7–36)
AST SERPL-CCNC: 6 U/L (ref 12–45)
BASOPHILS # BLD AUTO: 0.2 % (ref 0–1.8)
BASOPHILS # BLD: 0.02 K/UL (ref 0–0.12)
BILIRUB SERPL-MCNC: 0.4 MG/DL (ref 0.1–1.5)
BUN SERPL-MCNC: 4 MG/DL (ref 8–22)
CALCIUM ALBUM COR SERPL-MCNC: 9.9 MG/DL (ref 8.5–10.5)
CALCIUM SERPL-MCNC: 8.9 MG/DL (ref 8.5–10.5)
CHLORIDE SERPL-SCNC: 98 MMOL/L (ref 96–112)
CO2 SERPL-SCNC: 24 MMOL/L (ref 20–33)
CREAT SERPL-MCNC: 0.49 MG/DL (ref 0.5–1.4)
EOSINOPHIL # BLD AUTO: 0.15 K/UL (ref 0–0.51)
EOSINOPHIL NFR BLD: 1.4 % (ref 0–6.9)
ERYTHROCYTE [DISTWIDTH] IN BLOOD BY AUTOMATED COUNT: 42.7 FL (ref 35.9–50)
FLUAV RNA SPEC QL NAA+PROBE: NEGATIVE
FLUBV RNA SPEC QL NAA+PROBE: NEGATIVE
GFR SERPLBLD CREATININE-BSD FMLA CKD-EPI: 120 ML/MIN/1.73 M 2
GLOBULIN SER CALC-MCNC: 3.9 G/DL (ref 1.9–3.5)
GLUCOSE BLD STRIP.AUTO-MCNC: 283 MG/DL (ref 65–99)
GLUCOSE BLD STRIP.AUTO-MCNC: 294 MG/DL (ref 65–99)
GLUCOSE BLD STRIP.AUTO-MCNC: 296 MG/DL (ref 65–99)
GLUCOSE BLD STRIP.AUTO-MCNC: 308 MG/DL (ref 65–99)
GLUCOSE SERPL-MCNC: 227 MG/DL (ref 65–99)
HAV IGM SERPL QL IA: NORMAL
HBV CORE IGM SER QL: NORMAL
HBV SURFACE AG SER QL: NORMAL
HCT VFR BLD AUTO: 31.2 % (ref 37–47)
HCV AB SER QL: NORMAL
HGB BLD-MCNC: 9.7 G/DL (ref 12–16)
HIV 1+2 AB+HIV1 P24 AG SERPL QL IA: NORMAL
IMM GRANULOCYTES # BLD AUTO: 0.07 K/UL (ref 0–0.11)
IMM GRANULOCYTES NFR BLD AUTO: 0.7 % (ref 0–0.9)
INR PPP: 1.11 (ref 0.87–1.13)
LACTATE SERPL-SCNC: 1.2 MMOL/L (ref 0.5–2)
LYMPHOCYTES # BLD AUTO: 1.46 K/UL (ref 1–4.8)
LYMPHOCYTES NFR BLD: 13.6 % (ref 22–41)
MAGNESIUM SERPL-MCNC: 1 MG/DL (ref 1.5–2.5)
MCH RBC QN AUTO: 25.6 PG (ref 27–33)
MCHC RBC AUTO-ENTMCNC: 31.1 G/DL (ref 33.6–35)
MCV RBC AUTO: 82.3 FL (ref 81.4–97.8)
MONOCYTES # BLD AUTO: 0.84 K/UL (ref 0–0.85)
MONOCYTES NFR BLD AUTO: 7.8 % (ref 0–13.4)
NEUTROPHILS # BLD AUTO: 8.17 K/UL (ref 2–7.15)
NEUTROPHILS NFR BLD: 76.3 % (ref 44–72)
NRBC # BLD AUTO: 0 K/UL
NRBC BLD-RTO: 0 /100 WBC
PLATELET # BLD AUTO: 268 K/UL (ref 164–446)
PMV BLD AUTO: 11 FL (ref 9–12.9)
POTASSIUM SERPL-SCNC: 3.8 MMOL/L (ref 3.6–5.5)
PROCALCITONIN SERPL-MCNC: 0.11 NG/ML
PROT SERPL-MCNC: 6.6 G/DL (ref 6–8.2)
PROTHROMBIN TIME: 14.2 SEC (ref 12–14.6)
RBC # BLD AUTO: 3.79 M/UL (ref 4.2–5.4)
RSV RNA SPEC QL NAA+PROBE: NEGATIVE
SARS-COV-2 RNA RESP QL NAA+PROBE: NOTDETECTED
SCCMEC + MECA PNL NOSE NAA+PROBE: NEGATIVE
SODIUM SERPL-SCNC: 131 MMOL/L (ref 135–145)
SPECIMEN SOURCE: NORMAL
WBC # BLD AUTO: 10.7 K/UL (ref 4.8–10.8)

## 2023-04-16 PROCEDURE — 36415 COLL VENOUS BLD VENIPUNCTURE: CPT

## 2023-04-16 PROCEDURE — 85730 THROMBOPLASTIN TIME PARTIAL: CPT

## 2023-04-16 PROCEDURE — 85610 PROTHROMBIN TIME: CPT

## 2023-04-16 PROCEDURE — 700111 HCHG RX REV CODE 636 W/ 250 OVERRIDE (IP): Performed by: HOSPITALIST

## 2023-04-16 PROCEDURE — 86015 ACTIN ANTIBODY EACH: CPT

## 2023-04-16 PROCEDURE — 84460 ALANINE AMINO (ALT) (SGPT): CPT

## 2023-04-16 PROCEDURE — 700105 HCHG RX REV CODE 258: Performed by: HOSPITALIST

## 2023-04-16 PROCEDURE — 84450 TRANSFERASE (AST) (SGOT): CPT

## 2023-04-16 PROCEDURE — 83735 ASSAY OF MAGNESIUM: CPT

## 2023-04-16 PROCEDURE — 82140 ASSAY OF AMMONIA: CPT

## 2023-04-16 PROCEDURE — 82962 GLUCOSE BLOOD TEST: CPT | Mod: 91

## 2023-04-16 PROCEDURE — 770001 HCHG ROOM/CARE - MED/SURG/GYN PRIV*

## 2023-04-16 PROCEDURE — 0241U HCHG SARS-COV-2 COVID-19 NFCT DS RESP RNA 4 TRGT MIC: CPT

## 2023-04-16 PROCEDURE — 99223 1ST HOSP IP/OBS HIGH 75: CPT | Mod: 25 | Performed by: HOSPITALIST

## 2023-04-16 PROCEDURE — 700102 HCHG RX REV CODE 250 W/ 637 OVERRIDE(OP): Performed by: HOSPITALIST

## 2023-04-16 PROCEDURE — 83883 ASSAY NEPHELOMETRY NOT SPEC: CPT

## 2023-04-16 PROCEDURE — 80053 COMPREHEN METABOLIC PANEL: CPT

## 2023-04-16 PROCEDURE — 80074 ACUTE HEPATITIS PANEL: CPT

## 2023-04-16 PROCEDURE — 86038 ANTINUCLEAR ANTIBODIES: CPT

## 2023-04-16 PROCEDURE — 87389 HIV-1 AG W/HIV-1&-2 AB AG IA: CPT

## 2023-04-16 PROCEDURE — 83605 ASSAY OF LACTIC ACID: CPT

## 2023-04-16 PROCEDURE — 99406 BEHAV CHNG SMOKING 3-10 MIN: CPT | Performed by: HOSPITALIST

## 2023-04-16 PROCEDURE — 82977 ASSAY OF GGT: CPT

## 2023-04-16 PROCEDURE — 87641 MR-STAPH DNA AMP PROBE: CPT

## 2023-04-16 PROCEDURE — A9270 NON-COVERED ITEM OR SERVICE: HCPCS | Performed by: HOSPITALIST

## 2023-04-16 PROCEDURE — 99222 1ST HOSP IP/OBS MODERATE 55: CPT | Performed by: SURGERY

## 2023-04-16 PROCEDURE — 84520 ASSAY OF UREA NITROGEN: CPT

## 2023-04-16 PROCEDURE — 85025 COMPLETE CBC W/AUTO DIFF WBC: CPT

## 2023-04-16 RX ORDER — FLUCONAZOLE 200 MG/1
100 TABLET ORAL DAILY
Status: DISCONTINUED | OUTPATIENT
Start: 2023-04-16 | End: 2023-04-16

## 2023-04-16 RX ORDER — KETOROLAC TROMETHAMINE 30 MG/ML
30 INJECTION, SOLUTION INTRAMUSCULAR; INTRAVENOUS EVERY 6 HOURS PRN
Status: DISPENSED | OUTPATIENT
Start: 2023-04-16 | End: 2023-04-20

## 2023-04-16 RX ORDER — MAGNESIUM SULFATE HEPTAHYDRATE 40 MG/ML
2 INJECTION, SOLUTION INTRAVENOUS ONCE
Status: COMPLETED | OUTPATIENT
Start: 2023-04-16 | End: 2023-04-16

## 2023-04-16 RX ORDER — OXYCODONE HYDROCHLORIDE 5 MG/1
5 TABLET ORAL EVERY 4 HOURS PRN
Status: DISCONTINUED | OUTPATIENT
Start: 2023-04-16 | End: 2023-04-21 | Stop reason: HOSPADM

## 2023-04-16 RX ADMIN — VANCOMYCIN HYDROCHLORIDE 1000 MG: 500 INJECTION, POWDER, LYOPHILIZED, FOR SOLUTION INTRAVENOUS at 11:09

## 2023-04-16 RX ADMIN — ACETAMINOPHEN 650 MG: 325 TABLET, FILM COATED ORAL at 04:25

## 2023-04-16 RX ADMIN — PIPERACILLIN AND TAZOBACTAM 4.5 G: 4; .5 INJECTION, POWDER, LYOPHILIZED, FOR SOLUTION INTRAVENOUS; PARENTERAL at 02:24

## 2023-04-16 RX ADMIN — PIPERACILLIN AND TAZOBACTAM 4.5 G: 4; .5 INJECTION, POWDER, LYOPHILIZED, FOR SOLUTION INTRAVENOUS; PARENTERAL at 06:00

## 2023-04-16 RX ADMIN — GUAIFENESIN 600 MG: 600 TABLET, EXTENDED RELEASE ORAL at 06:01

## 2023-04-16 RX ADMIN — INSULIN HUMAN 5 UNITS: 100 INJECTION, SOLUTION PARENTERAL at 08:16

## 2023-04-16 RX ADMIN — INSULIN HUMAN 5 UNITS: 100 INJECTION, SOLUTION PARENTERAL at 14:02

## 2023-04-16 RX ADMIN — INSULIN HUMAN 5 UNITS: 100 INJECTION, SOLUTION PARENTERAL at 18:26

## 2023-04-16 RX ADMIN — VANCOMYCIN HYDROCHLORIDE 2750 MG: 500 INJECTION, POWDER, LYOPHILIZED, FOR SOLUTION INTRAVENOUS at 02:15

## 2023-04-16 RX ADMIN — FLUCONAZOLE 100 MG: 200 TABLET ORAL at 06:01

## 2023-04-16 RX ADMIN — DOCUSATE SODIUM 50 MG AND SENNOSIDES 8.6 MG 2 TABLET: 8.6; 5 TABLET, FILM COATED ORAL at 18:20

## 2023-04-16 RX ADMIN — PIPERACILLIN AND TAZOBACTAM 4.5 G: 4; .5 INJECTION, POWDER, LYOPHILIZED, FOR SOLUTION INTRAVENOUS; PARENTERAL at 22:04

## 2023-04-16 RX ADMIN — MAGNESIUM SULFATE HEPTAHYDRATE 2 G: 40 INJECTION, SOLUTION INTRAVENOUS at 06:01

## 2023-04-16 RX ADMIN — KETOROLAC TROMETHAMINE 30 MG: 30 INJECTION, SOLUTION INTRAMUSCULAR; INTRAVENOUS at 22:03

## 2023-04-16 RX ADMIN — GUAIFENESIN 600 MG: 600 TABLET, EXTENDED RELEASE ORAL at 18:20

## 2023-04-16 RX ADMIN — PIPERACILLIN AND TAZOBACTAM 4.5 G: 4; .5 INJECTION, POWDER, LYOPHILIZED, FOR SOLUTION INTRAVENOUS; PARENTERAL at 13:59

## 2023-04-16 ASSESSMENT — LIFESTYLE VARIABLES
SUBSTANCE_ABUSE: 0
HAVE PEOPLE ANNOYED YOU BY CRITICIZING YOUR DRINKING: NO
TOTAL SCORE: 0
AVERAGE NUMBER OF DAYS PER WEEK YOU HAVE A DRINK CONTAINING ALCOHOL: 0
TOTAL SCORE: 0
EVER HAD A DRINK FIRST THING IN THE MORNING TO STEADY YOUR NERVES TO GET RID OF A HANGOVER: NO
HAVE YOU EVER FELT YOU SHOULD CUT DOWN ON YOUR DRINKING: NO
HOW MANY TIMES IN THE PAST YEAR HAVE YOU HAD 5 OR MORE DRINKS IN A DAY: 0
TOTAL SCORE: 0
ON A TYPICAL DAY WHEN YOU DRINK ALCOHOL HOW MANY DRINKS DO YOU HAVE: 0
EVER FELT BAD OR GUILTY ABOUT YOUR DRINKING: NO
ALCOHOL_USE: NO
CONSUMPTION TOTAL: NEGATIVE
DOES PATIENT WANT TO STOP DRINKING: NO

## 2023-04-16 ASSESSMENT — ENCOUNTER SYMPTOMS
WEAKNESS: 0
DEPRESSION: 0
HALLUCINATIONS: 0
FLANK PAIN: 0
EYE PAIN: 0
PSYCHIATRIC NEGATIVE: 1
PND: 0
SPUTUM PRODUCTION: 1
PALPITATIONS: 0
VOMITING: 0
BLURRED VISION: 0
ABDOMINAL PAIN: 0
TINGLING: 0
MYALGIAS: 0
FEVER: 1
CLAUDICATION: 0
ORTHOPNEA: 0
NAUSEA: 0
SHORTNESS OF BREATH: 1
SPUTUM PRODUCTION: 0
CHILLS: 1
PHOTOPHOBIA: 0
FALLS: 0
SORE THROAT: 0
CHILLS: 0
MUSCULOSKELETAL NEGATIVE: 1
HEMOPTYSIS: 0
BRUISES/BLEEDS EASILY: 0
DIZZINESS: 0
CONSTIPATION: 0
WHEEZING: 0
STRIDOR: 0
HEADACHES: 0
SINUS PAIN: 0
DOUBLE VISION: 0
DIARRHEA: 0
COUGH: 1
BACK PAIN: 0
POLYDIPSIA: 0
HEARTBURN: 0
TREMORS: 0
CONSTITUTIONAL NEGATIVE: 1
NECK PAIN: 0
DIAPHORESIS: 0
BLOOD IN STOOL: 0
NAUSEA: 1

## 2023-04-16 ASSESSMENT — COGNITIVE AND FUNCTIONAL STATUS - GENERAL
HELP NEEDED FOR BATHING: A LITTLE
MOVING TO AND FROM BED TO CHAIR: A LITTLE
SUGGESTED CMS G CODE MODIFIER MOBILITY: CK
MOVING TO AND FROM BED TO CHAIR: A LITTLE
MOBILITY SCORE: 18
DAILY ACTIVITIY SCORE: 20
WALKING IN HOSPITAL ROOM: A LITTLE
CLIMB 3 TO 5 STEPS WITH RAILING: A LITTLE
TURNING FROM BACK TO SIDE WHILE IN FLAT BAD: A LITTLE
SUGGESTED CMS G CODE MODIFIER DAILY ACTIVITY: CJ
DAILY ACTIVITIY SCORE: 20
MOVING FROM LYING ON BACK TO SITTING ON SIDE OF FLAT BED: A LITTLE
HELP NEEDED FOR BATHING: A LITTLE
DRESSING REGULAR LOWER BODY CLOTHING: A LITTLE
STANDING UP FROM CHAIR USING ARMS: A LITTLE
CLIMB 3 TO 5 STEPS WITH RAILING: A LITTLE
STANDING UP FROM CHAIR USING ARMS: A LITTLE
DRESSING REGULAR UPPER BODY CLOTHING: A LITTLE
TOILETING: A LITTLE
TOILETING: A LITTLE
DRESSING REGULAR LOWER BODY CLOTHING: A LITTLE
TURNING FROM BACK TO SIDE WHILE IN FLAT BAD: A LITTLE
SUGGESTED CMS G CODE MODIFIER DAILY ACTIVITY: CJ
MOVING FROM LYING ON BACK TO SITTING ON SIDE OF FLAT BED: A LITTLE
WALKING IN HOSPITAL ROOM: A LITTLE
DRESSING REGULAR UPPER BODY CLOTHING: A LITTLE

## 2023-04-16 ASSESSMENT — PAIN DESCRIPTION - PAIN TYPE
TYPE: ACUTE PAIN

## 2023-04-16 ASSESSMENT — FIBROSIS 4 INDEX: FIB4 SCORE: 0.3

## 2023-04-16 NOTE — PROGRESS NOTES
"Patient informed re: pending tests for MRSA, COVID and RSV nasal swab patient initially declined this is the second time she's been asked stated \"I don't like it\" explained what its for patient responded \"ask me again later\".   "

## 2023-04-16 NOTE — ED TRIAGE NOTES
"Jc Ramos  Chief Complaint   Patient presents with    Shortness of Breath     Pt was seen two days ago and she's been feeling worse since. She had RSV in January and was diagnosed with pneumonia during her last visit. She has been taking her abx consistently. Denies respiratory history or chest pain.      Sepsis score of 3, protocol ordered. EKG completed. Pt is having MARTINI. Placed on 4L of oxygen by lobby staff with some improvement. Charge nurse notified.      Patient and staff wearing appropriate PPE    BP (!) 184/103   Pulse (!) 119   Temp 37.1 °C (98.7 °F) (Temporal)   Resp 12   Ht 1.727 m (5' 8\")   Wt 113 kg (250 lb)   SpO2 (!) 85%   BMI 38.01 kg/m²     "

## 2023-04-16 NOTE — PROGRESS NOTES
Admitted from ED via gurney accompanied by transport and significant other. Report received from ED RN. Pt is A&O x 4, on 2 LPM via NC does not use oxygen at baseline mild SOB upon transferring from [facility] to bed, recovers quickly. Patient denies any pain at this time.     Pt educated on how to use the call light, pt verbalized understanding. Bed in lowest locked position, call light within reach, hourly rounding in place. Labs reviewed, orders reviewed, communication board updated.     POC discussed with patient, no concerns noted.     Pt declines any further needs at this time.

## 2023-04-16 NOTE — CARE PLAN
Problem: Knowledge Deficit - Standard  Goal: Patient and family/care givers will demonstrate understanding of plan of care, disease process/condition, diagnostic tests and medications  Outcome: Progressing     Problem: Respiratory  Goal: Patient will achieve/maintain optimum respiratory ventilation and gas exchange  Outcome: Progressing   The patient is Stable - Low risk of patient condition declining or worsening    Shift Goals  Clinical Goals: safety, swab pt, wean off oxygen  Patient Goals: get discharged, wean off oxygen    Progress made toward(s) clinical / shift goals:  Pt VSS. Educated pt on importance of pulmonary hygiene. Plan for IR chest tube placement. Pt NPO at this time. Education provided on importance of getting OOB. MRSA, RSV, COVID all negative. Continue administration of IV ABX treatment.

## 2023-04-16 NOTE — ED NOTES
Med rec updated and complete. Allergies reviewed and updated.Confirmed name and date of birth. Pt  is currently on amoxicillin and  azithromycin. Start date 04/13/23.    [place] [facility] [phone]

## 2023-04-16 NOTE — CONSULTS
DATE OF CONSULTATION:  4/16/2023     REFERRING PHYSICIAN:   Eli Murray M.D.,     CONSULTING PHYSICIAN:  Tam Coburn M.D.     REASON FOR CONSULTATION:  I have been asked by Dr.Thea KELLY Murray M.D.to see the patient in surgical consultation for evaluation of parapneumonic effusion.    HISTORY OF PRESENT ILLNESS: Jc Ramos is a very pleasant 42-year-old female being seen in the emergency department for pneumonia.  She received evaluation including CT scans below.  General surgery consulted for parapneumonic effusion, empyema   Jc Ramos is awake alert and appropriate.  She reports being sick since January.  She reports initially infected with RSV.  Since then shortness of breath cough.  States cough is productive green sputum.  Previous smoker.  Requiring supplemental oxygen  She reports no headache no sore throat.  She states no abdominal pain.  She reports chronic right leg swelling.        PAST MEDICAL HISTORY:  has a past medical history of Diabetes.    PAST SURGICAL HISTORY:  has a past surgical history that includes cholecystectomy; pr c-sec only,prev c-sec; and irrigation & debridement ortho (Left, 8/24/2021).    ALLERGIES:   Allergies   Allergen Reactions    Hydrocodone Vomiting and Nausea       CURRENT MEDICATIONS:    Home Medications       Reviewed by Miriam Lozano (Pharmacy Tech) on 04/15/23 at 2326  Med List Status: Complete     Medication Last Dose Status   amoxicillin (AMOXIL) 500 MG Cap 4/15/2023 Active   azithromycin (ZITHROMAX) 250 MG Tab 4/15/2023 Active   metFORMIN (GLUCOPHAGE) 500 MG Tab 4/15/2023 Active                    FAMILY HISTORY: family history includes Diabetes in her mother.    SOCIAL HISTORY:  reports that she has been smoking cigarettes. She has been smoking an average of .5 packs per day. She has never used smokeless tobacco. She reports that she does not drink alcohol and does not use drugs.    REVIEW OF SYSTEMS: Comprehensive review of systems is  "negative with the exception of the aforementioned HPI, PMH, and PSH bullets in accordance with CMS guidelines.    PHYSICAL EXAMINATION:    Physical Exam  BP (!) 161/101   Pulse (!) 118   Temp 37.1 °C (98.7 °F) (Temporal)   Resp (!) 29   Ht 1.727 m (5' 8\")   Wt 113 kg (250 lb)   SpO2 94%   BMI 38.01 kg/m²    Awake alert appropriate   friendly cooperative  Requiring supplemental oxygen no distress  Increased heart rate skin warm brisk capillary refill  Abdomen soft nontender nondistended  Skin appropriate color and temperature  LABORATORY VALUES:   Recent Labs     04/13/23  0113 04/15/23  2021   WBC 9.0 11.2*   RBC 4.10* 4.24   HEMOGLOBIN 10.5* 10.7*   HEMATOCRIT 33.9* 35.3*   MCV 82.7 83.3   MCH 25.6* 25.2*   MCHC 31.0* 30.3*   RDW 43.4 43.4   PLATELETCT 234 312   MPV 10.8 10.4     Recent Labs     04/13/23  0113 04/15/23  2021   SODIUM 132* 127*   POTASSIUM 3.9 4.1   CHLORIDE 98 95*   CO2 22 22   GLUCOSE 356* 257*   BUN 9 4*   CREATININE 0.63 0.49*   CALCIUM 9.1 9.2     Recent Labs     04/13/23  0113 04/15/23  2021   ASTSGOT 6* 7*   ALTSGPT 11 10   TBILIRUBIN 0.4 0.4   ALKPHOSPHAT 97 113*   GLOBULIN 3.6* 4.5*            IMAGING:   CT-CTA CHEST PULMONARY ARTERY W/ RECONS   Final Result         1. No CT evidence of pulmonary embolism.      2. Moderate loculated right pleural effusion.      3. Patchy airspace opacity in the right middle lobe and right lower lobe, likely pneumonia.      DX-CHEST-PORTABLE (1 VIEW)   Final Result         Worsening airspace opacity in the right lower lung.   Loculated right pleural effusion, worse than prior.          ASSESSMENT AND PLAN:     Pneumonia  Right side loculated effusion    Patient is being admitted by the medicine service  Care as directed by primary  Antibiotic therapy  IR drain empyema protocol  Monitor response to therapy  Follow-up imaging    Discussed findings and plan with ED provider  Discussed findings and plan with patient           " ____________________________________     Tam Coburn M.D.    DD: 4/16/2023  12:33 AM    [facility] NSQIP Surgical Risk Calculator

## 2023-04-16 NOTE — ASSESSMENT & PLAN NOTE
Takes metformin outpatient  A1c is 10.1%  Glucose is better controlled on Lantus 15 units twice daily  ISS  Diabetes education completed  Discussed with her needing to add insulin on discharge, she is okay with continuing insulin

## 2023-04-16 NOTE — PROGRESS NOTES
Pharmacy Vancomycin Kinetics Note for 4/16/2023     42 y.o. female on Vancomycin day # 1     Vancomycin Indication (AUC Dosing): S. aureus pneumonia  Provider specified end date: 04/22/23    Active Antibiotics (From admission, onward)      Ordered     Ordering Provider       Sun Apr 16, 2023 12:17 AM    04/16/23 0017  vancomycin (VANCOCIN) 2,750 mg in  mL IVPB  (vancomycin (VANCOCIN) IV (LD + Maintenance))  ONCE         Mode Garcia M.D.       Sun Apr 16, 2023 12:16 AM    04/16/23 0016  fluconazole (DIFLUCAN) tablet 100 mg  DAILY        Note to Pharmacy: ([facility])    Mode Garcia M.D.       Sat Apr 15, 2023 11:59 PM    04/15/23 2359  MD Alert...Vancomycin per Pharmacy  PHARMACY TO DOSE        Question:  Indication(s) for vancomycin?  Answer:  Pneumonia    Mode Garcia M.D.       Sat Apr 15, 2023 11:51 PM    04/15/23 2351  piperacillin-tazobactam (Zosyn) 4.5 g in  mL IVPB  (piperacillin-tazobactam (ZOSYN) IV (Extended-infusion) PANEL )  ONCE        See Hyperspace for full Linked Orders Report.    Mode Garcia M.D.    04/15/23 2351  piperacillin-tazobactam (Zosyn) 4.5 g in  mL IVPB  (piperacillin-tazobactam (ZOSYN) IV (Extended-infusion) PANEL )  EVERY 8 HOURS        See Hyperspace for full Linked Orders Report.    Mode Garcia M.D.            Dosing Weight: 113 kg (249 lb 1.9 oz)    Admission History: Admitted on 4/15/2023 for Parapneumonic effusion [J18.9, J91.8]  Pertinent history: Patient presented to [facility] ED for c/o SOB. She has been on amoxicillin + azithromycin as outpatient since 4/13/23; patient reports compliance with abx, but no improvement in symptoms. In the ER, patient is noted to be hypoxic requiring 4 LPM O2, tachycardic, and tachypneic. CTA demonstrates moderate loculated pleural effusion on the right side as well as patchy opacities in the right middle lobe and RLL consistent with PNA. Broad-spectrum antibiotics have been ordered on admission.    Allergies:   Hydrocodone  "    Pertinent cultures to date:   Results       Procedure Component Value Units Date/Time    CULTURE RESPIRATORY W/ GRM STN [[id number]]     Order Status: Sent Specimen: Respirate from Sputum     MRSA By PCR (Amp) [[id number]]     Order Status: Sent Specimen: Respirate from Nares     Urinalysis [[id number]]  (Abnormal) Collected: 04/15/23 2323    Order Status: Completed Specimen: Urine Updated: 04/15/23 2357     Color Yellow     Character Clear     Specific Gravity 1.020     Ph 5.5     Glucose >=1000 mg/dL      Ketones Negative mg/dL      Protein 30 mg/dL      Bilirubin Negative     Urobilinogen, Urine 1.0     Nitrite Negative     Leukocyte Esterase Trace     Occult Blood Negative     Micro Urine Req Microscopic    Narrative:      Indication for culture:->Evaluation for sepsis without a  clear source of infection    Urine Culture (New) [[id number]] Collected: 04/15/23 2323    Order Status: Sent Specimen: Urine Updated: 04/15/23 2334    Narrative:      Indication for culture:->Evaluation for sepsis without a  clear source of infection    Blood Culture [[id number]] Collected: 04/15/23 2126    Order Status: Sent Specimen: Blood from Peripheral Updated: 04/15/23 2148    Narrative:      Per Hospital Policy: Only change Specimen Src: to \"Line\" if  specified by physician order.    Blood Culture [[id number]] Collected: 04/15/23 2021    Order Status: Sent Specimen: Blood from Peripheral Updated: 04/15/23 2134    Narrative:      Per Hospital Policy: Only change Specimen Src: to \"Line\" if  specified by physician order.    CoV-2, FLU A/B, and RSV by PCR (2-4 Hours CEPHEID) : Collect NP swab in [facility] [[id number]]     Order Status: Sent Specimen: Respirate             Labs:  Estimated Creatinine Clearance: 197.2 mL/min (A) (by C-G formula based on SCr of 0.49 mg/dL (L)).  Recent Labs     04/13/23  0113 04/15/23  2021   WBC 9.0 11.2*   NEUTSPOLYS 79.80* 79.80*     Recent Labs     04/13/23  0113 04/15/23  2021   BUN 9 4*   CREATININE 0.63 " "0.49*   ALBUMIN 3.4 3.0*     No intake or output data in the 24 hours ending 23 0028   BP (!) 161/101   Pulse (!) 118   Temp 37.1 °C (98.7 °F) (Temporal)   Resp (!) 29   Ht 1.727 m (5' 8\")   Wt 113 kg (250 lb)   SpO2 94%  Temp (24hrs), Av.1 °C (98.7 °F), Min:37.1 °C (98.7 °F), Max:37.1 °C (98.7 °F)      List concerns for Vancomycin clearance:   Malnutrition/Low albumin;Hypermetabolic State (SIRS);Receipt of contrast dye;Obesity    Pharmacokinetics:   AUC kinetics:   Ke (hr ^-1): 0.1588 hr^-1  Half life: 4.36 hr  Clearance: 6.36  Estimated TDD: 3180  Estimated Dose: 848  Estimated interval: 6.4    A/P:     -  Vancomycin dose: initiate 1000 mg IV Q8H     -  Next vancomycin level(s): TBD by rounding pharmacist    -  Predicted vancomycin AUC from initial AUC test calculator: 472 mg·hr/L    -  Comments: MRSA nares ordered to guide de-escalation efforts.     Clint Schmitz, PharmD    "

## 2023-04-16 NOTE — ASSESSMENT & PLAN NOTE
Seen on imaging  JENNY, anti-smooth muscle antibody were normal  negative for viral hepatitis  Will need to follow-up with gastroenterology

## 2023-04-16 NOTE — PROGRESS NOTES
Report received from Mireille BOYCE RN, assumed care at 0700.  Pt is A0X4, and responds appropriately.  Pt declines any SOB, chest pain, new onset of numbness/ tingling.  Pt rates pain at /10, on a scale of 1-10, pt medicated per MAR  Pt is voiding adequatly and without hesitancy. Educated provided of importance of calling before getting OOB.  Pt has + flatus, + bowel sounds, + BM on 4/16 AM.  Pt ambulates with a standby assist.  MRSA, VSR, and COVID swabs completed.   Pt is tolerating a regular diet, pt denies any nausea/vomiting at this time.   Plan of care discussed, all questions answered. Explained importance of oral care. Call light is within reach, treaded slipper socks on, bed in lowest/ locked position, hourly rounding in place, all needs met at this time.

## 2023-04-16 NOTE — H&P
Hospital Medicine History & Physical Note    Date of Service  4/16/2023    Primary Care Physician  Pcp Pt States None    Consultants  general surgery    Specialist Names: Dr. Coburn    Code Status  Full Code    Chief Complaint  Chief Complaint   Patient presents with    Shortness of Breath     Pt was seen two days ago and she's been feeling worse since. She had RSV in January and was diagnosed with pneumonia during her last visit. She has been taking her abx consistently. Denies respiratory history or chest pain.        History of Presenting Illness  Jc Ramos is a 42 y.o. 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  Patient was seen in the emergency room 3 days prior with similar symptoms and discharged with amoxicillin and azithromycin.  Patient states she was compliant with these antibiotics.  Patient's symptoms persisted that prompted her to come to emergency room.  Patient denies alcohol abuse.    EKG interpreted by me found sinus tachycardia without ST segment changes  Chest x-ray interpreted by me found right-sided infiltrate and right-sided pleural effusion  CTA of the chest found moderate loculated right pleural effusion with right middle and right lower lobe pneumonia    I discussed the plan of care with patient.    Review of Systems  Review of Systems   Constitutional:  Positive for chills, fever and malaise/fatigue. Negative for diaphoresis.   HENT:  Negative for congestion, ear discharge, ear pain, hearing loss, nosebleeds, sinus pain, sore throat and tinnitus.    Eyes:  Negative for blurred vision, double vision, photophobia and pain.   Respiratory:  Positive for cough and shortness of breath. Negative for hemoptysis, sputum production, wheezing and stridor.    Cardiovascular:  Positive for leg swelling. Negative for  chest pain, palpitations, orthopnea, claudication and PND.   Gastrointestinal:  Positive for nausea. Negative for abdominal pain, blood in stool, constipation, diarrhea, heartburn, melena and vomiting.   Genitourinary:  Negative for dysuria, flank pain, frequency, hematuria and urgency.   Musculoskeletal:  Negative for back pain, falls, joint pain, myalgias and neck pain.   Skin:  Negative for itching and rash.   Neurological:  Negative for dizziness, tingling, tremors, weakness and headaches.   Endo/Heme/Allergies:  Negative for environmental allergies and polydipsia. Does not bruise/bleed easily.   Psychiatric/Behavioral:  Negative for depression, hallucinations, substance abuse and suicidal ideas.      Past Medical History   has a past medical history of Diabetes.    Surgical History   has a past surgical history that includes cholecystectomy; pr c-sec only,prev c-sec; and irrigation & debridement ortho (Left, 8/24/2021).     Family History  family history includes Diabetes in her mother.   Family history reviewed with patient. There is no family history that is pertinent to the chief complaint.     Social History   reports that she has been smoking cigarettes. She has been smoking an average of .5 packs per day. She has never used smokeless tobacco. She reports that she does not drink alcohol and does not use drugs.    Allergies  Allergies   Allergen Reactions    Hydrocodone Vomiting and Nausea       Medications  Prior to Admission Medications   Prescriptions Last Dose Informant Patient Reported? Taking?   amoxicillin (AMOXIL) 500 MG Cap 4/15/2023 at 0800 Patient No No   Sig: Take 2 Capsules by mouth 3 times a day for 7 days.   azithromycin (ZITHROMAX) 250 MG Tab 4/15/2023 at 0800 Patient No No   Sig: Take 2 Tablets by mouth every day for 1 day, THEN 1 Tablet every day for 4 days.   metFORMIN (GLUCOPHAGE) 500 MG Tab 4/15/2023 at 0800 Patient No No   Sig: Take 1 Tablet by mouth 2 times a day with meals.       Facility-Administered Medications: None       Physical Exam  Temp:  [37.1 °C (98.7 °F)] 37.1 °C (98.7 °F)  Pulse:  [109-121] 118  Resp:  [12-31] 29  BP: (161-184)/() 161/101  SpO2:  [85 %-100 %] 94 %  Blood Pressure: (!) 161/101   Temperature: 37.1 °C (98.7 °F)   Pulse: (!) 118   Respiration: (!) 29   Pulse Oximetry: 94 %       Physical Exam  Vitals and nursing note reviewed.   Constitutional:       General: She is not in acute distress.     Appearance: Normal appearance. She is not ill-appearing, toxic-appearing or diaphoretic.   HENT:      Head: Normocephalic and atraumatic.      Nose: No congestion or rhinorrhea.      Mouth/Throat:      Pharynx: No oropharyngeal exudate or posterior oropharyngeal erythema.   Eyes:      General: No scleral icterus.  Neck:      Vascular: No carotid bruit or JVD.   Cardiovascular:      Rate and Rhythm: Normal rate and regular rhythm.      Pulses: Normal pulses.      Heart sounds: Normal heart sounds. No murmur heard.    No friction rub. No gallop.   Pulmonary:      Effort: Pulmonary effort is normal. No respiratory distress.      Breath sounds: No stridor. Rales present. No wheezing or rhonchi.   Abdominal:      General: Abdomen is flat. There is no distension.      Palpations: There is no mass.      Tenderness: There is no abdominal tenderness. There is no left CVA tenderness, guarding or rebound.      Hernia: No hernia is present.   Musculoskeletal:         General: No swelling. Normal range of motion.      Cervical back: No rigidity. No muscular tenderness.      Right lower leg: Edema present.      Left lower leg: Edema present.   Lymphadenopathy:      Cervical: No cervical adenopathy.   Skin:     General: Skin is warm and dry.      Capillary Refill: Capillary refill takes more than 3 seconds.      Coloration: Skin is not jaundiced or pale.      Findings: No bruising or erythema.   Neurological:      Mental Status: She is alert.       Laboratory:  Recent Labs      04/13/23  0113 04/15/23  2021   WBC 9.0 11.2*   RBC 4.10* 4.24   HEMOGLOBIN 10.5* 10.7*   HEMATOCRIT 33.9* 35.3*   MCV 82.7 83.3   MCH 25.6* 25.2*   MCHC 31.0* 30.3*   RDW 43.4 43.4   PLATELETCT 234 312   MPV 10.8 10.4     Recent Labs     04/13/23  0113 04/15/23  2021   SODIUM 132* 127*   POTASSIUM 3.9 4.1   CHLORIDE 98 95*   CO2 22 22   GLUCOSE 356* 257*   BUN 9 4*   CREATININE 0.63 0.49*   CALCIUM 9.1 9.2     Recent Labs     04/13/23  0113 04/15/23  2021   ALTSGPT 11 10   ASTSGOT 6* 7*   ALKPHOSPHAT 97 113*   TBILIRUBIN 0.4 0.4   GLUCOSE 356* 257*         No results for input(s): NTPROBNP in the last 72 hours.      Recent Labs     04/13/23 0113 04/13/23  0330   TROPONINT 7 <6       Imaging:  CT-CTA CHEST PULMONARY ARTERY W/ RECONS   Final Result         1. No CT evidence of pulmonary embolism.      2. Moderate loculated right pleural effusion.      3. Patchy airspace opacity in the right middle lobe and right lower lobe, likely pneumonia.      DX-CHEST-PORTABLE (1 VIEW)   Final Result         Worsening airspace opacity in the right lower lung.   Loculated right pleural effusion, worse than prior.          X-Ray:  I have personally reviewed the images and compared with prior images.  EKG:  I have personally reviewed the images and compared with prior images.    Assessment/Plan:  Justification for Admission Status  I anticipate this patient will require at least two midnights for appropriate medical management, necessitating inpatient admission because right-sided loculated effusion    Patient will need a Med/Surg bed on SURGICAL service .  The need is secondary to right-sided loculated effusion.    * Parapneumonic effusion- (present on admission)  Assessment & Plan  Found on CT scan  Patient does have a underlying history of cirrhosis  Check surgical cultures  Start IV antibiotics  Surgery has been consulted for chest tube placement    Candiduria  Assessment & Plan  Start fluconazole for 7  days    Pneumonia  Assessment & Plan  Found to have right sided infiltrate  Patient failed outpatient antibiotics and now started on Zosyn and vancomycin follow trough levels  Follow up sputum and blood cultures  Respiratory care protocol   Placed on o2 therapy       Cirrhosis (HCC)  Assessment & Plan  Monitor volume status  Check hepatitis panel, HIV came, JENNY, anti-smooth muscle antibody  Check INR and ammonia  Patient denies alcohol abuse    Acute respiratory failure with hypoxia (HCC)  Assessment & Plan  On 4 L of O2 above baseline    Tobacco abuse- (present on admission)  Assessment & Plan  Tobacco cessation education provided for more than 5 minutes.  We discussed the risks of smoking including increased risk of heart disease, stroke, cancer and COPD. We discussed the benefits of quitting smoking. We discussed options of nicotine patch, wellbutrin and chantix.  The patient has the intention to quit smoking. Nicotine replacement protocol will be provided to the patient.      Hyponatremia- (present on admission)  Assessment & Plan  Unknown etiology-possibly hypervolemic hyponatremia from underlying cirrhosis  I will hold IV Lasix for now    Type 2 diabetes mellitus with hyperglycemia, without long-term current use of insulin (HCC)- (present on admission)  Assessment & Plan  Start on insulin sliding scale with serial Accu-Checks  Check hemoglobin A1c  Hypoglycemic protocol in place            VTE prophylaxis: SCDs/TEDs

## 2023-04-16 NOTE — CARE PLAN
The patient is Stable - Low risk of patient condition declining or worsening    Shift Goals  Clinical Goals: wean off oxygen, surgery consult    Progress made toward(s) clinical / shift goals:  remained on 2 LPM via NC did not need more. Awaiting to be seen by surgery.     Problem: Knowledge Deficit - Standard  Goal: Patient and family/care givers will demonstrate understanding of plan of care, disease process/condition, diagnostic tests and medications  4/16/2023 0656 by Mireille Bosch RDEBI.  Outcome: Progressing  Problem: Respiratory  Goal: Patient will achieve/maintain optimum respiratory ventilation and gas exchange  4/16/2023 0656 by Mireille Bosch R.N.  Outcome: Progressing    Patient is not progressing towards the following goals:

## 2023-04-16 NOTE — PROGRESS NOTES
[facility] Medicine Daily Progress Note    Date of Service  4/16/2023    Chief Complaint  Jc Ramos is a 42 y.o. female admitted 4/15/2023 with shortness of breath, pleuritic chest pain, productive green cough and fevers.    Hospital Course  Jc Ramos is a 42 y.o. 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  Patient was seen in the emergency room 3 days prior with similar symptoms and discharged with amoxicillin and azithromycin.  Patient states she was compliant with these antibiotics.  Patient's symptoms persisted that prompted her to come to emergency room.  Patient denies alcohol abuse.     EKG found sinus tachycardia without ST segment changes  Chest x-ray found right-sided infiltrate and right-sided pleural effusion  CTA of the chest found moderate loculated right pleural effusion with right middle and right lower lobe pneumonia.  Surgery consulted and recommend IR drain empyema protocol.    Interval Problem Update  4/16 afebrile, vitals are stable, on 2 L liters nasal cannula.  White count is trending down.  Sodium is improving.  Alk phos is trending down.  Patient is n.p.o. awaiting IR drain empyema protocol.    I have discussed this patient's plan of care and discharge plan at IDT rounds today with Case Management, Nursing, Nursing leadership, and other members of the IDT team.    Consultants/Specialty  general surgery    Code Status  Full Code    Disposition  Patient is not medically cleared for discharge.   Anticipate discharge to to home with close outpatient follow-up.  I have placed the appropriate orders for post-discharge needs.    Review of Systems  Review of Systems   Constitutional:  Positive for fever. Negative for chills and malaise/fatigue.   Respiratory:  Positive for cough, sputum production and shortness  of breath.    Cardiovascular:  Negative for chest pain, palpitations and leg swelling.   Gastrointestinal:  Negative for abdominal pain, diarrhea, heartburn, nausea and vomiting.   Genitourinary:  Negative for dysuria, frequency and urgency.   Neurological:  Negative for dizziness and headaches.   All other systems reviewed and are negative.     Physical Exam  Temp:  [36.3 °C (97.3 °F)-37.1 °C (98.7 °F)] 36.6 °C (97.9 °F)  Pulse:  [] 84  Resp:  [12-31] 18  BP: (151-184)/() 154/79  SpO2:  [85 %-100 %] 92 %    Physical Exam  Vitals and nursing note reviewed.   Constitutional:       Appearance: Normal appearance. She is not ill-appearing.   HENT:      Head: Normocephalic and atraumatic.      Jaw: There is normal jaw occlusion.      Right Ear: Hearing normal.      Left Ear: Hearing normal.      Nose: Nose normal.      Mouth/Throat:      Lips: Pink.      Mouth: Mucous membranes are moist.   Eyes:      Extraocular Movements: Extraocular movements intact.      Conjunctiva/sclera: Conjunctivae normal.      Pupils: Pupils are equal, round, and reactive to light.   Neck:      Vascular: No carotid bruit.   Cardiovascular:      Rate and Rhythm: Normal rate and regular rhythm.      Pulses: Normal pulses.      Heart sounds: Normal heart sounds, S1 normal and S2 normal.   Pulmonary:      Effort: Pulmonary effort is normal.      Breath sounds: Decreased air movement present. No stridor. Examination of the right-lower field reveals decreased breath sounds. Examination of the left-lower field reveals decreased breath sounds. Decreased breath sounds present.   Abdominal:      General: Bowel sounds are normal.      Palpations: Abdomen is soft.      Tenderness: There is no abdominal tenderness.   Musculoskeletal:      Cervical back: Normal range of motion and neck supple.      Right lower leg: No edema.      Left lower leg: No edema.   Skin:     General: Skin is warm and dry.      Capillary Refill: Capillary refill takes  less than 2 seconds.   Neurological:      General: No focal deficit present.      Mental Status: She is alert and oriented to person, place, and time. Mental status is at baseline.      Sensory: Sensation is intact.      Motor: Motor function is intact.   Psychiatric:         Attention and Perception: Attention and perception normal.         Mood and Affect: Mood and affect normal.         Speech: Speech normal.         Behavior: Behavior normal. Behavior is cooperative.       Fluids    Intake/Output Summary (Last 24 hours) at 4/16/2023 1130  Last data filed at 4/16/2023 0310  Gross per 24 hour   Intake 240 ml   Output 0 ml   Net 240 ml       Laboratory  Recent Labs     04/15/23  2021 04/16/23  0125   WBC 11.2* 10.7   RBC 4.24 3.79*   HEMOGLOBIN 10.7* 9.7*   HEMATOCRIT 35.3* 31.2*   MCV 83.3 82.3   MCH 25.2* 25.6*   MCHC 30.3* 31.1*   RDW 43.4 42.7   PLATELETCT 312 268   MPV 10.4 11.0     Recent Labs     04/15/23  2021 04/16/23  0125   SODIUM 127* 131*   POTASSIUM 4.1 3.8   CHLORIDE 95* 98   CO2 22 24   GLUCOSE 257* 227*   BUN 4* 4*   CREATININE 0.49* 0.49*   CALCIUM 9.2 8.9     Recent Labs     04/16/23  0125   APTT 26.7   INR 1.11               Imaging  CT-CTA CHEST PULMONARY ARTERY W/ RECONS   Final Result         1. No CT evidence of pulmonary embolism.      2. Moderate loculated right pleural effusion.      3. Patchy airspace opacity in the right middle lobe and right lower lobe, likely pneumonia.      DX-CHEST-PORTABLE (1 VIEW)   Final Result         Worsening airspace opacity in the right lower lung.   Loculated right pleural effusion, worse than prior.      IR-CHEST TUBE-EMPYEMA RIGHT    (Results Pending)        Assessment/Plan  * Parapneumonic effusion- (present on admission)  Assessment & Plan  Found on CT scan  Patient does have a underlying history of cirrhosis  Check surgical cultures  Start IV antibiotics  Surgery has been consulted and recommend IR drain empyema protocol    Pneumonia  Assessment &  Plan  Found to have right sided infiltrate  Patient failed outpatient antibiotics and now started on Zosyn and vancomycin follow trough levels  Follow up sputum and blood cultures  Respiratory care protocol   Placed on o2 therapy       Acute respiratory failure with hypoxia (HCC)  Assessment & Plan  On 4 L of O2 above baseline    Candiduria  Assessment & Plan  Start fluconazole for 7 days    Cirrhosis (HCC)  Assessment & Plan  Monitor volume status  Check hepatitis panel, HIV came, JENNY, anti-smooth muscle antibody  Check INR and ammonia  Patient denies alcohol abuse    Hyponatremia- (present on admission)  Assessment & Plan  Unknown etiology-possibly hypervolemic hyponatremia from underlying cirrhosis  I will hold IV Lasix for now    Type 2 diabetes mellitus with hyperglycemia, without long-term current use of insulin (HCC)- (present on admission)  Assessment & Plan  Start on insulin sliding scale with serial Accu-Checks  Check hemoglobin A1c  Hypoglycemic protocol in place        Tobacco abuse- (present on admission)  Assessment & Plan  Tobacco cessation education provided for more than 5 minutes.  We discussed the risks of smoking including increased risk of heart disease, stroke, cancer and COPD. We discussed the benefits of quitting smoking. We discussed options of nicotine patch, wellbutrin and chantix.  The patient has the intention to quit smoking. Nicotine replacement protocol will be provided to the patient.           VTE prophylaxis: SCDs/TEDs    I have performed a physical exam and reviewed and updated ROS and Plan today (4/16/2023). In review of yesterday's note (4/15/2023), there are no changes except as documented above.

## 2023-04-16 NOTE — ED NOTES
Pt in bed connected to monitor, cc of RSV and worsening SoB x1wk. Pt currently on 4lNC. Aox4. LR bolus infusing.

## 2023-04-16 NOTE — ASSESSMENT & PLAN NOTE
4/17 Right chest tube placement for loculated effusion/empyema.  4/18 through 4/20 Intrapleual fibrinolytics with pulmonology.   4/20 CT chest. - min effusion - CT to water seal when ambulating.   4/21 CT to water seal.    ABX per medicine.   ACS gray.

## 2023-04-16 NOTE — ASSESSMENT & PLAN NOTE
Found on CT scan  Patient does have a underlying history of cirrhosis  Pleural fluid culture has no growth  Continue on Zosyn, day 7  Surgery consulted and chest tube to waterseal  Pulmonology completed 6 doses of lytic therapy.  CT chest shows improvement of loculated fluid  Check chest x-ray tomorrow

## 2023-04-16 NOTE — DIETARY
NUTRITION SERVICES: BMI - Pt with BMI >40 (=Body mass index is 42.84 kg/m².), Class III obesity. Weight loss counseling not appropriate in acute care setting. RECOMMEND - If appropriate at DC please refer to outpatient nutrition services for weight management.

## 2023-04-16 NOTE — PROGRESS NOTES
Pt complaining about bedside commode not next to pt bed. Educated pt that due to her not calling before getting OOB, bedside commode next to bed is a fall risk. Pt noncompliant with instruction, education reinforcement needed.

## 2023-04-16 NOTE — HOSPITAL COURSE
Jc Ramos is a 42 y.o. 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  Patient was seen in the emergency room 3 days prior with similar symptoms and discharged with amoxicillin and azithromycin.  Patient states she was compliant with these antibiotics.  Patient's symptoms persisted that prompted her to come to emergency room.  Patient denies alcohol abuse.     EKG found sinus tachycardia without ST segment changes  Chest x-ray found right-sided infiltrate and right-sided pleural effusion  CTA of the chest found moderate loculated right pleural effusion with right middle and right lower lobe pneumonia.  Surgery consulted and recommend IR drain empyema protocol.

## 2023-04-16 NOTE — PROGRESS NOTES
4 Eyes Skin Assessment Completed by CAMACHO Kendrick and CAMACHO Beckett.    Head WDL  Ears WDL  Nose WDL  Mouth WDL  Neck WDL  Breast/Chest WDL  Shoulder Blades WDL  Spine WDL  (R) Arm/Elbow/Hand small burn, intact, red  (L) Arm/Elbow/Hand WDL  Abdomen WDL  Groin WDL  Scrotum/Coccyx/Buttocks WDL  (R) Leg WDL  (L) Leg WDL  (R) Heel/Foot/Toe WDL  (L) Heel/Foot/Toe WDL          Devices In Places Pulse Ox and Nasal Cannula      Interventions In Place Gray Ear Foams and Pillows    Possible Skin Injury No    Pictures Uploaded Into Epic N/A  Wound Consult Placed N/A  RN Wound Prevention Protocol Ordered Yes

## 2023-04-16 NOTE — ED PROVIDER NOTES
ED Provider Note    CHIEF COMPLAINT  Chief Complaint   Patient presents with    Shortness of Breath     Pt was seen two days ago and she's been feeling worse since. She had RSV in January and was diagnosed with pneumonia during her last visit. She has been taking her abx consistently. Denies respiratory history or chest pain.        EXTERNAL RECORDS REVIEWED  Outpatient labs & studies patient had a CTA of the chest 4/13/2023 which showed a right middle and right lower lobe infiltrate and a small right pleural effusion    HPI/ROS  LIMITATION TO HISTORY   Select: : None  OUTSIDE HISTORIAN(S):  none    Jc Ramos is a 42 y.o. female who presents complaining of progressively worsening productive cough shortness of breath and weakness.Patient states that she had RSV in January and was seen here in the emergency department 2 days ago and diagnosed with pneumonia.  She was discharged home with amoxicillin and azithromycin and has been taking her antibiotics as directed.  She states however that she has had worsening shortness of breath and weakness.  When she arrived she had low oxygen 85% on room air and is now requiring oxygen support.  The patient states that she has been smoking cigarettes up until she got sick this week.  She has chronic right lower leg swelling.  She denies any pain in her chest.  She states she is coughing up green sputum.  The patient is diabetic and was restarted on her metformin 2 days ago as well.  She has no primary care physician.    PAST MEDICAL HISTORY   has a past medical history of Diabetes.    SURGICAL HISTORY   has a past surgical history that includes cholecystectomy; c-sec only,prev c-sec; and irrigation & debridement ortho (Left, 8/24/2021).    FAMILY HISTORY  Family History   Problem Relation Age of Onset    Diabetes Mother        SOCIAL HISTORY  Social History     Tobacco Use    Smoking status: Every Day     Packs/day: 0.50     Types: Cigarettes    Smokeless tobacco: Never     "Tobacco comments:     1/2 ppd since age 16   Vaping Use    Vaping Use: Never used   Substance and Sexual Activity    Alcohol use: No    Drug use: No    Sexual activity: Not on file       CURRENT MEDICATIONS  Home Medications       Reviewed by Miriam Lozano (Pharmacy Tech) on 04/15/23 at 2326  Med List Status: Complete     Medication Last Dose Status   amoxicillin (AMOXIL) 500 MG Cap 4/15/2023 Active   azithromycin (ZITHROMAX) 250 MG Tab 4/15/2023 Active   metFORMIN (GLUCOPHAGE) 500 MG Tab 4/15/2023 Active                    ALLERGIES  Allergies   Allergen Reactions    Hydrocodone Vomiting and Nausea       PHYSICAL EXAM  VITAL SIGNS: BP (!) 184/103   Pulse (!) 121   Temp 37.1 °C (98.7 °F) (Temporal)   Resp (!) 24   Ht 1.727 m (5' 8\")   Wt 113 kg (250 lb)   SpO2 98%   BMI 38.01 kg/m²      Constitutional: Well developed, Well nourished, No acute distress, Non-toxic appearance.   HEENT: Normocephalic, Atraumatic,  external ears normal, pharynx pink,  Mucous  Membranes moist, No rhinorrhea or mucosal edema  Eyes: PERRL, EOMI, Conjunctiva normal, No discharge.   Neck: Normal range of motion, No tenderness, Supple, No stridor.   Lymphatic: No lymphadenopathy    Cardiovascular: tachycardic Regular Rate and Rhythm, No murmurs,  rubs, or gallops.   Thorax & Lungs: Patient is speaking full sentences.  She has scattered rhonchi in the right upper and lower chest left side of her lungs are clear to auscultation  Abdomen: Elevated BMI bowel sounds normal, Soft, non tender, non distended,  No pulsatile masses., no rebound guarding or peritoneal signs.   Skin: Warm, Dry, No erythema, No rash,   Back:  No CVA tenderness,  No spinal tenderness, bony crepitance step offs or instability.   Extremities: Equal, intact distal pulses, No cyanosis, clubbing bilateral trace lower extremity edema,  No tenderness.   Musculoskeletal: Good range of motion in all major joints. No tenderness to palpation or major deformities noted. "   Neurologic: Alert & oriented x 3,   No focal deficits noted.   Psychiatric: Affect normal, Judgment normal, Mood normal.     DIAGNOSTIC STUDIES / PROCEDURES  EKG  I have independently interpreted this EKG  See below    LABS  Results for orders placed or performed during the hospital encounter of 04/15/23   Lactic acid (lactate)   Result Value Ref Range    Lactic Acid 1.7 0.5 - 2.0 mmol/L   CBC With Differential   Result Value Ref Range    WBC 11.2 (H) 4.8 - 10.8 K/uL    RBC 4.24 4.20 - 5.40 M/uL    Hemoglobin 10.7 (L) 12.0 - 16.0 g/dL    Hematocrit 35.3 (L) 37.0 - 47.0 %    MCV 83.3 81.4 - 97.8 fL    MCH 25.2 (L) 27.0 - 33.0 pg    MCHC 30.3 (L) 33.6 - 35.0 g/dL    RDW 43.4 35.9 - 50.0 fL    Platelet Count 312 164 - 446 K/uL    MPV 10.4 9.0 - 12.9 fL    Neutrophils-Polys 79.80 (H) 44.00 - 72.00 %    Lymphocytes 11.00 (L) 22.00 - 41.00 %    Monocytes 6.50 0.00 - 13.40 %    Eosinophils 1.50 0.00 - 6.90 %    Basophils 0.30 0.00 - 1.80 %    Immature Granulocytes 0.90 0.00 - 0.90 %    Nucleated RBC 0.00 /100 WBC    Neutrophils (Absolute) 8.97 (H) 2.00 - 7.15 K/uL    Lymphs (Absolute) 1.24 1.00 - 4.80 K/uL    Monos (Absolute) 0.73 0.00 - 0.85 K/uL    Eos (Absolute) 0.17 0.00 - 0.51 K/uL    Baso (Absolute) 0.03 0.00 - 0.12 K/uL    Immature Granulocytes (abs) 0.10 0.00 - 0.11 K/uL    NRBC (Absolute) 0.00 K/uL   Comp Metabolic Panel   Result Value Ref Range    Sodium 127 (L) 135 - 145 mmol/L    Potassium 4.1 3.6 - 5.5 mmol/L    Chloride 95 (L) 96 - 112 mmol/L    Co2 22 20 - 33 mmol/L    Anion Gap 10.0 7.0 - 16.0    Glucose 257 (H) 65 - 99 mg/dL    Bun 4 (L) 8 - 22 mg/dL    Creatinine 0.49 (L) 0.50 - 1.40 mg/dL    Calcium 9.2 8.5 - 10.5 mg/dL    AST(SGOT) 7 (L) 12 - 45 U/L    ALT(SGPT) 10 2 - 50 U/L    Alkaline Phosphatase 113 (H) 30 - 99 U/L    Total Bilirubin 0.4 0.1 - 1.5 mg/dL    Albumin 3.0 (L) 3.2 - 4.9 g/dL    Total Protein 7.5 6.0 - 8.2 g/dL    Globulin 4.5 (H) 1.9 - 3.5 g/dL    A-G Ratio 0.7 g/dL   Urinalysis     Specimen: Urine   Result Value Ref Range    Micro Urine Req Microscopic    CORRECTED CALCIUM   Result Value Ref Range    Correct Calcium 10.0 8.5 - 10.5 mg/dL   ESTIMATED GFR   Result Value Ref Range    GFR (CKD-EPI) 120 >60 mL/min/1.73 m 2   EKG (NOW)   Result Value Ref Range    Report       [facility] Emergency Dept.    Test Date:  2023-04-15  Pt Name:    CARRIE WEBSTER               Department: ER  MRN:        [id number]                      Room:  Gender:     Female                       Technician: [id number]  :        1980                   Requested By:ER TRIAGE PROTOCOL  Order #:    [id number]                    Reading MD: BRYAN SILVA MD    Measurements  Intervals                                Axis  Rate:       118                          P:          52  NH:         115                          QRS:        41  QRSD:       90                           T:          16  QT:         311  QTc:        436    Interpretive Statements  Sinus tachycardia  Probable left atrial enlargement  RSR' in V1 or V2, right VCD or RVH  Compared to ECG 2023 00:37:55  No significant changes  Electronically Signed On 4- 22:55:25 PDT by BRYAN SILVA MD           RADIOLOGY  I have independently interpreted the diagnostic imaging associated with this visit and am waiting the final reading from the radiologist.   My preliminary interpretation is as follows: CXR: Worsening right lower lobe pneumonia  Radiologist interpretation:   DX-CHEST-PORTABLE (1 VIEW)   Final Result         Worsening airspace opacity in the right lower lung.   Loculated right pleural effusion, worse than prior.      CT-CTA CHEST PULMONARY ARTERY W/ RECONS    (Results Pending)         COURSE & MEDICAL DECISION MAKING    ED Observation Status? No; Patient does not meet criteria for ED Observation.  The patient has sepsis and hypoxia in the face of pneumonia and will require admission to the hospital    INITIAL ASSESSMENT, COURSE  AND PLAN  Care Narrative: This is a 42-year-old female who comes in hypoxic and septic from pneumonia diagnosed 2 days ago.  She was placed on antibiotics amoxicillin and azithromycin which she has been taking.  She is found to be hypoxic today and tachycardic.  The patient has a history of tobacco use up until her current illness.  She has diabetes and had not been taking metformin until it was represcribed her 2 days ago.  On physical exam she is hypoxic with rhonchorous breath sounds on the right.  Abdomen is soft and she has trace edema to her lower extremities.  I have ordered labs fluids and antibiotics per the septic protocol.  I gave her an albuterol HFA treatment and ordered a COVID test.  HYDRATION: Based on the patient's presentation of Sepsis the patient was given IV fluids. IV Hydration was used because oral hydration was not adequate alone. Upon recheck following hydration, the patient was improved.      ADDITIONAL PROBLEM LIST  Diabetes  Tobacco use  DISPOSITION AND DISCUSSIONS    Patient's white blood cell count is elevated at 11.2.  Hemoglobin is 10.7 which is slightly anemic.  Neutrophils elevated at 79.8.  She has no immature granulocytes.  Sodium is low at 127 chloride 95 glucose elevated at 257.  The patient does not have any evidence of DKA as her CO2 is 22.  BUN is 4 creatinine 0.49.  Liver function tests are normal.  Lactic acid is 1.7.  Blood cultures were drawn and the patient was given a dose of IV antibiotics as well as IV fluids for the sepsis protocol.  Requiring oxygen support with 4 L nasal cannula oxygen which brings her oxygen to 95%.    Patient CT is still pending.  I did speak with surgery Dr. Coburn who would like a call back with the results of this to see if she needs surgical decortication for an empyema.  The patient has a stable blood pressure she is still tachycardic.  She will be admitted to telemetry to the hospitalist for antibiotics blood sugar control and further  evaluation of her worsening pneumonia.    Patient has acute worsening pneumonia with a possible empyema.  She has chronic diabetes which can contribute to the severity of her infection.  Patient also smokes which can worsen her lung function at baseline.    I have discussed management of the patient with the following physicians and KEVIN's: Hospitalist Dr. Garcia who will admit the patient for antibiotics and further care.   General surgery Dr. Coburn surgery who will evaluate her for possible lung decortication of empyema if it is large enough to perform surgery on the CT.    Discussion of management with other [facility] or appropriate source(s): Pharmacy regarding antibiotics      Escalation of care considered, and ultimately not performed: none    Barriers to care at this time, including but not limited to: Patient does not have a primary care physician.     Decision tools and prescription drugs considered including, but not limited to: Antibiotics unasyn and  azithromycin .      Critical Care  Due to the real possibility of a deterioration of this patient's condition required the highest level of my preparedness for sudden emergent intervention. I provided critical care services which included medication orders, frequent reevaluations of the patient's condition and response to treatment, ordering and reviewing test results and discussing the case with various consultants. The critical care time associated with the care of the patient was 40 minutes. Review chart for interventions. This time is exclusive of any other billable procedures.    FINAL DIAGNOSIS  1. Pneumonia of right lower lobe due to infectious organism    2. Hypoxia    3. Sepsis, due to unspecified organism, unspecified whether acute organ dysfunction present (HCC)    4. Empyema lung (HCC)    5. Type 2 diabetes mellitus with other specified complication, without long-term current use of insulin (HCC)           Electronically signed by: Eli Murray  M.D., 4/15/2023 8:11 PM

## 2023-04-17 ENCOUNTER — APPOINTMENT (OUTPATIENT)
Dept: RADIOLOGY | Facility: MEDICAL CENTER | Age: 43
DRG: 871 | End: 2023-04-17
Attending: NURSE PRACTITIONER
Payer: MEDICAID

## 2023-04-17 LAB
ALBUMIN SERPL BCP-MCNC: 2.6 G/DL (ref 3.2–4.9)
ALBUMIN/GLOB SERPL: 0.6 G/DL
ALP SERPL-CCNC: 106 U/L (ref 30–99)
ALT SERPL-CCNC: 7 U/L (ref 2–50)
ANION GAP SERPL CALC-SCNC: 12 MMOL/L (ref 7–16)
APPEARANCE FLD: CLEAR
AST SERPL-CCNC: 9 U/L (ref 12–45)
BILIRUB SERPL-MCNC: 0.3 MG/DL (ref 0.1–1.5)
BODY FLD TYPE: NORMAL
BODY FLD TYPE: NORMAL
BUN SERPL-MCNC: 4 MG/DL (ref 8–22)
CALCIUM ALBUM COR SERPL-MCNC: 10.2 MG/DL (ref 8.5–10.5)
CALCIUM SERPL-MCNC: 9.1 MG/DL (ref 8.5–10.5)
CHLORIDE SERPL-SCNC: 99 MMOL/L (ref 96–112)
CO2 SERPL-SCNC: 22 MMOL/L (ref 20–33)
COLOR FLD: YELLOW
CREAT SERPL-MCNC: 0.62 MG/DL (ref 0.5–1.4)
ERYTHROCYTE [DISTWIDTH] IN BLOOD BY AUTOMATED COUNT: 44.5 FL (ref 35.9–50)
EST. AVERAGE GLUCOSE BLD GHB EST-MCNC: 243 MG/DL
GFR SERPLBLD CREATININE-BSD FMLA CKD-EPI: 113 ML/MIN/1.73 M 2
GLOBULIN SER CALC-MCNC: 4.3 G/DL (ref 1.9–3.5)
GLUCOSE BLD STRIP.AUTO-MCNC: 173 MG/DL (ref 65–99)
GLUCOSE BLD STRIP.AUTO-MCNC: 235 MG/DL (ref 65–99)
GLUCOSE BLD STRIP.AUTO-MCNC: 277 MG/DL (ref 65–99)
GLUCOSE BLD STRIP.AUTO-MCNC: 373 MG/DL (ref 65–99)
GLUCOSE FLD-MCNC: 147 MG/DL
GLUCOSE SERPL-MCNC: 221 MG/DL (ref 65–99)
GRAM STN SPEC: NORMAL
HBA1C MFR BLD: 10.1 % (ref 4–5.6)
HCG UR QL: NEGATIVE
HCT VFR BLD AUTO: 33.9 % (ref 37–47)
HGB BLD-MCNC: 10.2 G/DL (ref 12–16)
LYMPHOCYTES NFR FLD: 8 %
MCH RBC QN AUTO: 25.2 PG (ref 27–33)
MCHC RBC AUTO-ENTMCNC: 30.1 G/DL (ref 33.6–35)
MCV RBC AUTO: 83.7 FL (ref 81.4–97.8)
NEUTROPHILS NFR FLD: 92 %
PLATELET # BLD AUTO: 282 K/UL (ref 164–446)
PMV BLD AUTO: 10.4 FL (ref 9–12.9)
POTASSIUM SERPL-SCNC: 4.3 MMOL/L (ref 3.6–5.5)
PROT SERPL-MCNC: 6.9 G/DL (ref 6–8.2)
RBC # BLD AUTO: 4.05 M/UL (ref 4.2–5.4)
RBC # FLD: 2000 CELLS/UL
SIGNIFICANT IND 70042: NORMAL
SITE SITE: NORMAL
SODIUM SERPL-SCNC: 133 MMOL/L (ref 135–145)
SOURCE SOURCE: NORMAL
WBC # BLD AUTO: 8.2 K/UL (ref 4.8–10.8)
WBC # FLD: 338 CELLS/UL

## 2023-04-17 PROCEDURE — 36415 COLL VENOUS BLD VENIPUNCTURE: CPT

## 2023-04-17 PROCEDURE — 32557 INSERT CATH PLEURA W/ IMAGE: CPT | Mod: RT

## 2023-04-17 PROCEDURE — 81025 URINE PREGNANCY TEST: CPT

## 2023-04-17 PROCEDURE — 700102 HCHG RX REV CODE 250 W/ 637 OVERRIDE(OP): Performed by: HOSPITALIST

## 2023-04-17 PROCEDURE — 700102 HCHG RX REV CODE 250 W/ 637 OVERRIDE(OP)

## 2023-04-17 PROCEDURE — 3E0L3GC INTRODUCTION OF OTHER THERAPEUTIC SUBSTANCE INTO PLEURAL CAVITY, PERCUTANEOUS APPROACH: ICD-10-PCS | Performed by: INTERNAL MEDICINE

## 2023-04-17 PROCEDURE — 700111 HCHG RX REV CODE 636 W/ 250 OVERRIDE (IP): Performed by: RADIOLOGY

## 2023-04-17 PROCEDURE — 82945 GLUCOSE OTHER FLUID: CPT

## 2023-04-17 PROCEDURE — 700105 HCHG RX REV CODE 258: Performed by: INTERNAL MEDICINE

## 2023-04-17 PROCEDURE — 700111 HCHG RX REV CODE 636 W/ 250 OVERRIDE (IP): Performed by: HOSPITALIST

## 2023-04-17 PROCEDURE — 80053 COMPREHEN METABOLIC PANEL: CPT

## 2023-04-17 PROCEDURE — 0W9930Z DRAINAGE OF RIGHT PLEURAL CAVITY WITH DRAINAGE DEVICE, PERCUTANEOUS APPROACH: ICD-10-PCS | Performed by: RADIOLOGY

## 2023-04-17 PROCEDURE — A9270 NON-COVERED ITEM OR SERVICE: HCPCS | Performed by: HOSPITALIST

## 2023-04-17 PROCEDURE — 700105 HCHG RX REV CODE 258: Performed by: HOSPITALIST

## 2023-04-17 PROCEDURE — 83036 HEMOGLOBIN GLYCOSYLATED A1C: CPT

## 2023-04-17 PROCEDURE — 87205 SMEAR GRAM STAIN: CPT

## 2023-04-17 PROCEDURE — 700111 HCHG RX REV CODE 636 W/ 250 OVERRIDE (IP)

## 2023-04-17 PROCEDURE — 89051 BODY FLUID CELL COUNT: CPT

## 2023-04-17 PROCEDURE — 94669 MECHANICAL CHEST WALL OSCILL: CPT

## 2023-04-17 PROCEDURE — 87070 CULTURE OTHR SPECIMN AEROBIC: CPT

## 2023-04-17 PROCEDURE — 700101 HCHG RX REV CODE 250: Performed by: INTERNAL MEDICINE

## 2023-04-17 PROCEDURE — C1729 CATH, DRAINAGE: HCPCS | Performed by: INTERNAL MEDICINE

## 2023-04-17 PROCEDURE — 99232 SBSQ HOSP IP/OBS MODERATE 35: CPT | Performed by: NURSE PRACTITIONER

## 2023-04-17 PROCEDURE — 82962 GLUCOSE BLOOD TEST: CPT | Mod: 91

## 2023-04-17 PROCEDURE — 700111 HCHG RX REV CODE 636 W/ 250 OVERRIDE (IP): Performed by: INTERNAL MEDICINE

## 2023-04-17 PROCEDURE — 99232 SBSQ HOSP IP/OBS MODERATE 35: CPT | Mod: FS | Performed by: STUDENT IN AN ORGANIZED HEALTH CARE EDUCATION/TRAINING PROGRAM

## 2023-04-17 PROCEDURE — 770001 HCHG ROOM/CARE - MED/SURG/GYN PRIV*

## 2023-04-17 PROCEDURE — 32561 LYSE CHEST FIBRIN INIT DAY: CPT | Performed by: INTERNAL MEDICINE

## 2023-04-17 PROCEDURE — 85027 COMPLETE CBC AUTOMATED: CPT

## 2023-04-17 RX ORDER — ONDANSETRON 2 MG/ML
4 INJECTION INTRAMUSCULAR; INTRAVENOUS PRN
Status: ACTIVE | OUTPATIENT
Start: 2023-04-17 | End: 2023-04-17

## 2023-04-17 RX ORDER — MIDAZOLAM HYDROCHLORIDE 1 MG/ML
INJECTION INTRAMUSCULAR; INTRAVENOUS
Status: COMPLETED
Start: 2023-04-17 | End: 2023-04-17

## 2023-04-17 RX ORDER — SODIUM CHLORIDE 9 MG/ML
500 INJECTION, SOLUTION INTRAVENOUS
Status: ACTIVE | OUTPATIENT
Start: 2023-04-17 | End: 2023-04-17

## 2023-04-17 RX ORDER — MIDAZOLAM HYDROCHLORIDE 1 MG/ML
.5-2 INJECTION INTRAMUSCULAR; INTRAVENOUS PRN
Status: ACTIVE | OUTPATIENT
Start: 2023-04-17 | End: 2023-04-17

## 2023-04-17 RX ADMIN — DORNASE ALFA 5 MG: 1 SOLUTION RESPIRATORY (INHALATION) at 14:31

## 2023-04-17 RX ADMIN — MIDAZOLAM HYDROCHLORIDE 1 MG: 1 INJECTION, SOLUTION INTRAMUSCULAR; INTRAVENOUS at 08:58

## 2023-04-17 RX ADMIN — ACETAMINOPHEN 650 MG: 325 TABLET, FILM COATED ORAL at 17:30

## 2023-04-17 RX ADMIN — GUAIFENESIN 600 MG: 600 TABLET, EXTENDED RELEASE ORAL at 17:30

## 2023-04-17 RX ADMIN — PIPERACILLIN AND TAZOBACTAM 4.5 G: 4; .5 INJECTION, POWDER, LYOPHILIZED, FOR SOLUTION INTRAVENOUS; PARENTERAL at 14:05

## 2023-04-17 RX ADMIN — KETOROLAC TROMETHAMINE 30 MG: 30 INJECTION, SOLUTION INTRAMUSCULAR; INTRAVENOUS at 21:48

## 2023-04-17 RX ADMIN — KETOROLAC TROMETHAMINE 30 MG: 30 INJECTION, SOLUTION INTRAMUSCULAR; INTRAVENOUS at 05:12

## 2023-04-17 RX ADMIN — OXYCODONE 5 MG: 5 TABLET ORAL at 19:55

## 2023-04-17 RX ADMIN — DOCUSATE SODIUM 50 MG AND SENNOSIDES 8.6 MG 2 TABLET: 8.6; 5 TABLET, FILM COATED ORAL at 05:01

## 2023-04-17 RX ADMIN — FENTANYL CITRATE 50 MCG: 50 INJECTION, SOLUTION INTRAMUSCULAR; INTRAVENOUS at 09:03

## 2023-04-17 RX ADMIN — INSULIN GLARGINE-YFGN 26 UNITS: 100 INJECTION, SOLUTION SUBCUTANEOUS at 17:33

## 2023-04-17 RX ADMIN — FENTANYL CITRATE 50 MCG: 50 INJECTION, SOLUTION INTRAMUSCULAR; INTRAVENOUS at 08:58

## 2023-04-17 RX ADMIN — INSULIN HUMAN 3 UNITS: 100 INJECTION, SOLUTION PARENTERAL at 20:04

## 2023-04-17 RX ADMIN — PIPERACILLIN AND TAZOBACTAM 4.5 G: 4; .5 INJECTION, POWDER, LYOPHILIZED, FOR SOLUTION INTRAVENOUS; PARENTERAL at 05:02

## 2023-04-17 RX ADMIN — DOCUSATE SODIUM 50 MG AND SENNOSIDES 8.6 MG 2 TABLET: 8.6; 5 TABLET, FILM COATED ORAL at 17:30

## 2023-04-17 RX ADMIN — OXYCODONE 5 MG: 5 TABLET ORAL at 15:09

## 2023-04-17 RX ADMIN — INSULIN HUMAN 8 UNITS: 100 INJECTION, SOLUTION PARENTERAL at 13:28

## 2023-04-17 RX ADMIN — GUAIFENESIN 600 MG: 600 TABLET, EXTENDED RELEASE ORAL at 05:01

## 2023-04-17 RX ADMIN — MIDAZOLAM HYDROCHLORIDE 1 MG: 1 INJECTION, SOLUTION INTRAMUSCULAR; INTRAVENOUS at 09:00

## 2023-04-17 RX ADMIN — FENTANYL CITRATE 50 MCG: 50 INJECTION, SOLUTION INTRAMUSCULAR; INTRAVENOUS at 09:15

## 2023-04-17 RX ADMIN — PIPERACILLIN AND TAZOBACTAM 4.5 G: 4; .5 INJECTION, POWDER, LYOPHILIZED, FOR SOLUTION INTRAVENOUS; PARENTERAL at 21:56

## 2023-04-17 RX ADMIN — INSULIN HUMAN 5 UNITS: 100 INJECTION, SOLUTION PARENTERAL at 17:36

## 2023-04-17 RX ADMIN — ALTEPLASE 5 MG: KIT at 14:31

## 2023-04-17 ASSESSMENT — ENCOUNTER SYMPTOMS
SHORTNESS OF BREATH: 1
COUGH: 1
PALPITATIONS: 0
ABDOMINAL PAIN: 0
HEARTBURN: 0
DIARRHEA: 0
MUSCULOSKELETAL NEGATIVE: 1
VOMITING: 0
CONSTITUTIONAL NEGATIVE: 1
CHILLS: 0
SPUTUM PRODUCTION: 1
PSYCHIATRIC NEGATIVE: 1
NAUSEA: 0
FEVER: 1
HEADACHES: 0
DIZZINESS: 0

## 2023-04-17 ASSESSMENT — PAIN DESCRIPTION - PAIN TYPE
TYPE: ACUTE PAIN

## 2023-04-17 NOTE — CARE PLAN
The patient is Stable - Low risk of patient condition declining or worsening    Shift Goals  Clinical Goals: Chest tube, wean O2  Patient Goals: Rest    Progress made toward(s) clinical / shift goals:  Pt resting. Ct placed by IR to -20 mmHg. No leak. O2 still at 3LNC at this time.    Patient is not progressing towards the following goals:

## 2023-04-17 NOTE — PROGRESS NOTES
[facility] Medicine Daily Progress Note    Date of Service  4/17/2023    Chief Complaint  Jc Ramos is a 42 y.o. female admitted 4/15/2023 with shortness of breath, pleuritic chest pain, productive green cough and fevers.    Hospital Course  Jc Ramos is a 42 y.o. 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  Patient was seen in the emergency room 3 days prior with similar symptoms and discharged with amoxicillin and azithromycin.  Patient states she was compliant with these antibiotics.  Patient's symptoms persisted that prompted her to come to emergency room.  Patient denies alcohol abuse.     EKG found sinus tachycardia without ST segment changes  Chest x-ray found right-sided infiltrate and right-sided pleural effusion  CTA of the chest found moderate loculated right pleural effusion with right middle and right lower lobe pneumonia.  Surgery consulted and recommend IR drain empyema protocol.    Interval Problem Update  4/16 afebrile, vitals are stable, on 2 L liters nasal cannula.  White count is trending down.  Sodium is improving.  Alk phos is trending down.  Patient is n.p.o. for chest tube placement today.    4/17 afebrile, vitals stable, on 2-3L nasal canula. Sodium improved today.  IR placed chest tube today.  Patient is tolerating it well.  Day 1 of lytic therapy.  Blood cultures NGTD, on Zosyn.  Patient resting comfortably no complaints.  Urine culture growing E. coli, however patient is asymptomatic and she is already on Zosyn.  No further treatment.    I have discussed this patient's plan of care and discharge plan at IDT rounds today with Case Management, Nursing, Nursing leadership, and other members of the IDT team.    Consultants/Specialty  general surgery    Code Status  Full Code    Disposition  Patient is not  medically cleared for discharge.   Anticipate discharge to to home with close outpatient follow-up.  I have placed the appropriate orders for post-discharge needs.    Review of Systems  Review of Systems   Constitutional:  Positive for fever. Negative for chills and malaise/fatigue.   Respiratory:  Positive for cough, sputum production and shortness of breath.    Cardiovascular:  Negative for chest pain, palpitations and leg swelling.   Gastrointestinal:  Negative for abdominal pain, diarrhea, heartburn, nausea and vomiting.   Genitourinary:  Negative for dysuria, frequency and urgency.   Neurological:  Negative for dizziness and headaches.   All other systems reviewed and are negative.     Physical Exam  Temp:  [36.5 °C (97.7 °F)-37.1 °C (98.8 °F)] 36.9 °C (98.4 °F)  Pulse:  [] 95  Resp:  [12-24] 17  BP: (114-160)/(57-89) 139/79  SpO2:  [92 %-98 %] 95 %    Physical Exam  Vitals and nursing note reviewed.   Constitutional:       Appearance: Normal appearance. She is not ill-appearing.   HENT:      Head: Normocephalic and atraumatic.      Jaw: There is normal jaw occlusion.      Right Ear: Hearing normal.      Left Ear: Hearing normal.      Nose: Nose normal.      Mouth/Throat:      Lips: Pink.      Mouth: Mucous membranes are moist.   Eyes:      Extraocular Movements: Extraocular movements intact.      Conjunctiva/sclera: Conjunctivae normal.      Pupils: Pupils are equal, round, and reactive to light.   Neck:      Vascular: No carotid bruit.   Cardiovascular:      Rate and Rhythm: Normal rate and regular rhythm.      Pulses: Normal pulses.      Heart sounds: Normal heart sounds, S1 normal and S2 normal.   Pulmonary:      Effort: Pulmonary effort is normal.      Breath sounds: Decreased air movement present. No stridor. Examination of the right-lower field reveals decreased breath sounds. Examination of the left-lower field reveals decreased breath sounds. Decreased breath sounds present.   Chest:        Abdominal:      General: Bowel sounds are normal.      Palpations: Abdomen is soft.      Tenderness: There is no abdominal tenderness.   Musculoskeletal:      Cervical back: Normal range of motion and neck supple.      Right lower leg: No edema.      Left lower leg: No edema.   Skin:     General: Skin is warm and dry.      Capillary Refill: Capillary refill takes less than 2 seconds.   Neurological:      General: No focal deficit present.      Mental Status: She is alert and oriented to person, place, and time. Mental status is at baseline.      Sensory: Sensation is intact.      Motor: Motor function is intact.   Psychiatric:         Attention and Perception: Attention and perception normal.         Mood and Affect: Mood and affect normal.         Speech: Speech normal.         Behavior: Behavior normal. Behavior is cooperative.       Fluids    Intake/Output Summary (Last 24 hours) at 4/17/2023 1459  Last data filed at 4/17/2023 1009  Gross per 24 hour   Intake 60 ml   Output 40 ml   Net 20 ml       Laboratory  Recent Labs     04/15/23  2021 04/16/23  0125 04/17/23  0353   WBC 11.2* 10.7 8.2   RBC 4.24 3.79* 4.05*   HEMOGLOBIN 10.7* 9.7* 10.2*   HEMATOCRIT 35.3* 31.2* 33.9*   MCV 83.3 82.3 83.7   MCH 25.2* 25.6* 25.2*   MCHC 30.3* 31.1* 30.1*   RDW 43.4 42.7 44.5   PLATELETCT 312 268 282   MPV 10.4 11.0 10.4     Recent Labs     04/15/23  2021 04/16/23  0125 04/17/23  0353   SODIUM 127* 131* 133*   POTASSIUM 4.1 3.8 4.3   CHLORIDE 95* 98 99   CO2 22 24 22   GLUCOSE 257* 227* 221*   BUN 4* 4* 4*   CREATININE 0.49* 0.49* 0.62   CALCIUM 9.2 8.9 9.1     Recent Labs     04/16/23 0125   APTT 26.7   INR 1.11               Imaging  IR-CHEST TUBE-EMPYEMA RIGHT   Final Result         1. Ultrasound and fluoroscopy guided  Right  12 [place] Pigtail Chest Tube Placement For Possible Empyema Versus Loculated Pleural Effusion.         CT-CTA CHEST PULMONARY ARTERY W/ RECONS   Final Result         1. No CT evidence of  pulmonary embolism.      2. Moderate loculated right pleural effusion.      3. Patchy airspace opacity in the right middle lobe and right lower lobe, likely pneumonia.      DX-CHEST-PORTABLE (1 VIEW)   Final Result         Worsening airspace opacity in the right lower lung.   Loculated right pleural effusion, worse than prior.           Assessment/Plan  * Parapneumonic effusion- (present on admission)  Assessment & Plan  Found on CT scan  Patient does have a underlying history of cirrhosis  Check surgical cultures  Start IV antibiotics  Surgery has been consulted and recommend IR drain empyema protocol  4/17 radiology placed chest tube and day 1 of lytic therapy was performed.    Pneumonia  Assessment & Plan  Found to have right sided infiltrate  Patient failed outpatient antibiotics and now started on Zosyn and vancomycin follow trough levels  Follow up sputum and blood cultures  Respiratory care protocol   Placed on o2 therapy       Acute respiratory failure with hypoxia (HCC)  Assessment & Plan  On 4 L of O2 above baseline    Candiduria  Assessment & Plan  Start fluconazole for 7 days    Cirrhosis (HCC)  Assessment & Plan  Monitor volume status  Check hepatitis panel, HIV came, JENNY, anti-smooth muscle antibody  Check INR and ammonia  Patient denies alcohol abuse    Hyponatremia- (present on admission)  Assessment & Plan  Unknown etiology-possibly hypervolemic hyponatremia from underlying cirrhosis  I will hold IV Lasix for now  4/17 Na improved, will recheck again in AM    Type 2 diabetes mellitus with hyperglycemia, without long-term current use of insulin (HCC)- (present on admission)  Assessment & Plan  Start on insulin sliding scale with serial Accu-Checks  Check hemoglobin A1c= 10.1- ordered DM education, discussed with patient.  Hypoglycemic protocol in place  4/17 sugars remain high, added Lantus        Tobacco abuse- (present on admission)  Assessment & Plan  Tobacco cessation education provided for more  than 5 minutes.  We discussed the risks of smoking including increased risk of heart disease, stroke, cancer and COPD. We discussed the benefits of quitting smoking. We discussed options of nicotine patch, wellbutrin and chantix.  The patient has the intention to quit smoking. Nicotine replacement protocol will be provided to the patient.           VTE prophylaxis: SCDs/TEDs    I have performed a physical exam and reviewed and updated ROS and Plan today (4/17/2023). In review of yesterday's note (4/16/2023), there are no changes except as documented above.      Time spent: 16 minutes

## 2023-04-17 NOTE — PROGRESS NOTES
Pt presents to IR 2. Pt was consented by MD at bedside, confirmed by this RN and consent at bedside. Pt transferred to IR table in supine position. Patient underwent a right chest tube placement by Dr. Ward. Procedure site was marked by MD and verified using imaging guidance. Pt placed on monitor, prepped and draped in a sterile fashion. Vitals were taken every 5 minutes and remained stable during procedure (see doc flow sheet for results). CO2 waveform capnography was monitored and remained WNL throughout procedure. Report called to Stephy SAUER. Pt transported by mary with RN to [facility].        [employer] APDL Locking Pigtail 12F x 25cm  REF: [id number]  LOT: [id number]  Exp: 10/20/2025

## 2023-04-17 NOTE — PROGRESS NOTES
Bedside report received from night shift nurse. Assumed care at 0645.   Pt A&Ox4.  Tolerating NPO diet, denies n/v. Hypoactive bowel sounds, passing flatus, LBM 4/17/23. IV access through 18g EMILY that is running zosyn.  Saturating >90% on 3L NC.  Pt ambulates SBA.  Pain is controlled through medication orders. Updated on plan of care. Safety education provided. Bed locked in low. Call light within reach. Rounding in place.

## 2023-04-17 NOTE — PROGRESS NOTES
"    DATE: 4/17/2023    Hospital Day 2 Parapneumonic effusion and pneumonia ..    INTERVAL EVENTS:  IR chest tube placed for parapneumonic effusion/empyema   Resume diet  Lytics today by pulmonology - discussed with Dr. Layne   Cell count and glucose and culture pending on pleural fluid  Zosyn    REVIEW OF SYSTEMS:  Review of Systems   Constitutional: Negative.    Respiratory:  Positive for cough, sputum production and shortness of breath.    Gastrointestinal:  Negative for abdominal pain, nausea and vomiting.   Genitourinary:         Voidng   Musculoskeletal: Negative.    Psychiatric/Behavioral: Negative.     All other systems reviewed and are negative.    PHYSICAL EXAMINATION:  Vital Signs: BP (!) 144/76   Pulse 96   Temp 36.6 °C (97.9 °F) (Temporal)   Resp 17   Ht 1.727 m (5' 8\")   Wt (!) 128 kg (281 lb 12 oz)   SpO2 96%   Physical Exam  Vitals and nursing note reviewed.   Constitutional:       General: She is not in acute distress.     Appearance: She is not ill-appearing.   HENT:      Mouth/Throat:      Mouth: Mucous membranes are moist.      Pharynx: Oropharynx is clear.   Eyes:      General:         Right eye: No discharge.         Left eye: No discharge.      Extraocular Movements: Extraocular movements intact.   Pulmonary:      Effort: Pulmonary effort is normal. No respiratory distress.      Comments: Diminished right lung   CT placed by IR   Abdominal:      General: There is no distension.      Palpations: Abdomen is soft.      Tenderness: There is no abdominal tenderness.   Skin:     General: Skin is warm and dry.      Capillary Refill: Capillary refill takes less than 2 seconds.   Neurological:      Mental Status: She is alert and oriented to person, place, and time.   Psychiatric:         Behavior: Behavior normal.         Thought Content: Thought content normal.     ASSESSMENT AND PLAN:   * Parapneumonic effusion- (present on admission)  Assessment & Plan  Recommend IR for intervention  ABX per " medicine  MAYCOL carson        Discussed patient condition with RN, Patient, and Dr. Flores .

## 2023-04-17 NOTE — OR SURGEON
Immediate Post- Operative Note        Findings: Right pleural effusion, Right chest tube placed.      Procedure(s): Right chest tube placement for loculated effusion/empyema.      Estimated Blood Loss: Less than 5 ml      Complications: None        4/17/2023     9:16 AM     Daniel Ward M.D.

## 2023-04-17 NOTE — CARE PLAN
The patient is Stable - Low risk of patient condition declining or worsening    Shift Goals  Clinical Goals: npo 0000  Patient Goals: rest    Progress made toward(s) clinical / shift goals:    Problem: Knowledge Deficit - Standard  Goal: Patient and family/care givers will demonstrate understanding of plan of care, disease process/condition, diagnostic tests and medications  Outcome: Progressing     Problem: Respiratory  Goal: Patient will achieve/maintain optimum respiratory ventilation and gas exchange  Outcome: Progressing     Problem: Pain - Standard  Goal: Alleviation of pain or a reduction in pain to the patient’s comfort goal  Outcome: Progressing

## 2023-04-17 NOTE — PROCEDURES
Procedure Date: 4/17/2023    Procedure: Intrapleual Fibrinolytics (95705)    Physician: Jennifer Mendez MD RD    Anesthesia Type: N/A    Indication: complicated parapneumonic pleural effusion    Time Out: Patient was identified with two patient identifiers and site was confirmed.    Pre-Op Dx: complicated parapneumonic pleural effusion    Post-Op Dx: complicated parapneumonic pleural effusion    Medications:   Alteplase 5mg  Dornase Alpha 5mg     Description:    5mg of Alteplase and 5mg of Dornase were prepared by pharmacy in normal saline and maintained chilled until administration.  These solutions were mixed at bedside and the total fluid instillation was 50cc.  Patient was positioned in supine position.  Chest tube connections were prepped and cleaned. Alteplase/Dornase solution was drawn up in the appropriate syringe and medications were then injected into the chest tube, avoiding the introduction of air. Chest tube connection to pleurvac was then reconnected. Chest tube was left clamped/closed. Patient was instructed to lie in lateral position x 2 hours (60 minutes on the right and 60 minutes on the left).  Chest tube will be placed back on suction in 2 hour.    RN notified.     Complications: none    F/U: routine chest tube care    Jennifer Mendez MD RD  Pulmonary and Critical Care    Available on Voalte

## 2023-04-18 ENCOUNTER — APPOINTMENT (OUTPATIENT)
Dept: RADIOLOGY | Facility: MEDICAL CENTER | Age: 43
DRG: 871 | End: 2023-04-18
Attending: SURGERY
Payer: MEDICAID

## 2023-04-18 LAB
ALBUMIN SERPL BCP-MCNC: 2.6 G/DL (ref 3.2–4.9)
ALBUMIN/GLOB SERPL: 0.7 G/DL
ALP SERPL-CCNC: 99 U/L (ref 30–99)
ALT SERPL-CCNC: 11 U/L (ref 2–50)
ANION GAP SERPL CALC-SCNC: 8 MMOL/L (ref 7–16)
AST SERPL-CCNC: 9 U/L (ref 12–45)
BACTERIA UR CULT: ABNORMAL
BACTERIA UR CULT: ABNORMAL
BILIRUB SERPL-MCNC: 0.2 MG/DL (ref 0.1–1.5)
BUN SERPL-MCNC: 8 MG/DL (ref 8–22)
CALCIUM ALBUM COR SERPL-MCNC: 9.8 MG/DL (ref 8.5–10.5)
CALCIUM SERPL-MCNC: 8.7 MG/DL (ref 8.5–10.5)
CHLORIDE SERPL-SCNC: 100 MMOL/L (ref 96–112)
CO2 SERPL-SCNC: 27 MMOL/L (ref 20–33)
CREAT SERPL-MCNC: 0.53 MG/DL (ref 0.5–1.4)
ERYTHROCYTE [DISTWIDTH] IN BLOOD BY AUTOMATED COUNT: 43.2 FL (ref 35.9–50)
GFR SERPLBLD CREATININE-BSD FMLA CKD-EPI: 118 ML/MIN/1.73 M 2
GLOBULIN SER CALC-MCNC: 3.6 G/DL (ref 1.9–3.5)
GLUCOSE BLD STRIP.AUTO-MCNC: 226 MG/DL (ref 65–99)
GLUCOSE BLD STRIP.AUTO-MCNC: 261 MG/DL (ref 65–99)
GLUCOSE BLD STRIP.AUTO-MCNC: 266 MG/DL (ref 65–99)
GLUCOSE BLD STRIP.AUTO-MCNC: 345 MG/DL (ref 65–99)
GLUCOSE SERPL-MCNC: 212 MG/DL (ref 65–99)
HCT VFR BLD AUTO: 29.1 % (ref 37–47)
HGB BLD-MCNC: 9.1 G/DL (ref 12–16)
MCH RBC QN AUTO: 25.5 PG (ref 27–33)
MCHC RBC AUTO-ENTMCNC: 31.3 G/DL (ref 33.6–35)
MCV RBC AUTO: 81.5 FL (ref 81.4–97.8)
NUCLEAR IGG SER QL IA: NORMAL
PLATELET # BLD AUTO: 267 K/UL (ref 164–446)
PMV BLD AUTO: 10.4 FL (ref 9–12.9)
POTASSIUM SERPL-SCNC: 4.4 MMOL/L (ref 3.6–5.5)
PROT SERPL-MCNC: 6.2 G/DL (ref 6–8.2)
RBC # BLD AUTO: 3.57 M/UL (ref 4.2–5.4)
SIGNIFICANT IND 70042: ABNORMAL
SITE SITE: ABNORMAL
SMA IGG SER-ACNC: 10 UNITS (ref 0–19)
SODIUM SERPL-SCNC: 135 MMOL/L (ref 135–145)
SOURCE SOURCE: ABNORMAL
WBC # BLD AUTO: 8 K/UL (ref 4.8–10.8)

## 2023-04-18 PROCEDURE — 82962 GLUCOSE BLOOD TEST: CPT | Mod: 91

## 2023-04-18 PROCEDURE — 99232 SBSQ HOSP IP/OBS MODERATE 35: CPT | Mod: 25 | Performed by: INTERNAL MEDICINE

## 2023-04-18 PROCEDURE — 770001 HCHG ROOM/CARE - MED/SURG/GYN PRIV*

## 2023-04-18 PROCEDURE — 85027 COMPLETE CBC AUTOMATED: CPT

## 2023-04-18 PROCEDURE — 36415 COLL VENOUS BLD VENIPUNCTURE: CPT

## 2023-04-18 PROCEDURE — 700102 HCHG RX REV CODE 250 W/ 637 OVERRIDE(OP): Performed by: HOSPITALIST

## 2023-04-18 PROCEDURE — A9270 NON-COVERED ITEM OR SERVICE: HCPCS | Performed by: HOSPITALIST

## 2023-04-18 PROCEDURE — 71045 X-RAY EXAM CHEST 1 VIEW: CPT

## 2023-04-18 PROCEDURE — 99232 SBSQ HOSP IP/OBS MODERATE 35: CPT | Performed by: INTERNAL MEDICINE

## 2023-04-18 PROCEDURE — 32562 LYSE CHEST FIBRIN SUBQ DAY: CPT | Performed by: INTERNAL MEDICINE

## 2023-04-18 PROCEDURE — 700105 HCHG RX REV CODE 258: Performed by: INTERNAL MEDICINE

## 2023-04-18 PROCEDURE — 700111 HCHG RX REV CODE 636 W/ 250 OVERRIDE (IP): Performed by: HOSPITALIST

## 2023-04-18 PROCEDURE — 80053 COMPREHEN METABOLIC PANEL: CPT

## 2023-04-18 PROCEDURE — 700102 HCHG RX REV CODE 250 W/ 637 OVERRIDE(OP): Performed by: INTERNAL MEDICINE

## 2023-04-18 PROCEDURE — 700101 HCHG RX REV CODE 250: Performed by: INTERNAL MEDICINE

## 2023-04-18 PROCEDURE — A9270 NON-COVERED ITEM OR SERVICE: HCPCS | Performed by: INTERNAL MEDICINE

## 2023-04-18 PROCEDURE — 700105 HCHG RX REV CODE 258: Performed by: HOSPITALIST

## 2023-04-18 PROCEDURE — 700111 HCHG RX REV CODE 636 W/ 250 OVERRIDE (IP): Performed by: INTERNAL MEDICINE

## 2023-04-18 PROCEDURE — 94669 MECHANICAL CHEST WALL OSCILL: CPT

## 2023-04-18 PROCEDURE — 3E0L3GC INTRODUCTION OF OTHER THERAPEUTIC SUBSTANCE INTO PLEURAL CAVITY, PERCUTANEOUS APPROACH: ICD-10-PCS | Performed by: INTERNAL MEDICINE

## 2023-04-18 RX ORDER — TRAZODONE HYDROCHLORIDE 50 MG/1
50 TABLET ORAL
Status: DISCONTINUED | OUTPATIENT
Start: 2023-04-18 | End: 2023-04-21 | Stop reason: HOSPADM

## 2023-04-18 RX ORDER — CHOLECALCIFEROL (VITAMIN D3) 125 MCG
5 CAPSULE ORAL NIGHTLY
Status: DISCONTINUED | OUTPATIENT
Start: 2023-04-18 | End: 2023-04-21 | Stop reason: HOSPADM

## 2023-04-18 RX ADMIN — ACETAMINOPHEN 650 MG: 325 TABLET, FILM COATED ORAL at 04:52

## 2023-04-18 RX ADMIN — Medication 5 MG: at 21:53

## 2023-04-18 RX ADMIN — KETOROLAC TROMETHAMINE 30 MG: 30 INJECTION, SOLUTION INTRAMUSCULAR; INTRAVENOUS at 18:14

## 2023-04-18 RX ADMIN — KETOROLAC TROMETHAMINE 30 MG: 30 INJECTION, SOLUTION INTRAMUSCULAR; INTRAVENOUS at 11:39

## 2023-04-18 RX ADMIN — INSULIN HUMAN 3 UNITS: 100 INJECTION, SOLUTION PARENTERAL at 09:47

## 2023-04-18 RX ADMIN — PIPERACILLIN AND TAZOBACTAM 4.5 G: 4; .5 INJECTION, POWDER, LYOPHILIZED, FOR SOLUTION INTRAVENOUS; PARENTERAL at 04:56

## 2023-04-18 RX ADMIN — INSULIN HUMAN 5 UNITS: 100 INJECTION, SOLUTION PARENTERAL at 13:03

## 2023-04-18 RX ADMIN — ALTEPLASE 5 MG: KIT at 15:00

## 2023-04-18 RX ADMIN — INSULIN HUMAN 6 UNITS: 100 INJECTION, SOLUTION PARENTERAL at 21:59

## 2023-04-18 RX ADMIN — DOCUSATE SODIUM 50 MG AND SENNOSIDES 8.6 MG 2 TABLET: 8.6; 5 TABLET, FILM COATED ORAL at 16:23

## 2023-04-18 RX ADMIN — DORNASE ALFA 5 MG: 1 SOLUTION RESPIRATORY (INHALATION) at 09:00

## 2023-04-18 RX ADMIN — TRAZODONE HYDROCHLORIDE 50 MG: 50 TABLET ORAL at 21:52

## 2023-04-18 RX ADMIN — OXYCODONE 5 MG: 5 TABLET ORAL at 16:23

## 2023-04-18 RX ADMIN — DOCUSATE SODIUM 50 MG AND SENNOSIDES 8.6 MG 2 TABLET: 8.6; 5 TABLET, FILM COATED ORAL at 04:52

## 2023-04-18 RX ADMIN — PIPERACILLIN AND TAZOBACTAM 4.5 G: 4; .5 INJECTION, POWDER, LYOPHILIZED, FOR SOLUTION INTRAVENOUS; PARENTERAL at 13:57

## 2023-04-18 RX ADMIN — DORNASE ALFA 5 MG: 1 SOLUTION RESPIRATORY (INHALATION) at 15:00

## 2023-04-18 RX ADMIN — KETOROLAC TROMETHAMINE 30 MG: 30 INJECTION, SOLUTION INTRAMUSCULAR; INTRAVENOUS at 04:52

## 2023-04-18 RX ADMIN — ALTEPLASE 5 MG: KIT at 09:00

## 2023-04-18 RX ADMIN — PIPERACILLIN AND TAZOBACTAM 4.5 G: 4; .5 INJECTION, POWDER, LYOPHILIZED, FOR SOLUTION INTRAVENOUS; PARENTERAL at 21:55

## 2023-04-18 RX ADMIN — OXYCODONE 5 MG: 5 TABLET ORAL at 21:52

## 2023-04-18 RX ADMIN — GUAIFENESIN 600 MG: 600 TABLET, EXTENDED RELEASE ORAL at 04:52

## 2023-04-18 RX ADMIN — GUAIFENESIN 600 MG: 600 TABLET, EXTENDED RELEASE ORAL at 16:23

## 2023-04-18 RX ADMIN — INSULIN HUMAN 5 UNITS: 100 INJECTION, SOLUTION PARENTERAL at 18:11

## 2023-04-18 ASSESSMENT — PAIN DESCRIPTION - PAIN TYPE
TYPE: ACUTE PAIN

## 2023-04-18 ASSESSMENT — ENCOUNTER SYMPTOMS
WEAKNESS: 0
SHORTNESS OF BREATH: 1
COUGH: 1
FEVER: 1
DIZZINESS: 0
ABDOMINAL PAIN: 0
NAUSEA: 0
HEARTBURN: 0
INSOMNIA: 1
MYALGIAS: 1
HEADACHES: 0
SPUTUM PRODUCTION: 1
PALPITATIONS: 0

## 2023-04-18 NOTE — DIETARY
Nutrition Services: Diabetes Education Consult   Day 3 of admit.  Jc Ramos is a 42 y.o. female with admitting DX of Parapneumonic effusion     RD able to visit pt at bedside to discuss diabetes education. RD discussed consistent CHO diet, however pt declined full education. RD provided Type 2 diabetes education booklet reinforcing topics discussed. Pt demonstrated appropriate readiness and some evidence of learning. RD able to answer all questions to patient's satisfaction.     No other education needs identified at this time. Consider referral to outpatient nutrition services for continuation of education as indicated or per pt preferences.     Please re-consult RD as indicated.

## 2023-04-18 NOTE — CONSULTS
Pulmonary Consultation    Date of consult: 4/18/2023    Referring Physician  Syd Miller M.D.    Reason for Consultation  Pleural lytics     History of Presenting Illness  42 y.o. female who presented 4/15/2023 with a history of substance abuse (meth and tobacco) with productive cough, pleuritic chest pain and fevers for a week.  Surgery was consulted on admission and recommended an IR chest tube to drain lytics. Initially pleural fluid was not sent for studies but the surgery team was able to get the fluid sent after and it is consistent with a parapneumonic effusion.  Cultures pending.      Code Status  Full Code    Review of Systems  Review of Systems   Constitutional:  Positive for fever and malaise/fatigue.   HENT:  Positive for congestion.    Respiratory:  Positive for cough, sputum production and shortness of breath.    Cardiovascular:  Positive for chest pain. Negative for palpitations and leg swelling.   Gastrointestinal:  Negative for heartburn.   Neurological:  Negative for dizziness and headaches.     Past Medical History   has a past medical history of Diabetes.    Surgical History   has a past surgical history that includes cholecystectomy; pr c-sec only,prev c-sec; and irrigation & debridement ortho (Left, 8/24/2021).    Family History  family history includes Diabetes in her mother.    Social History   reports that she has been smoking cigarettes. She has been smoking an average of .5 packs per day. She has never used smokeless tobacco. She reports that she does not drink alcohol and does not use drugs.    Medications  Home Medications       Reviewed by Miriam Lozano (Pharmacy Tech) on 04/15/23 at 2326  Med List Status: Complete     Medication Last Dose Status   amoxicillin (AMOXIL) 500 MG Cap 4/15/2023 Active   azithromycin (ZITHROMAX) 250 MG Tab 4/15/2023 Active   metFORMIN (GLUCOPHAGE) 500 MG Tab 4/15/2023 Active                  Current Facility-Administered Medications   Medication Dose  Route Frequency Provider Last Rate Last Admin    alteplase (Activase) 5 mg in NS 30 mL INTRAPLEURAL syringe  5 mg Intrapleural TWICE DAILY Jennifer Mendez M.D.        And    dornase alpha (PULMOZYME) 5 mg in NS 30 mL INTRAPLEURAL syringe  5 mg Intrapleural TWICE DAILY Jennifer Mendez M.D.        melatonin tablet 5 mg  5 mg Oral Nightly Syd Miller M.D.        traZODone (DESYREL) tablet 50 mg  50 mg Oral QHS Syd Miller M.D.        insulin GLARGINE (Lantus,Semglee) injection  0.2 Units/kg/day Subcutaneous Q EVENING NE ReynoldsP.RCheyanneNCheyanne   26 Units at 04/17/23 1733    ketorolac (TORADOL) injection 30 mg  30 mg Intravenous Q6HRS PRN Mode Garcia M.D.   30 mg at 04/18/23 0452    oxyCODONE immediate-release (ROXICODONE) tablet 5 mg  5 mg Oral Q4HRS PRN Mode Garcia M.D.   5 mg at 04/17/23 1955    piperacillin-tazobactam (Zosyn) 4.5 g in  mL IVPB  4.5 g Intravenous Q8HRS Mode Garcia M.D.   Stopped at 04/18/23 0856    insulin regular (HumuLIN R,NovoLIN R) injection  2-9 Units Subcutaneous 4X/DAY ACHS Mode Garcia M.D.   3 Units at 04/18/23 0947    And    dextrose 10 % BOLUS 25 g  25 g Intravenous Q15 MIN PRN Mode Garcia M.D.        guaiFENesin ER (MUCINEX) tablet 600 mg  600 mg Oral Q12HRS Mode Garcia M.D.   600 mg at 04/18/23 0452    senna-docusate (PERICOLACE or SENOKOT S) 8.6-50 MG per tablet 2 Tablet  2 Tablet Oral BID Mode Garcia M.D.   2 Tablet at 04/18/23 0452    And    polyethylene glycol/lytes (MIRALAX) PACKET 1 Packet  1 Packet Oral QDAY PRN Mode Garcia M.D.        And    magnesium hydroxide (MILK OF MAGNESIA) suspension 30 mL  30 mL Oral QDAY PRN Mode Garcia M.D.        And    bisacodyl (DULCOLAX) suppository 10 mg  10 mg Rectal QDAY PRN Mode Garcia M.D.        Respiratory Therapy Consult   Nebulization Continuous RT Mode Garcia M.D.        acetaminophen (Tylenol) tablet 650 mg  650 mg Oral Q6HRS PRN Mode Garcia M.D.   650 mg at 04/18/23 0452    labetalol  (NORMODYNE/TRANDATE) injection 10 mg  10 mg Intravenous Q4HRS PRN Mode Garcia M.D.        ondansetron (ZOFRAN) syringe/vial injection 4 mg  4 mg Intravenous Q4HRS PRN Mode Garcia M.D.        ondansetron (ZOFRAN ODT) dispertab 4 mg  4 mg Oral Q4HRS PRN Mode Garcia M.D.        promethazine (PHENERGAN) tablet 12.5-25 mg  12.5-25 mg Oral Q4HRS PRN Mode Garcia M.D.        promethazine (PHENERGAN) suppository 12.5-25 mg  12.5-25 mg Rectal Q4HRS PRTODD Garcia M.D.        prochlorperazine (COMPAZINE) injection 5-10 mg  5-10 mg Intravenous Q4HRS PRTODD Garcia M.D.        guaiFENesin dextromethorphan (ROBITUSSIN DM) 100-10 MG/5ML syrup 10 mL  10 mL Oral Q6HRS PRN Mode Garcia M.D.           Allergies  Allergies   Allergen Reactions    Hydrocodone Vomiting and Nausea       Vital Signs last 24 hours  Temp:  [36.4 °C (97.5 °F)-37.3 °C (99.1 °F)] 36.5 °C (97.7 °F)  Pulse:  [80-97] 80  Resp:  [14-18] 14  BP: (117-143)/(66-80) 127/73  SpO2:  [93 %-98 %] 94 %    Physical Exam  Physical Exam  Constitutional:       Appearance: She is ill-appearing.   HENT:      Head: Atraumatic.   Cardiovascular:      Rate and Rhythm: Normal rate and regular rhythm.      Heart sounds: Normal heart sounds.   Pulmonary:      Effort: Pulmonary effort is normal.      Breath sounds: Rales present.      Comments: Diminished at right base  Abdominal:      Palpations: Abdomen is soft.   Musculoskeletal:         General: No swelling, tenderness or deformity.   Neurological:      General: No focal deficit present.      Mental Status: She is alert and oriented to person, place, and time.       Fluids    Intake/Output Summary (Last 24 hours) at 4/18/2023 1052  Last data filed at 4/18/2023 0330  Gross per 24 hour   Intake --   Output 111 ml   Net -111 ml       Laboratory  Recent Results (from the past 48 hour(s))   POCT glucose device results    Collection Time: 04/16/23  2:01 PM   Result Value Ref Range    POC Glucose, Blood 296 (H) 65 - 99 mg/dL    POCT glucose device results    Collection Time: 04/16/23  6:22 PM   Result Value Ref Range    POC Glucose, Blood 294 (H) 65 - 99 mg/dL   POCT glucose device results    Collection Time: 04/16/23 10:00 PM   Result Value Ref Range    POC Glucose, Blood 308 (H) 65 - 99 mg/dL   HEMOGLOBIN A1C    Collection Time: 04/17/23  3:53 AM   Result Value Ref Range    Glycohemoglobin 10.1 (H) 4.0 - 5.6 %    Est Avg Glucose 243 mg/dL   CBC WITHOUT DIFFERENTIAL    Collection Time: 04/17/23  3:53 AM   Result Value Ref Range    WBC 8.2 4.8 - 10.8 K/uL    RBC 4.05 (L) 4.20 - 5.40 M/uL    Hemoglobin 10.2 (L) 12.0 - 16.0 g/dL    Hematocrit 33.9 (L) 37.0 - 47.0 %    MCV 83.7 81.4 - 97.8 fL    MCH 25.2 (L) 27.0 - 33.0 pg    MCHC 30.1 (L) 33.6 - 35.0 g/dL    RDW 44.5 35.9 - 50.0 fL    Platelet Count 282 164 - 446 K/uL    MPV 10.4 9.0 - 12.9 fL   Comp Metabolic Panel    Collection Time: 04/17/23  3:53 AM   Result Value Ref Range    Sodium 133 (L) 135 - 145 mmol/L    Potassium 4.3 3.6 - 5.5 mmol/L    Chloride 99 96 - 112 mmol/L    Co2 22 20 - 33 mmol/L    Anion Gap 12.0 7.0 - 16.0    Glucose 221 (H) 65 - 99 mg/dL    Bun 4 (L) 8 - 22 mg/dL    Creatinine 0.62 0.50 - 1.40 mg/dL    Calcium 9.1 8.5 - 10.5 mg/dL    AST(SGOT) 9 (L) 12 - 45 U/L    ALT(SGPT) 7 2 - 50 U/L    Alkaline Phosphatase 106 (H) 30 - 99 U/L    Total Bilirubin 0.3 0.1 - 1.5 mg/dL    Albumin 2.6 (L) 3.2 - 4.9 g/dL    Total Protein 6.9 6.0 - 8.2 g/dL    Globulin 4.3 (H) 1.9 - 3.5 g/dL    A-G Ratio 0.6 g/dL   CORRECTED CALCIUM    Collection Time: 04/17/23  3:53 AM   Result Value Ref Range    Correct Calcium 10.2 8.5 - 10.5 mg/dL   ESTIMATED GFR    Collection Time: 04/17/23  3:53 AM   Result Value Ref Range    GFR (CKD-EPI) 113 >60 mL/min/1.73 m 2   HCG QUALITATIVE UR    Collection Time: 04/17/23  8:12 AM   Result Value Ref Range    Beta-Hcg Urine Negative Negative   POCT glucose device results    Collection Time: 04/17/23  9:54 AM   Result Value Ref Range    POC Glucose, Blood 173  (H) 65 - 99 mg/dL   FLUID GLUCOSE    Collection Time: 04/17/23 10:54 AM   Result Value Ref Range    Fluid Type Pleural     Glucose, Fluid 147 mg/dL   FLUID CULTURE W/GRAM STAIN    Collection Time: 04/17/23 11:36 AM    Specimen: Pleural Fluid; Body Fluid   Result Value Ref Range    Significant Indicator NEG     Source BF     Site PLEURAL FLUID     Culture Result -     Gram Stain Result Few WBCs.  No organisms seen.      Fluid Cell Count    Collection Time: 04/17/23 11:36 AM   Result Value Ref Range    Fluid Type Pleural     Color-Body Fluid Yellow     Character-Body Fluid Clear     Total RBC Count 2000 cells/uL    Total  cells/uL    Polys 92 %    Lymphs 8 %   GRAM STAIN    Collection Time: 04/17/23 11:36 AM    Specimen: Body Fluid   Result Value Ref Range    Significant Indicator .     Source BF     Site PLEURAL FLUID     Gram Stain Result Few WBCs.  No organisms seen.      POCT glucose device results    Collection Time: 04/17/23  1:26 PM   Result Value Ref Range    POC Glucose, Blood 373 (H) 65 - 99 mg/dL   POCT glucose device results    Collection Time: 04/17/23  5:33 PM   Result Value Ref Range    POC Glucose, Blood 277 (H) 65 - 99 mg/dL   POCT glucose device results    Collection Time: 04/17/23  8:03 PM   Result Value Ref Range    POC Glucose, Blood 235 (H) 65 - 99 mg/dL   CBC WITHOUT DIFFERENTIAL    Collection Time: 04/18/23  4:38 AM   Result Value Ref Range    WBC 8.0 4.8 - 10.8 K/uL    RBC 3.57 (L) 4.20 - 5.40 M/uL    Hemoglobin 9.1 (L) 12.0 - 16.0 g/dL    Hematocrit 29.1 (L) 37.0 - 47.0 %    MCV 81.5 81.4 - 97.8 fL    MCH 25.5 (L) 27.0 - 33.0 pg    MCHC 31.3 (L) 33.6 - 35.0 g/dL    RDW 43.2 35.9 - 50.0 fL    Platelet Count 267 164 - 446 K/uL    MPV 10.4 9.0 - 12.9 fL   Comp Metabolic Panel    Collection Time: 04/18/23  4:38 AM   Result Value Ref Range    Sodium 135 135 - 145 mmol/L    Potassium 4.4 3.6 - 5.5 mmol/L    Chloride 100 96 - 112 mmol/L    Co2 27 20 - 33 mmol/L    Anion Gap 8.0 7.0 - 16.0     Glucose 212 (H) 65 - 99 mg/dL    Bun 8 8 - 22 mg/dL    Creatinine 0.53 0.50 - 1.40 mg/dL    Calcium 8.7 8.5 - 10.5 mg/dL    AST(SGOT) 9 (L) 12 - 45 U/L    ALT(SGPT) 11 2 - 50 U/L    Alkaline Phosphatase 99 30 - 99 U/L    Total Bilirubin 0.2 0.1 - 1.5 mg/dL    Albumin 2.6 (L) 3.2 - 4.9 g/dL    Total Protein 6.2 6.0 - 8.2 g/dL    Globulin 3.6 (H) 1.9 - 3.5 g/dL    A-G Ratio 0.7 g/dL   CORRECTED CALCIUM    Collection Time: 04/18/23  4:38 AM   Result Value Ref Range    Correct Calcium 9.8 8.5 - 10.5 mg/dL   ESTIMATED GFR    Collection Time: 04/18/23  4:38 AM   Result Value Ref Range    GFR (CKD-EPI) 118 >60 mL/min/1.73 m 2   POCT glucose device results    Collection Time: 04/18/23  9:46 AM   Result Value Ref Range    POC Glucose, Blood 226 (H) 65 - 99 mg/dL       Imaging  DX-CHEST-LIMITED (1 VIEW)   Final Result      1.  Wedge-shaped opacity in right lung base consistent with atelectasis or pneumonitis.      2.  Blunting right costophrenic angle consistent with pleural effusion and/or pleural inflammation.      3.  Locking loop catheter overlying the right lung base with no evidence of pneumothorax.      IR-CHEST TUBE-EMPYEMA RIGHT   Final Result         1. Ultrasound and fluoroscopy guided  Right  12 Kinyarwanda Pigtail Chest Tube Placement For Possible Empyema Versus Loculated Pleural Effusion.         CT-CTA CHEST PULMONARY ARTERY W/ RECONS   Final Result         1. No CT evidence of pulmonary embolism.      2. Moderate loculated right pleural effusion.      3. Patchy airspace opacity in the right middle lobe and right lower lobe, likely pneumonia.      DX-CHEST-PORTABLE (1 VIEW)   Final Result         Worsening airspace opacity in the right lower lung.   Loculated right pleural effusion, worse than prior.          Assessment/Plan  #Parapneumonic effusion  Plan:  - Pleural cultures pending  - Sputum culture needs to be resent  - Lytics started 4/17, Today will be doses 2 and 3  - Plan for 6 total doses of lytic therapy    - Monitor I:O  - Daily CXR  - Pulmonary following for lytics, surgery to manage chest tube.     Jennifer Mendez MD RD  Pulmonary and Critical Care    Available on Voalte

## 2023-04-18 NOTE — PROGRESS NOTES
Bedside report received from night shift nurse. Assumed care at 0645.   Pt A&Ox4.  Tolerating CHO diet, denies n/v. Hypoactive bowel sounds, passing flatus, LBM 4/17/23. IV access through 18g EMILY that is running zosyn.  R CT to -20mmHg, CDI.   Saturating >90% on RA.  Pt ambulates SBA.  Pain is controlled through medication orders. Updated on plan of care. Safety education provided. Bed locked in low. Call light within reach. Rounding in place.

## 2023-04-18 NOTE — PROGRESS NOTES
Hospital Medicine Daily Progress Note    Date of Service  4/18/2023    Chief Complaint  Jc Ramos is a 42 y.o. female admitted 4/15/2023 with SOB    Hospital Course  41 yo woman with meth use, tobacco use who presented with SOB and cough despite recent oral treatment for pneumonia.  CT showed loculated right pleural effusion with pneumonia.  Surgery was consulted and had IR placed right chest tube 4/17.  Pulmonology was consulted.    Interval Problem Update  CT 151cc  Pleural fluid culture so far no growth.  Continue on Zosyn  Chest x-ray shows right chest tube in place with blunting of costophrenic angle and right lower lung opacity.  Day 2 of intrapleural fibrinolytic therapy with pulmonology  Hyperglycemic to 200s  Unable to sleep, ordered melatonin and trazodone.    I have discussed this patient's plan of care and discharge plan at IDT rounds today with Case Management, Nursing, Nursing leadership, and other members of the IDT team.    Consultants/Specialty  general surgery and pulmonary    Code Status  Full Code    Disposition  Patient is not medically cleared for discharge.   Anticipate discharge to to home with close outpatient follow-up.  I have placed the appropriate orders for post-discharge needs.    Review of Systems  Review of Systems   Constitutional:  Positive for malaise/fatigue.   Respiratory:  Positive for cough and shortness of breath.    Cardiovascular:  Positive for chest pain.   Gastrointestinal:  Negative for abdominal pain and nausea.   Musculoskeletal:  Positive for myalgias.   Neurological:  Negative for dizziness and weakness.   Psychiatric/Behavioral:  The patient has insomnia.       Physical Exam  Temp:  [36.4 °C (97.5 °F)-37.3 °C (99.1 °F)] 37 °C (98.6 °F)  Pulse:  [73-97] 96  Resp:  [14-18] 17  BP: (104-143)/(66-86) 142/86  SpO2:  [91 %-98 %] 91 %    Physical Exam  Vitals and nursing note reviewed.   Constitutional:       General: She is not in acute distress.     Appearance:  She is not toxic-appearing.   HENT:      Head: Normocephalic.      Mouth/Throat:      Mouth: Mucous membranes are moist.   Eyes:      General:         Right eye: No discharge.         Left eye: No discharge.   Cardiovascular:      Rate and Rhythm: Normal rate and regular rhythm.   Pulmonary:      Effort: No respiratory distress.      Breath sounds: Rales present. No wheezing.      Comments: Right-sided chest tube with poor air movement  Abdominal:      Palpations: Abdomen is soft.      Tenderness: There is no abdominal tenderness.   Musculoskeletal:         General: No swelling.      Cervical back: Neck supple.   Skin:     General: Skin is warm and dry.   Neurological:      Mental Status: She is alert and oriented to person, place, and time.       Fluids    Intake/Output Summary (Last 24 hours) at 4/18/2023 1624  Last data filed at 4/18/2023 1302  Gross per 24 hour   Intake --   Output 121 ml   Net -121 ml       Laboratory  Recent Labs     04/16/23  0125 04/17/23  0353 04/18/23  0438   WBC 10.7 8.2 8.0   RBC 3.79* 4.05* 3.57*   HEMOGLOBIN 9.7* 10.2* 9.1*   HEMATOCRIT 31.2* 33.9* 29.1*   MCV 82.3 83.7 81.5   MCH 25.6* 25.2* 25.5*   MCHC 31.1* 30.1* 31.3*   RDW 42.7 44.5 43.2   PLATELETCT 268 282 267   MPV 11.0 10.4 10.4     Recent Labs     04/16/23  0125 04/17/23  0353 04/18/23  0438   SODIUM 131* 133* 135   POTASSIUM 3.8 4.3 4.4   CHLORIDE 98 99 100   CO2 24 22 27   GLUCOSE 227* 221* 212*   BUN 4* 4* 8   CREATININE 0.49* 0.62 0.53   CALCIUM 8.9 9.1 8.7     Recent Labs     04/16/23  0125   APTT 26.7   INR 1.11               Imaging  DX-CHEST-LIMITED (1 VIEW)   Final Result      1.  Wedge-shaped opacity in right lung base consistent with atelectasis or pneumonitis.      2.  Blunting right costophrenic angle consistent with pleural effusion and/or pleural inflammation.      3.  Locking loop catheter overlying the right lung base with no evidence of pneumothorax.      IR-CHEST TUBE-EMPYEMA RIGHT   Final Result          1. Ultrasound and fluoroscopy guided  Right  12 Danish Pigtail Chest Tube Placement For Possible Empyema Versus Loculated Pleural Effusion.         CT-CTA CHEST PULMONARY ARTERY W/ RECONS   Final Result         1. No CT evidence of pulmonary embolism.      2. Moderate loculated right pleural effusion.      3. Patchy airspace opacity in the right middle lobe and right lower lobe, likely pneumonia.      DX-CHEST-PORTABLE (1 VIEW)   Final Result         Worsening airspace opacity in the right lower lung.   Loculated right pleural effusion, worse than prior.           Assessment/Plan  * Parapneumonic effusion- (present on admission)  Assessment & Plan  Found on CT scan  Patient does have a underlying history of cirrhosis  Culture so far no growth  Continue on Zosyn for now  Surgery consulted and chest tube drain placed.  Pulmonology following for lytic therapy    Candiduria  Assessment & Plan  Given dose of fluconazole    Pneumonia  Assessment & Plan  Failed outpatient pneumonia treatment  Continue on Zosyn  Follow pleural fluid culture, chest tube output      Cirrhosis (HCC)  Assessment & Plan  Seen on imaging  JENNY, anti-smooth muscle antibody are pending  negative for viral hepatitis  Will need to follow-up with gastroenterology      Acute respiratory failure with hypoxia (HCC)  Assessment & Plan  Pleural effusion and pneumonia  On 1-3 L O2  Continue chest tube to drainage    Tobacco abuse- (present on admission)  Assessment & Plan  Tobacco cessation education provided    Hyponatremia- (present on admission)  Assessment & Plan  Resolved    Type 2 diabetes mellitus with hyperglycemia, without long-term current use of insulin (HCC)- (present on admission)  Assessment & Plan  Takes metformin outpatient  A1c is 10.1%  Remains hyperglycemic, I will increase Lantus to 15 units twice daily  ISS  Needs diabetes education             VTE prophylaxis: SCDs/TEDs    I have performed a physical exam and reviewed and updated ROS  and Plan today (4/18/2023). In review of yesterday's note (4/17/2023), there are no changes except as documented above.

## 2023-04-18 NOTE — CARE PLAN
Problem: Hyperinflation  Goal: Prevent or improve atelectasis  Description: Target End Date:  3 to 4 days    1. Instruct incentive spirometry usage  2.  Perform hyperinflation therapy as indicated  Outcome: Not Met       QID PEP

## 2023-04-18 NOTE — PROCEDURES
Procedure Date: 4/18/2023    Procedure: Intrapleual Fibrinolytics (98143)    Physician: Jennifer Mendez MD RD    Anesthesia Type: N/A    Indication: complicated parapneumonic pleural effusion    Time Out: Patient was identified with two patient identifiers and site was confirmed.    Pre-Op Dx: complicated parapneumonic pleural effusion    Post-Op Dx: complicated parapneumonic pleural effusion    Medications:   Alteplase 5mg  Dornase Alpha 5mg     Description:    5mg of Alteplase and 5mg of Dornase were prepared by pharmacy in normal saline and maintained chilled until administration.  These solutions were mixed at bedside and the total fluid instillation was 50cc.  Patient was positioned in supine position.  Chest tube connections were prepped and cleaned. Alteplase/Dornase solution was drawn up in the appropriate syringe and medications were then injected into the chest tube, avoiding the introduction of air. Chest tube connection to pleurvac was then reconnected. Chest tube was left clamped/closed. Patient was instructed to lie in lateral position x 1 hours (60 minutes on the left).  Chest tube will be placed back on suction in 1 hour.    RN notified.     Complications: none    F/U: routine chest tube care    Jennifer Mendez MD RD  Pulmonary and Critical Care    [facility]

## 2023-04-18 NOTE — CARE PLAN
The patient is Stable - Low risk of patient condition declining or worsening    Shift Goals  Clinical Goals: Chest tube, rest, DM education  Patient Goals: DM education    Progress made toward(s) clinical / shift goals:  Pt resting. CT to -20 mmHg. Fibrolytics BID. Pt educated on diet and CHO diet.     Patient is not progressing towards the following goals:

## 2023-04-18 NOTE — CARE PLAN
Problem: Hyperinflation  Goal: Prevent or improve atelectasis  Description: Target End Date:  3 to 4 days    1. Instruct incentive spirometry usage  2.  Perform hyperinflation therapy as indicated  Outcome: Progressing  Hyperinflation Protocol Goals/Outcome: Stable Vital Capacity x24 hrs and Patient Understands / uses I.S.  Hyperinflation Protocol Indications: Pneumonia       Respiratory Update    Treatment modality: PEP  Frequency: QID    IS: 1500    Pt tolerating current treatments well with no adverse reactions.

## 2023-04-18 NOTE — PROGRESS NOTES
Received report from previous shift RN at 1900.  Assessment complete.  A&O x 4. Patient calls appropriately.  Patient ambulates independently.  Patient has 10/10 pain. Pain managed with prescribed medications per MAR, ice, and distraction.  Denies N&V. Tolerating diabetic diet.  R CT to -20mmHg, dressing in place, CDI.  + void, + flatus, + BM on 4/16.  Patient denies SOB on room air.  SCD's refused.    Patient pleasant and cooperative throughout assessment.  Reviewed plan of care with patient, pt verbalizes understanding. Call light and personal belongings with in reach. Hourly rounding in place. All needs met at this time.

## 2023-04-18 NOTE — CARE PLAN
The patient is Stable - Low risk of patient condition declining or worsening    Shift Goals  Clinical Goals: Chest Tube Monitoring; Pain Control; Pulmonary Hygiene  Patient Goals: Pain Control; Comfort    Progress made toward(s) clinical / shift goals:  Patient medicated per MAR. Non-pharmacologic comfort measures implemented. Safety discussed. Education provided. Ambulation and repositioning encouraged.     Problem: Knowledge Deficit - Standard  Goal: Patient and family/care givers will demonstrate understanding of plan of care, disease process/condition, diagnostic tests and medications  Outcome: Progressing     Problem: Respiratory  Goal: Patient will achieve/maintain optimum respiratory ventilation and gas exchange  Outcome: Progressing     Problem: Pain - Standard  Goal: Alleviation of pain or a reduction in pain to the patient’s comfort goal  Outcome: Progressing

## 2023-04-18 NOTE — DOCUMENTATION QUERY
[facility]                                                                       Query Response Note      PATIENT:               CARRIE WEBSTER  ACCT #:                  [id number]  MRN:                     [id number]  :                      1980  ADMIT DATE:       4/15/2023 7:50 PM  DISCH DATE:          RESPONDING  PROVIDER #:        [id number]           QUERY TEXT:    Sepsis is documented in the ED Provider Note and is not mentioned in the H&P.    After further study, can the diagnosis of sepsis be clarified?      The patient's clinical indicators include:  ED Provider Note: Sepsis   4/15 Pulse range: 109-121; 4/15 RR Range: 12-31  4/15 WBC 11.2,  WBC 10.7  4/15 Procal 0.11, lactic acid 1.7  Risk Factors: parapneumonic effusion/ empyema, pneumonia, candiduria, DM  Treatment: Zosyn, Unasyn, Zithromax, Vanco, Diflucan, IVF    Thank you,  Phong Loco RN, BSN  Clinical   Connect via reQwip  Options provided:   -- Sepsis with associated acute respiratory failure (severe sepsis) ruled in   -- Sepsis is ruled out   -- Sepsis without associated organ dysfunction ruled in   -- Other explanation, please specify   -- Unable to determine      Query created by: Phong Loco on 2023 9:44 AM    RESPONSE TEXT:    Sepsis with associated acute respiratory failure (severe sepsis) ruled in          Electronically signed by:  ORTEGA KAISER MD 2023 10:53 PM

## 2023-04-19 ENCOUNTER — APPOINTMENT (OUTPATIENT)
Dept: RADIOLOGY | Facility: MEDICAL CENTER | Age: 43
DRG: 871 | End: 2023-04-19
Attending: SURGERY
Payer: MEDICAID

## 2023-04-19 ENCOUNTER — APPOINTMENT (OUTPATIENT)
Dept: RADIOLOGY | Facility: MEDICAL CENTER | Age: 43
DRG: 871 | End: 2023-04-19
Attending: INTERNAL MEDICINE
Payer: MEDICAID

## 2023-04-19 LAB
ALBUMIN SERPL BCP-MCNC: 2.4 G/DL (ref 3.2–4.9)
ALBUMIN/GLOB SERPL: 0.7 G/DL
ALP SERPL-CCNC: 90 U/L (ref 30–99)
ALT SERPL-CCNC: 7 U/L (ref 2–50)
ANION GAP SERPL CALC-SCNC: 6 MMOL/L (ref 7–16)
AST SERPL-CCNC: 7 U/L (ref 12–45)
BASOPHILS # BLD AUTO: 0.6 % (ref 0–1.8)
BASOPHILS # BLD: 0.04 K/UL (ref 0–0.12)
BILIRUB SERPL-MCNC: <0.2 MG/DL (ref 0.1–1.5)
BUN SERPL-MCNC: 8 MG/DL (ref 8–22)
CALCIUM ALBUM COR SERPL-MCNC: 10 MG/DL (ref 8.5–10.5)
CALCIUM SERPL-MCNC: 8.7 MG/DL (ref 8.5–10.5)
CHLORIDE SERPL-SCNC: 102 MMOL/L (ref 96–112)
CO2 SERPL-SCNC: 25 MMOL/L (ref 20–33)
CREAT SERPL-MCNC: 0.59 MG/DL (ref 0.5–1.4)
EOSINOPHIL # BLD AUTO: 0.3 K/UL (ref 0–0.51)
EOSINOPHIL NFR BLD: 4.2 % (ref 0–6.9)
ERYTHROCYTE [DISTWIDTH] IN BLOOD BY AUTOMATED COUNT: 43.3 FL (ref 35.9–50)
GFR SERPLBLD CREATININE-BSD FMLA CKD-EPI: 115 ML/MIN/1.73 M 2
GLOBULIN SER CALC-MCNC: 3.5 G/DL (ref 1.9–3.5)
GLUCOSE BLD STRIP.AUTO-MCNC: 174 MG/DL (ref 65–99)
GLUCOSE BLD STRIP.AUTO-MCNC: 199 MG/DL (ref 65–99)
GLUCOSE BLD STRIP.AUTO-MCNC: 226 MG/DL (ref 65–99)
GLUCOSE BLD STRIP.AUTO-MCNC: 240 MG/DL (ref 65–99)
GLUCOSE SERPL-MCNC: 246 MG/DL (ref 65–99)
HCT VFR BLD AUTO: 30.6 % (ref 37–47)
HGB BLD-MCNC: 9.6 G/DL (ref 12–16)
IMM GRANULOCYTES # BLD AUTO: 0.07 K/UL (ref 0–0.11)
IMM GRANULOCYTES NFR BLD AUTO: 1 % (ref 0–0.9)
LYMPHOCYTES # BLD AUTO: 1.08 K/UL (ref 1–4.8)
LYMPHOCYTES NFR BLD: 15.2 % (ref 22–41)
MCH RBC QN AUTO: 25.7 PG (ref 27–33)
MCHC RBC AUTO-ENTMCNC: 31.4 G/DL (ref 33.6–35)
MCV RBC AUTO: 81.8 FL (ref 81.4–97.8)
MONOCYTES # BLD AUTO: 0.49 K/UL (ref 0–0.85)
MONOCYTES NFR BLD AUTO: 6.9 % (ref 0–13.4)
NEUTROPHILS # BLD AUTO: 5.14 K/UL (ref 2–7.15)
NEUTROPHILS NFR BLD: 72.1 % (ref 44–72)
NRBC # BLD AUTO: 0 K/UL
NRBC BLD-RTO: 0 /100 WBC
PLATELET # BLD AUTO: 314 K/UL (ref 164–446)
PMV BLD AUTO: 9.8 FL (ref 9–12.9)
POTASSIUM SERPL-SCNC: 4.5 MMOL/L (ref 3.6–5.5)
PROT SERPL-MCNC: 5.9 G/DL (ref 6–8.2)
RBC # BLD AUTO: 3.74 M/UL (ref 4.2–5.4)
SODIUM SERPL-SCNC: 133 MMOL/L (ref 135–145)
WBC # BLD AUTO: 7.1 K/UL (ref 4.8–10.8)

## 2023-04-19 PROCEDURE — 770001 HCHG ROOM/CARE - MED/SURG/GYN PRIV*

## 2023-04-19 PROCEDURE — 85025 COMPLETE CBC W/AUTO DIFF WBC: CPT

## 2023-04-19 PROCEDURE — 80053 COMPREHEN METABOLIC PANEL: CPT

## 2023-04-19 PROCEDURE — 700111 HCHG RX REV CODE 636 W/ 250 OVERRIDE (IP): Performed by: HOSPITALIST

## 2023-04-19 PROCEDURE — 32562 LYSE CHEST FIBRIN SUBQ DAY: CPT | Performed by: INTERNAL MEDICINE

## 2023-04-19 PROCEDURE — 3E0L3GC INTRODUCTION OF OTHER THERAPEUTIC SUBSTANCE INTO PLEURAL CAVITY, PERCUTANEOUS APPROACH: ICD-10-PCS | Performed by: INTERNAL MEDICINE

## 2023-04-19 PROCEDURE — 94669 MECHANICAL CHEST WALL OSCILL: CPT

## 2023-04-19 PROCEDURE — C1729 CATH, DRAINAGE: HCPCS | Performed by: NURSE PRACTITIONER

## 2023-04-19 PROCEDURE — 700105 HCHG RX REV CODE 258: Performed by: HOSPITALIST

## 2023-04-19 PROCEDURE — A9270 NON-COVERED ITEM OR SERVICE: HCPCS | Performed by: INTERNAL MEDICINE

## 2023-04-19 PROCEDURE — 71045 X-RAY EXAM CHEST 1 VIEW: CPT

## 2023-04-19 PROCEDURE — 700101 HCHG RX REV CODE 250: Performed by: INTERNAL MEDICINE

## 2023-04-19 PROCEDURE — 700102 HCHG RX REV CODE 250 W/ 637 OVERRIDE(OP): Performed by: INTERNAL MEDICINE

## 2023-04-19 PROCEDURE — 700102 HCHG RX REV CODE 250 W/ 637 OVERRIDE(OP): Performed by: HOSPITALIST

## 2023-04-19 PROCEDURE — 99233 SBSQ HOSP IP/OBS HIGH 50: CPT | Performed by: NURSE PRACTITIONER

## 2023-04-19 PROCEDURE — 36415 COLL VENOUS BLD VENIPUNCTURE: CPT

## 2023-04-19 PROCEDURE — 94760 N-INVAS EAR/PLS OXIMETRY 1: CPT

## 2023-04-19 PROCEDURE — 82962 GLUCOSE BLOOD TEST: CPT | Mod: 91

## 2023-04-19 PROCEDURE — A9270 NON-COVERED ITEM OR SERVICE: HCPCS | Performed by: HOSPITALIST

## 2023-04-19 PROCEDURE — 99232 SBSQ HOSP IP/OBS MODERATE 35: CPT | Performed by: INTERNAL MEDICINE

## 2023-04-19 PROCEDURE — 99232 SBSQ HOSP IP/OBS MODERATE 35: CPT | Mod: 25 | Performed by: INTERNAL MEDICINE

## 2023-04-19 PROCEDURE — 700111 HCHG RX REV CODE 636 W/ 250 OVERRIDE (IP): Performed by: INTERNAL MEDICINE

## 2023-04-19 PROCEDURE — 700105 HCHG RX REV CODE 258: Performed by: INTERNAL MEDICINE

## 2023-04-19 RX ORDER — OXYCODONE HYDROCHLORIDE 5 MG/1
5 TABLET ORAL ONCE
Status: COMPLETED | OUTPATIENT
Start: 2023-04-19 | End: 2023-04-19

## 2023-04-19 RX ADMIN — OXYCODONE 5 MG: 5 TABLET ORAL at 06:19

## 2023-04-19 RX ADMIN — TRAZODONE HYDROCHLORIDE 50 MG: 50 TABLET ORAL at 21:35

## 2023-04-19 RX ADMIN — DORNASE ALFA 5 MG: 1 SOLUTION RESPIRATORY (INHALATION) at 10:30

## 2023-04-19 RX ADMIN — OXYCODONE 5 MG: 5 TABLET ORAL at 16:28

## 2023-04-19 RX ADMIN — INSULIN HUMAN 3 UNITS: 100 INJECTION, SOLUTION PARENTERAL at 21:45

## 2023-04-19 RX ADMIN — ALTEPLASE 5 MG: KIT at 17:23

## 2023-04-19 RX ADMIN — KETOROLAC TROMETHAMINE 30 MG: 30 INJECTION, SOLUTION INTRAMUSCULAR; INTRAVENOUS at 08:57

## 2023-04-19 RX ADMIN — GUAIFENESIN 600 MG: 600 TABLET, EXTENDED RELEASE ORAL at 06:19

## 2023-04-19 RX ADMIN — PIPERACILLIN AND TAZOBACTAM 4.5 G: 4; .5 INJECTION, POWDER, LYOPHILIZED, FOR SOLUTION INTRAVENOUS; PARENTERAL at 14:05

## 2023-04-19 RX ADMIN — KETOROLAC TROMETHAMINE 30 MG: 30 INJECTION, SOLUTION INTRAMUSCULAR; INTRAVENOUS at 16:39

## 2023-04-19 RX ADMIN — DORNASE ALFA 5 MG: 1 SOLUTION RESPIRATORY (INHALATION) at 17:23

## 2023-04-19 RX ADMIN — PIPERACILLIN AND TAZOBACTAM 4.5 G: 4; .5 INJECTION, POWDER, LYOPHILIZED, FOR SOLUTION INTRAVENOUS; PARENTERAL at 06:18

## 2023-04-19 RX ADMIN — ALTEPLASE 5 MG: KIT at 10:30

## 2023-04-19 RX ADMIN — KETOROLAC TROMETHAMINE 30 MG: 30 INJECTION, SOLUTION INTRAMUSCULAR; INTRAVENOUS at 23:16

## 2023-04-19 RX ADMIN — INSULIN HUMAN 2 UNITS: 100 INJECTION, SOLUTION PARENTERAL at 08:55

## 2023-04-19 RX ADMIN — INSULIN HUMAN 3 UNITS: 100 INJECTION, SOLUTION PARENTERAL at 12:53

## 2023-04-19 RX ADMIN — GUAIFENESIN 600 MG: 600 TABLET, EXTENDED RELEASE ORAL at 18:14

## 2023-04-19 RX ADMIN — DOCUSATE SODIUM 50 MG AND SENNOSIDES 8.6 MG 2 TABLET: 8.6; 5 TABLET, FILM COATED ORAL at 06:19

## 2023-04-19 RX ADMIN — PIPERACILLIN AND TAZOBACTAM 4.5 G: 4; .5 INJECTION, POWDER, LYOPHILIZED, FOR SOLUTION INTRAVENOUS; PARENTERAL at 21:39

## 2023-04-19 RX ADMIN — KETOROLAC TROMETHAMINE 30 MG: 30 INJECTION, SOLUTION INTRAMUSCULAR; INTRAVENOUS at 01:02

## 2023-04-19 RX ADMIN — INSULIN HUMAN 2 UNITS: 100 INJECTION, SOLUTION PARENTERAL at 18:15

## 2023-04-19 RX ADMIN — DOCUSATE SODIUM 50 MG AND SENNOSIDES 8.6 MG 2 TABLET: 8.6; 5 TABLET, FILM COATED ORAL at 18:14

## 2023-04-19 RX ADMIN — Medication 5 MG: at 21:35

## 2023-04-19 ASSESSMENT — PAIN DESCRIPTION - PAIN TYPE
TYPE: ACUTE PAIN

## 2023-04-19 ASSESSMENT — ENCOUNTER SYMPTOMS
HEARTBURN: 0
SHORTNESS OF BREATH: 0
MYALGIAS: 1
HEADACHES: 0
WEAKNESS: 0
ABDOMINAL PAIN: 0
COUGH: 0
NAUSEA: 0
FEVER: 0
DIZZINESS: 0

## 2023-04-19 NOTE — CARE PLAN
The patient is Stable - Low risk of patient condition declining or worsening    Shift Goals  Clinical Goals: Monitor Chest Tube; Pulmonary Hygiene; Pain Control  Patient Goals: Pain Control; Comfort    Progress made toward(s) clinical / shift goals:  Patient medicated per MAR. Non-pharmacologic comfort measures implemented. Safety discussed. Education provided. Ambulation and repositioning encouraged.     Problem: Knowledge Deficit - Standard  Goal: Patient and family/care givers will demonstrate understanding of plan of care, disease process/condition, diagnostic tests and medications  Outcome: Progressing     Problem: Respiratory  Goal: Patient will achieve/maintain optimum respiratory ventilation and gas exchange  Outcome: Progressing     Problem: Pain - Standard  Goal: Alleviation of pain or a reduction in pain to the patient’s comfort goal  Outcome: Progressing

## 2023-04-19 NOTE — PROCEDURES
Procedure Date: 4/18/2023    Procedure: Intrapleual Fibrinolytics (12252)    Physician: Jennifer Mendez MD RD    Anesthesia Type: N/A    Indication: complicated parapneumonic pleural effusion    Time Out: Patient was identified with two patient identifiers and site was confirmed.    Pre-Op Dx: complicated parapneumonic pleural effusion    Post-Op Dx: complicated parapneumonic pleural effusion    Medications:   Alteplase 5mg  Dornase Alpha 5mg     Description:    5mg of Alteplase and 5mg of Dornase were prepared by pharmacy in normal saline and maintained chilled until administration.  These solutions were mixed at bedside and the total fluid instillation was 50cc.  Patient was positioned in left lateral decubitus position.  Chest tube connections were prepped and cleaned. Alteplase/Dornase solution was drawn up in the appropriate syringe and medications were then injected into the chest tube, avoiding the introduction of air. Chest tube connection to pleurvac was then reconnected. Chest tube was left clamped/closed. Patient was instructed to lie in right lateral position x 1 hours (60 minutes on the right lateral decubitus position).  Chest tube will be placed back on suction in 1 hour.    RN notified.     Complications: none    F/U: routine chest tube care    Jennifer Mendez MD RD  Pulmonary and Critical Care    Available on Voalte

## 2023-04-19 NOTE — PROGRESS NOTES
Pulmonary Progress Note    Date of admission  4/15/2023    Chief Complaint  42 y.o. female admitted 4/15/2023 with parapneumonic effusion    Hospital Course  42 y.o. female who presented 4/15/2023 with a history of substance abuse (meth and tobacco) with productive cough, pleuritic chest pain and fevers for a week.  Surgery was consulted on admission and recommended an IR chest tube to drain lytics. Initially pleural fluid was not sent for studies but the surgery team was able to get the fluid sent after and it is consistent with a parapneumonic effusion.  Cultures negative.      Interval Problem Update  Reviewed last 24 hour events:  4/17/23: Lytic dose 1, minimal output  4/18/23: Lytic doses 2 and 3, 1L output  4/19/23: Lytic doses 4 and 5 today    Review of Systems  Review of Systems   Constitutional:  Positive for malaise/fatigue. Negative for fever.   HENT:  Negative for congestion.    Respiratory:  Negative for cough and shortness of breath.    Cardiovascular:  Positive for chest pain. Negative for leg swelling.   Gastrointestinal:  Negative for heartburn.   Neurological:  Negative for dizziness and headaches.      Vital Signs for last 24 hours   Temp:  [36.5 °C (97.7 °F)-37 °C (98.6 °F)] 36.8 °C (98.2 °F)  Pulse:  [] 85  Resp:  [16-18] 16  BP: (104-163)/(67-89) 162/89  SpO2:  [90 %-98 %] 93 %    Hemodynamic parameters for last 24 hours       Respiratory Information for the last 24 hours       Physical Exam   Physical Exam  Constitutional:       Appearance: Normal appearance.   HENT:      Head: Atraumatic.   Cardiovascular:      Rate and Rhythm: Normal rate and regular rhythm.      Heart sounds: Normal heart sounds.   Pulmonary:      Effort: Pulmonary effort is normal. No respiratory distress.      Breath sounds: Normal breath sounds. No wheezing or rales.      Comments: Right sided chest tube in place  Abdominal:      Palpations: Abdomen is soft.   Musculoskeletal:         General: No swelling,  tenderness or deformity.   Skin:     General: Skin is warm and dry.   Neurological:      General: No focal deficit present.      Mental Status: She is alert and oriented to person, place, and time.       Medications  Current Facility-Administered Medications   Medication Dose Route Frequency Provider Last Rate Last Admin    alteplase (Activase) 5 mg in NS 30 mL INTRAPLEURAL syringe  5 mg Intrapleural TWICE DAILY Jennifer Mendez M.D.   5 mg at 04/19/23 1030    And    dornase alpha (PULMOZYME) 5 mg in NS 30 mL INTRAPLEURAL syringe  5 mg Intrapleural TWICE DAILY Jennifer Mendez M.D.   5 mg at 04/19/23 1030    melatonin tablet 5 mg  5 mg Oral Nightly Syd Miller M.D.   5 mg at 04/18/23 2153    traZODone (DESYREL) tablet 50 mg  50 mg Oral QHS Syd Miller M.D.   50 mg at 04/18/23 2152    insulin GLARGINE (Lantus,Semglee) injection  15 Units Subcutaneous BID Syd Miller M.D.   15 Units at 04/19/23 0853    ketorolac (TORADOL) injection 30 mg  30 mg Intravenous Q6HRS PRN Mode Garcia M.D.   30 mg at 04/19/23 0857    oxyCODONE immediate-release (ROXICODONE) tablet 5 mg  5 mg Oral Q4HRS PRN Mode Garcia M.D.   5 mg at 04/19/23 0619    piperacillin-tazobactam (Zosyn) 4.5 g in  mL IVPB  4.5 g Intravenous Q8HRS Mode Garcia M.D. 25 mL/hr at 04/19/23 0618 4.5 g at 04/19/23 0618    insulin regular (HumuLIN R,NovoLIN R) injection  2-9 Units Subcutaneous 4X/DAY ACHS Mode Garcia M.D.   2 Units at 04/19/23 0855    And    dextrose 10 % BOLUS 25 g  25 g Intravenous Q15 MIN PRN Mode Garcia M.D.        guaiFENesin ER (MUCINEX) tablet 600 mg  600 mg Oral Q12HRS Mode Garcia M.D.   600 mg at 04/19/23 0619    senna-docusate (PERICOLACE or SENOKOT S) 8.6-50 MG per tablet 2 Tablet  2 Tablet Oral BID Mode Garcia M.D.   2 Tablet at 04/19/23 0619    And    polyethylene glycol/lytes (MIRALAX) PACKET 1 Packet  1 Packet Oral QDAY PRN Mode Garcia M.D.        And    magnesium hydroxide (MILK OF MAGNESIA) suspension 30 mL  30 mL  Oral QDAY PRN Mode Garcia M.D.        And    bisacodyl (DULCOLAX) suppository 10 mg  10 mg Rectal QDAY PRN Mode Garcia M.D.        Respiratory Therapy Consult   Nebulization Continuous RT Mode Garcia M.D.        acetaminophen (Tylenol) tablet 650 mg  650 mg Oral Q6HRS PRN Mode Garcia M.D.   650 mg at 04/18/23 0452    labetalol (NORMODYNE/TRANDATE) injection 10 mg  10 mg Intravenous Q4HRS PRN Mode Garcia M.D.        ondansetron (ZOFRAN) syringe/vial injection 4 mg  4 mg Intravenous Q4HRS PRN Mode Garcia M.D.        ondansetron (ZOFRAN ODT) dispertab 4 mg  4 mg Oral Q4HRS PRTODD Garcia M.D.        promethazine (PHENERGAN) tablet 12.5-25 mg  12.5-25 mg Oral Q4HRS PRTODD Garcia M.D.        promethazine (PHENERGAN) suppository 12.5-25 mg  12.5-25 mg Rectal Q4HRS PRTODD Garcia M.D.        prochlorperazine (COMPAZINE) injection 5-10 mg  5-10 mg Intravenous Q4HRS PRTODD Garcia M.D.        guaiFENesin dextromethorphan (ROBITUSSIN DM) 100-10 MG/5ML syrup 10 mL  10 mL Oral Q6HRS PRTODD Garcia M.D.           Fluids    Intake/Output Summary (Last 24 hours) at 4/19/2023 1033  Last data filed at 4/19/2023 0451  Gross per 24 hour   Intake 600 ml   Output 1080 ml   Net -480 ml       Laboratory          Recent Labs     04/17/23  0353 04/18/23  0438   SODIUM 133* 135   POTASSIUM 4.3 4.4   CHLORIDE 99 100   CO2 22 27   BUN 4* 8   CREATININE 0.62 0.53   CALCIUM 9.1 8.7     Recent Labs     04/17/23  0353 04/18/23  0438   ALTSGPT 7 11   ASTSGOT 9* 9*   ALKPHOSPHAT 106* 99   TBILIRUBIN 0.3 0.2   GLUCOSE 221* 212*     Recent Labs     04/17/23  0353 04/18/23  0438   WBC 8.2 8.0   ASTSGOT 9* 9*   ALTSGPT 7 11   ALKPHOSPHAT 106* 99   TBILIRUBIN 0.3 0.2     Recent Labs     04/17/23  0353 04/18/23  0438   RBC 4.05* 3.57*   HEMOGLOBIN 10.2* 9.1*   HEMATOCRIT 33.9* 29.1*   PLATELETCT 282 267       Imaging  X-Ray:  I have personally reviewed the images and compared with prior images.  CT:     Reviewed    Assessment/Plan  #Parapneumonic effusion  Plan:  - Pleural cultures negative  - Lytics started 4/17, Today will be doses 4 and 5  - Will premedicate this afternoon due to increased pain with administration this morning  - Plan for 6 total doses of lytic therapy   - Monitor I:O  - Daily CXR - improved this am  - Pulmonary following for lytics, surgery to manage chest tube.      Jennifer Mendez MD RD  Pulmonary and Critical Care     Available on Voalte

## 2023-04-19 NOTE — PROGRESS NOTES
Assumed care of patient at 0700. Patient is alert and oriented, respirations are unlabored and regular on room air. Lung sounds clear throughout, diminished in bases, more diminished on the right lower lobe. Right chest tube in place, to suction, physician at bedside administering meds. Chest tube clamped until 1130. Abdomen soft, non tender, +bowel sounds, +flatus, BM 4/17. Voiding without difficulty. Bed in lowest position, call light within reach, patient states no needs at this time.

## 2023-04-19 NOTE — PROGRESS NOTES
"  DATE: 4/19/2023 4/16 surgical consultation for evaluation of parapneumonic effusion.  4/17 IR right chest tube placement.    INTERVAL EVENTS:  Chest tube to suction with no airleak.  Intrapleural fibrinolytic therapy to complete today.    -CT chest tomorrow for follow-up of parapneumonic effusion status post drain placement and fibrinolytic therapy.    REVIEW OF SYSTEMS:  Comprehensive review of systems is negative with the exception of the aforementioned HPI, PMH, and PSH bullets in accordance with CMS guidelines.    PHYSICAL EXAMINATION:    Vital Signs: BP (!) 162/89   Pulse 85   Temp 36.8 °C (98.2 °F) (Temporal)   Resp 16   Ht 1.727 m (5' 8\")   Wt (!) 128 kg (281 lb 12 oz)   SpO2 93%   Physical Exam  Vitals and nursing note reviewed.   Constitutional:       General: She is not in acute distress.     Appearance: She is not ill-appearing.   HENT:      Mouth: Mucous membranes are moist.   Eyes:      General:         Right eye: No discharge.         Left eye: No discharge.      Extraocular Movements: Extraocular movements intact.   Pulmonary:      Effort: Pulmonary effort is normal. No respiratory distress.      Right IR chest tube to suction with no air leak.  Abdominal:      General: There is no distension.      Palpations: Abdomen is soft.      Tenderness: There is no abdominal tenderness.   Skin:     General: Skin is warm and dry.      Capillary Refill: Capillary refill takes less than 2 seconds.   Neurological:      Mental Status: She is alert and oriented to person, place, and time.   Psychiatric:         Behavior: Behavior normal.         Thought Content: Thought content normal.    LABORATORY VALUES:   Recent Labs     04/17/23  0353 04/18/23 0438   WBC 8.2 8.0   RBC 4.05* 3.57*   HEMOGLOBIN 10.2* 9.1*   HEMATOCRIT 33.9* 29.1*   MCV 83.7 81.5   MCH 25.2* 25.5*   MCHC 30.1* 31.3*   RDW 44.5 43.2   PLATELETCT 282 267   MPV 10.4 10.4     Recent Labs     04/17/23  0353 04/18/23  0438   SODIUM 133* 135 "   POTASSIUM 4.3 4.4   CHLORIDE 99 100   CO2 22 27   GLUCOSE 221* 212*   BUN 4* 8   CREATININE 0.62 0.53   CALCIUM 9.1 8.7     Recent Labs     04/17/23  0353 04/18/23  0438   ASTSGOT 9* 9*   ALTSGPT 7 11   TBILIRUBIN 0.3 0.2   ALKPHOSPHAT 106* 99   GLOBULIN 4.3* 3.6*            IMAGING:   DX-CHEST-PORTABLE (1 VIEW)   Final Result         1.  Bilateral lower lobe infiltrates, greater on the right, stable since prior study.   2.  Trace bilateral pleural effusions      DX-CHEST-LIMITED (1 VIEW)   Final Result      1.  Wedge-shaped opacity in right lung base consistent with atelectasis or pneumonitis.      2.  Blunting right costophrenic angle consistent with pleural effusion and/or pleural inflammation.      3.  Locking loop catheter overlying the right lung base with no evidence of pneumothorax.      IR-CHEST TUBE-EMPYEMA RIGHT   Final Result         1. Ultrasound and fluoroscopy guided  Right  12 [place]ese Pigtail Chest Tube Placement For Possible Empyema Versus Loculated Pleural Effusion.         CT-CTA CHEST PULMONARY ARTERY W/ RECONS   Final Result         1. No CT evidence of pulmonary embolism.      2. Moderate loculated right pleural effusion.      3. Patchy airspace opacity in the right middle lobe and right lower lobe, likely pneumonia.      DX-CHEST-PORTABLE (1 VIEW)   Final Result         Worsening airspace opacity in the right lower lung.   Loculated right pleural effusion, worse than prior.          ASSESSMENT AND PLAN:     * Parapneumonic effusion- (present on admission)  Assessment & Plan  4/17 Right chest tube placement for loculated effusion/empyema.  4/17 through 4/19 Intrapleual fibrinolytics with pulmonology.   4/20 CT chest.   ABX per medicine.   ACS gray.          ____________________________________     REBECCA Marmolejo.    DD: 4/19/2023  9:38 AM

## 2023-04-19 NOTE — PROGRESS NOTES
Hospital Medicine Daily Progress Note    Date of Service  4/19/2023    Chief Complaint  Jc Ramos is a 42 y.o. female admitted 4/15/2023 with SOB    Hospital Course  43 yo woman with meth use, tobacco use who presented with SOB and cough despite recent oral treatment for pneumonia.  CT showed loculated right pleural effusion with pneumonia.  Surgery was consulted and had IR placed right chest tube 4/17.  Pulmonology was consulted.    Interval Problem Update  On zosyn.  Pleural fluid culture continues to have no growth  Weaned to 1L O2. Chest tube with 1080cc output  Lytic therapy with pulmonology today  Denies shortness of breath, cough, pain.    I have discussed this patient's plan of care and discharge plan at IDT rounds today with Case Management, Nursing, Nursing leadership, and other members of the IDT team.    Consultants/Specialty  general surgery and pulmonary    Code Status  Full Code    Disposition  Patient is not medically cleared for discharge.   Anticipate discharge to to home with close outpatient follow-up.  I have placed the appropriate orders for post-discharge needs.    Review of Systems  Review of Systems   Constitutional:  Positive for malaise/fatigue.   Respiratory:  Negative for cough and shortness of breath.    Cardiovascular:  Negative for chest pain.   Gastrointestinal:  Negative for abdominal pain and nausea.   Musculoskeletal:  Positive for myalgias.   Neurological:  Negative for dizziness and weakness.      Physical Exam  Temp:  [36.8 °C (98.2 °F)-36.9 °C (98.4 °F)] 36.8 °C (98.2 °F)  Pulse:  [] 93  Resp:  [16-18] 16  BP: (147-163)/(76-89) 153/85  SpO2:  [90 %-96 %] 90 %    Physical Exam  Vitals and nursing note reviewed.   Constitutional:       General: She is not in acute distress.     Appearance: She is not toxic-appearing.   HENT:      Head: Normocephalic.      Mouth/Throat:      Mouth: Mucous membranes are moist.   Eyes:      General:         Right eye: No discharge.          Left eye: No discharge.   Cardiovascular:      Rate and Rhythm: Normal rate and regular rhythm.   Pulmonary:      Effort: No respiratory distress.      Breath sounds: Rales present. No wheezing.      Comments: Right-sided chest tube with poor air movement  Abdominal:      Palpations: Abdomen is soft.      Tenderness: There is no abdominal tenderness.   Musculoskeletal:         General: No swelling.      Cervical back: Neck supple.   Skin:     General: Skin is warm and dry.   Neurological:      Mental Status: She is alert and oriented to person, place, and time.       Fluids    Intake/Output Summary (Last 24 hours) at 4/19/2023 1601  Last data filed at 4/19/2023 0451  Gross per 24 hour   Intake 600 ml   Output 930 ml   Net -330 ml       Laboratory  Recent Labs     04/17/23  0353 04/18/23  0438 04/19/23  1144   WBC 8.2 8.0 7.1   RBC 4.05* 3.57* 3.74*   HEMOGLOBIN 10.2* 9.1* 9.6*   HEMATOCRIT 33.9* 29.1* 30.6*   MCV 83.7 81.5 81.8   MCH 25.2* 25.5* 25.7*   MCHC 30.1* 31.3* 31.4*   RDW 44.5 43.2 43.3   PLATELETCT 282 267 314   MPV 10.4 10.4 9.8     Recent Labs     04/17/23  0353 04/18/23  0438 04/19/23  1144   SODIUM 133* 135 133*   POTASSIUM 4.3 4.4 4.5   CHLORIDE 99 100 102   CO2 22 27 25   GLUCOSE 221* 212* 246*   BUN 4* 8 8   CREATININE 0.62 0.53 0.59   CALCIUM 9.1 8.7 8.7                     Imaging  DX-CHEST-PORTABLE (1 VIEW)   Final Result         1.  Bilateral lower lobe infiltrates, greater on the right, stable since prior study.   2.  Trace bilateral pleural effusions      DX-CHEST-LIMITED (1 VIEW)   Final Result      1.  Wedge-shaped opacity in right lung base consistent with atelectasis or pneumonitis.      2.  Blunting right costophrenic angle consistent with pleural effusion and/or pleural inflammation.      3.  Locking loop catheter overlying the right lung base with no evidence of pneumothorax.      IR-CHEST TUBE-EMPYEMA RIGHT   Final Result         1. Ultrasound and fluoroscopy guided  Right  12  [place]ese Pigtail Chest Tube Placement For Possible Empyema Versus Loculated Pleural Effusion.         CT-CTA CHEST PULMONARY ARTERY W/ RECONS   Final Result         1. No CT evidence of pulmonary embolism.      2. Moderate loculated right pleural effusion.      3. Patchy airspace opacity in the right middle lobe and right lower lobe, likely pneumonia.      DX-CHEST-PORTABLE (1 VIEW)   Final Result         Worsening airspace opacity in the right lower lung.   Loculated right pleural effusion, worse than prior.           Assessment/Plan  * Parapneumonic effusion- (present on admission)  Assessment & Plan  Found on CT scan  Patient does have a underlying history of cirrhosis  Pleural fluid culture has no growth  Continue on Zosyn for now  Surgery consulted and chest tube drain placed.  Pulmonology following for lytic therapy  Plan for CT chest tomorrow    Candiduria  Assessment & Plan  Given dose of fluconazole    Pneumonia  Assessment & Plan  Failed outpatient pneumonia treatment  Continue on Zosyn  Follow pleural fluid culture, chest tube output      Cirrhosis (HCC)  Assessment & Plan  Seen on imaging  JENNY, anti-smooth muscle antibody were normal  negative for viral hepatitis  Will need to follow-up with gastroenterology      Acute respiratory failure with hypoxia (HCC)  Assessment & Plan  Pleural effusion and pneumonia  On 1-3 L O2  Continue chest tube to drainage    Tobacco abuse- (present on admission)  Assessment & Plan  Tobacco cessation education provided    Hyponatremia- (present on admission)  Assessment & Plan  Resolved    Type 2 diabetes mellitus with hyperglycemia, without long-term current use of insulin (HCC)- (present on admission)  Assessment & Plan  Takes metformin outpatient  A1c is 10.1%  Glucose is better controlled on Lantus 15 units twice daily  ISS  Needs diabetes education             VTE prophylaxis: SCDs/TEDs    I have performed a physical exam and reviewed and updated ROS and Plan today  (4/19/2023). In review of yesterday's note (4/18/2023), there are no changes except as documented above.

## 2023-04-19 NOTE — PROGRESS NOTES
Received report from previous shift RN at 1900.  Assessment complete.  A&O x 4. Patient calls appropriately.  Patient ambulates independently.  Patient has 7/10 pain. Pain managed with prescribed medications per MAR, distraction, and rest.  Denies N&V. Tolerating diabetic diet.  R CT to -20cm H2O, dressing in place, CDI.  + void, + flatus, - BM.  Patient denies SOB on 1L O2 via NC.  SCD's refused.    Patient pleasant and cooperative throughout assessment.  Reviewed plan of care with patient, pt verbalizes understanding. Call light and personal belongings with in reach. Hourly rounding in place. All needs met at this time.

## 2023-04-19 NOTE — CARE PLAN
The patient is Stable - Low risk of patient condition declining or worsening    Shift Goals  Clinical Goals: ambulate in hallway 250 ft x2 today, up to chair for meals, educate diet  Patient Goals: pain control less than 4    Progress made toward(s) clinical / shift goals:  ambulated 300ft once today, agreed to one more ambulation before shift change, edge of bed for meals, educated patient about diabetic diet    Patient is not progressing towards the following goals:      Problem: Knowledge Deficit - Standard  Goal: Patient and family/care givers will demonstrate understanding of plan of care, disease process/condition, diagnostic tests and medications  Outcome: Progressing     Problem: Respiratory  Goal: Patient will achieve/maintain optimum respiratory ventilation and gas exchange  Outcome: Progressing     Problem: Pain - Standard  Goal: Alleviation of pain or a reduction in pain to the patient’s comfort goal  Outcome: Progressing

## 2023-04-19 NOTE — PROCEDURES
Procedure Date: 4/19/2023    Procedure: Intrapleual Fibrinolytics (56000)    Physician: Jennifer Mendez MD RD    Anesthesia Type: N/A    Indication: complicated parapneumonic pleural effusion    Time Out: Patient was identified with two patient identifiers and site was confirmed.    Pre-Op Dx: complicated parapneumonic pleural effusion    Post-Op Dx: complicated parapneumonic pleural effusion    Medications:   Alteplase 5mg  Dornase Alpha 5mg     Description:    5mg of Alteplase and 5mg of Dornase were prepared by pharmacy in normal saline and maintained chilled until administration.  These solutions were mixed at bedside and the total fluid instillation was 50cc.  Patient was positioned in left lateral decubitus position.  Chest tube connections were prepped and cleaned. Alteplase/Dornase solution was drawn up in the appropriate syringe and medications were then injected into the chest tube, avoiding the introduction of air. Chest tube connection to pleurvac was then reconnected. Chest tube was left clamped/closed. Patient was instructed to lie in left lateral position x 1 hours (60 minutes on the left lateral decubitus position).  Chest tube will be placed back on suction in 1 hour.    RN notified.     Complications: none    F/U: routine chest tube care    Jennifer Mendez MD RD  Pulmonary and Critical Care    Available on Voalte

## 2023-04-20 ENCOUNTER — APPOINTMENT (OUTPATIENT)
Dept: RADIOLOGY | Facility: MEDICAL CENTER | Age: 43
DRG: 871 | End: 2023-04-20
Attending: INTERNAL MEDICINE
Payer: MEDICAID

## 2023-04-20 ENCOUNTER — APPOINTMENT (OUTPATIENT)
Dept: RADIOLOGY | Facility: MEDICAL CENTER | Age: 43
DRG: 871 | End: 2023-04-20
Attending: NURSE PRACTITIONER
Payer: MEDICAID

## 2023-04-20 PROBLEM — I10 PRIMARY HYPERTENSION: Status: ACTIVE | Noted: 2023-04-20

## 2023-04-20 LAB
A2 MACROGLOB SERPL-MCNC: 135 MG/DL (ref 131–293)
ALT SERPL-CCNC: 10 U/L (ref 5–40)
ANION GAP SERPL CALC-SCNC: 10 MMOL/L (ref 7–16)
ANNOTATION COMMENT IMP: ABNORMAL
AST SERPL-CCNC: 11 U/L (ref 9–40)
BACTERIA BLD CULT: NORMAL
BACTERIA BLD CULT: NORMAL
BACTERIA FLD AEROBE CULT: NORMAL
BASOPHILS # BLD AUTO: 0.2 % (ref 0–1.8)
BASOPHILS # BLD: 0.01 K/UL (ref 0–0.12)
BUN SERPL-MCNC: 7 MG/DL (ref 8–22)
BUN SERPL-SCNC: 4 MG/DL (ref 7–20)
CALCIUM SERPL-MCNC: 9 MG/DL (ref 8.5–10.5)
CHLORIDE SERPL-SCNC: 101 MMOL/L (ref 96–112)
CIRRHOMETER PT SCORE Q4850: 0
CO2 SERPL-SCNC: 24 MMOL/L (ref 20–33)
CREAT SERPL-MCNC: 0.52 MG/DL (ref 0.5–1.4)
EOSINOPHIL # BLD AUTO: 0.27 K/UL (ref 0–0.51)
EOSINOPHIL NFR BLD: 4.2 % (ref 0–6.9)
ERYTHROCYTE [DISTWIDTH] IN BLOOD BY AUTOMATED COUNT: 42.9 FL (ref 35.9–50)
FIBROSIS STAGE SERPL QL: ABNORMAL
GFR SERPLBLD CREATININE-BSD FMLA CKD-EPI: 118 ML/MIN/1.73 M 2
GGT SERPL-CCNC: 13 U/L (ref 7–33)
GLUCOSE BLD STRIP.AUTO-MCNC: 137 MG/DL (ref 65–99)
GLUCOSE BLD STRIP.AUTO-MCNC: 159 MG/DL (ref 65–99)
GLUCOSE BLD STRIP.AUTO-MCNC: 176 MG/DL (ref 65–99)
GLUCOSE BLD STRIP.AUTO-MCNC: 253 MG/DL (ref 65–99)
GLUCOSE SERPL-MCNC: 158 MG/DL (ref 65–99)
GRAM STN SPEC: NORMAL
HCT VFR BLD AUTO: 30.4 % (ref 37–47)
HGB BLD-MCNC: 9.3 G/DL (ref 12–16)
IMM GRANULOCYTES # BLD AUTO: 0.04 K/UL (ref 0–0.11)
IMM GRANULOCYTES NFR BLD AUTO: 0.6 % (ref 0–0.9)
INFLAMETER META CLASS Q4853: ABNORMAL
INFLAMETER PT SCORE Q4852: 0.16
LIVER FIBR SCORE SERPL CALC.FIBROMETER: 0.12
LYMPHOCYTES # BLD AUTO: 1.08 K/UL (ref 1–4.8)
LYMPHOCYTES NFR BLD: 16.6 % (ref 22–41)
MCH RBC QN AUTO: 25 PG (ref 27–33)
MCHC RBC AUTO-ENTMCNC: 30.6 G/DL (ref 33.6–35)
MCV RBC AUTO: 81.7 FL (ref 81.4–97.8)
MONOCYTES # BLD AUTO: 0.47 K/UL (ref 0–0.85)
MONOCYTES NFR BLD AUTO: 7.2 % (ref 0–13.4)
NEUTROPHILS # BLD AUTO: 4.62 K/UL (ref 2–7.15)
NEUTROPHILS NFR BLD: 71.2 % (ref 44–72)
NRBC # BLD AUTO: 0 K/UL
NRBC BLD-RTO: 0 /100 WBC
PATHOLOGY STUDY: ABNORMAL
PLATELET # BLD AUTO: 361 K/UL (ref 164–446)
PMV BLD AUTO: 9.6 FL (ref 9–12.9)
POTASSIUM SERPL-SCNC: 4.2 MMOL/L (ref 3.6–5.5)
PROTHROM ACT/NOR PPP: 95 % (ref 90–120)
RBC # BLD AUTO: 3.72 M/UL (ref 4.2–5.4)
SIGNIFICANT IND 70042: NORMAL
SITE SITE: NORMAL
SODIUM SERPL-SCNC: 135 MMOL/L (ref 135–145)
SOURCE SOURCE: NORMAL
WBC # BLD AUTO: 6.5 K/UL (ref 4.8–10.8)

## 2023-04-20 PROCEDURE — 700111 HCHG RX REV CODE 636 W/ 250 OVERRIDE (IP): Performed by: INTERNAL MEDICINE

## 2023-04-20 PROCEDURE — 32562 LYSE CHEST FIBRIN SUBQ DAY: CPT | Performed by: INTERNAL MEDICINE

## 2023-04-20 PROCEDURE — 99231 SBSQ HOSP IP/OBS SF/LOW 25: CPT | Performed by: NURSE PRACTITIONER

## 2023-04-20 PROCEDURE — 99232 SBSQ HOSP IP/OBS MODERATE 35: CPT | Mod: 25 | Performed by: INTERNAL MEDICINE

## 2023-04-20 PROCEDURE — 700105 HCHG RX REV CODE 258: Performed by: INTERNAL MEDICINE

## 2023-04-20 PROCEDURE — A9270 NON-COVERED ITEM OR SERVICE: HCPCS | Performed by: HOSPITALIST

## 2023-04-20 PROCEDURE — 82962 GLUCOSE BLOOD TEST: CPT | Mod: 91

## 2023-04-20 PROCEDURE — 700102 HCHG RX REV CODE 250 W/ 637 OVERRIDE(OP): Performed by: INTERNAL MEDICINE

## 2023-04-20 PROCEDURE — 770001 HCHG ROOM/CARE - MED/SURG/GYN PRIV*

## 2023-04-20 PROCEDURE — 3E0L3GC INTRODUCTION OF OTHER THERAPEUTIC SUBSTANCE INTO PLEURAL CAVITY, PERCUTANEOUS APPROACH: ICD-10-PCS | Performed by: INTERNAL MEDICINE

## 2023-04-20 PROCEDURE — 99232 SBSQ HOSP IP/OBS MODERATE 35: CPT | Performed by: INTERNAL MEDICINE

## 2023-04-20 PROCEDURE — 71045 X-RAY EXAM CHEST 1 VIEW: CPT

## 2023-04-20 PROCEDURE — A9270 NON-COVERED ITEM OR SERVICE: HCPCS | Performed by: INTERNAL MEDICINE

## 2023-04-20 PROCEDURE — 94669 MECHANICAL CHEST WALL OSCILL: CPT

## 2023-04-20 PROCEDURE — 700102 HCHG RX REV CODE 250 W/ 637 OVERRIDE(OP): Performed by: HOSPITALIST

## 2023-04-20 PROCEDURE — 36415 COLL VENOUS BLD VENIPUNCTURE: CPT

## 2023-04-20 PROCEDURE — 94760 N-INVAS EAR/PLS OXIMETRY 1: CPT

## 2023-04-20 PROCEDURE — 85025 COMPLETE CBC W/AUTO DIFF WBC: CPT

## 2023-04-20 PROCEDURE — 700101 HCHG RX REV CODE 250: Performed by: INTERNAL MEDICINE

## 2023-04-20 PROCEDURE — 71250 CT THORAX DX C-: CPT

## 2023-04-20 PROCEDURE — 700105 HCHG RX REV CODE 258: Performed by: HOSPITALIST

## 2023-04-20 PROCEDURE — 700111 HCHG RX REV CODE 636 W/ 250 OVERRIDE (IP): Performed by: HOSPITALIST

## 2023-04-20 PROCEDURE — 80048 BASIC METABOLIC PNL TOTAL CA: CPT

## 2023-04-20 RX ORDER — LOSARTAN POTASSIUM 50 MG/1
25 TABLET ORAL
Status: DISCONTINUED | OUTPATIENT
Start: 2023-04-20 | End: 2023-04-21 | Stop reason: HOSPADM

## 2023-04-20 RX ORDER — OXYCODONE HYDROCHLORIDE 10 MG/1
10 TABLET ORAL ONCE
Status: COMPLETED | OUTPATIENT
Start: 2023-04-20 | End: 2023-04-20

## 2023-04-20 RX ADMIN — INSULIN HUMAN 5 UNITS: 100 INJECTION, SOLUTION PARENTERAL at 20:56

## 2023-04-20 RX ADMIN — PIPERACILLIN AND TAZOBACTAM 4.5 G: 4; .5 INJECTION, POWDER, LYOPHILIZED, FOR SOLUTION INTRAVENOUS; PARENTERAL at 06:24

## 2023-04-20 RX ADMIN — ALTEPLASE 5 MG: KIT at 11:41

## 2023-04-20 RX ADMIN — TRAZODONE HYDROCHLORIDE 50 MG: 50 TABLET ORAL at 20:36

## 2023-04-20 RX ADMIN — INSULIN HUMAN 2 UNITS: 100 INJECTION, SOLUTION PARENTERAL at 08:52

## 2023-04-20 RX ADMIN — GUAIFENESIN 600 MG: 600 TABLET, EXTENDED RELEASE ORAL at 17:22

## 2023-04-20 RX ADMIN — INSULIN HUMAN 2 UNITS: 100 INJECTION, SOLUTION PARENTERAL at 11:35

## 2023-04-20 RX ADMIN — KETOROLAC TROMETHAMINE 30 MG: 30 INJECTION, SOLUTION INTRAMUSCULAR; INTRAVENOUS at 06:15

## 2023-04-20 RX ADMIN — GUAIFENESIN 600 MG: 600 TABLET, EXTENDED RELEASE ORAL at 06:15

## 2023-04-20 RX ADMIN — Medication 1 APPLICATOR: at 17:22

## 2023-04-20 RX ADMIN — DORNASE ALFA 5 MG: 1 SOLUTION RESPIRATORY (INHALATION) at 11:41

## 2023-04-20 RX ADMIN — OXYCODONE HYDROCHLORIDE 10 MG: 10 TABLET ORAL at 10:22

## 2023-04-20 RX ADMIN — DOCUSATE SODIUM 50 MG AND SENNOSIDES 8.6 MG 2 TABLET: 8.6; 5 TABLET, FILM COATED ORAL at 17:23

## 2023-04-20 RX ADMIN — PIPERACILLIN AND TAZOBACTAM 4.5 G: 4; .5 INJECTION, POWDER, LYOPHILIZED, FOR SOLUTION INTRAVENOUS; PARENTERAL at 22:21

## 2023-04-20 RX ADMIN — LOSARTAN POTASSIUM 25 MG: 50 TABLET, FILM COATED ORAL at 08:50

## 2023-04-20 RX ADMIN — Medication 5 MG: at 20:36

## 2023-04-20 RX ADMIN — PIPERACILLIN AND TAZOBACTAM 4.5 G: 4; .5 INJECTION, POWDER, LYOPHILIZED, FOR SOLUTION INTRAVENOUS; PARENTERAL at 15:08

## 2023-04-20 RX ADMIN — Medication 1 APPLICATOR: at 06:16

## 2023-04-20 RX ADMIN — DOCUSATE SODIUM 50 MG AND SENNOSIDES 8.6 MG 2 TABLET: 8.6; 5 TABLET, FILM COATED ORAL at 06:15

## 2023-04-20 RX ADMIN — OXYCODONE 5 MG: 5 TABLET ORAL at 18:20

## 2023-04-20 ASSESSMENT — ENCOUNTER SYMPTOMS
DIZZINESS: 0
HEADACHES: 0
FEVER: 0
NAUSEA: 0
ABDOMINAL PAIN: 0
MYALGIAS: 1
HEARTBURN: 0
WEAKNESS: 0
COUGH: 0
SHORTNESS OF BREATH: 0

## 2023-04-20 ASSESSMENT — PAIN DESCRIPTION - PAIN TYPE
TYPE: ACUTE PAIN

## 2023-04-20 NOTE — PROCEDURES
Procedure Date: 4/19/2023    Procedure: Intrapleual Fibrinolytics (74407)    Physician: Jennifer Mendez MD RD    Anesthesia Type: N/A    Indication: complicated parapneumonic pleural effusion    Time Out: Patient was identified with two patient identifiers and site was confirmed.    Pre-Op Dx: complicated parapneumonic pleural effusion    Post-Op Dx: complicated parapneumonic pleural effusion    Medications:   Alteplase 5mg  Dornase Alpha 5mg     Description:    5mg of Alteplase and 5mg of Dornase were prepared by pharmacy in normal saline and maintained chilled until administration.  These solutions were mixed at bedside and the total fluid instillation was 50cc.  Patient was positioned in left lateral decubitus position.  Chest tube connections were prepped and cleaned. Alteplase/Dornase solution was drawn up in the appropriate syringe and medications were then injected into the chest tube, avoiding the introduction of air. Chest tube connection to pleurvac was then reconnected. Chest tube was left clamped/closed. Patient was instructed to lie in left lateral position x 1 hours (60 minutes on the left lateral decubitus position).  Chest tube will be placed back on suction in 1 hour.    RN notified.     Complications: none    F/U: routine chest tube care    Jennifer Mendez MD RD  Pulmonary and Critical Care    Available on Voalte

## 2023-04-20 NOTE — PROGRESS NOTES
"  DATE: 4/20/2023 4/16 surgical consultation for evaluation of parapneumonic effusion.  4/17 IR right chest tube placement    INTERVAL EVENTS:    AM CXR stable.  Chest tube to suction with no air leak.  Intrapleural fibrinolytic therapy to complete today at 0900.    - CT chest for reassessment of parapneumonic effusion status post drain placement and fibrinolytic therapy.    REVIEW OF SYSTEMS:  Comprehensive review of systems is negative with the exception of the aforementioned HPI, PMH, and PSH bullets in accordance with CMS guidelines.    PHYSICAL EXAMINATION:    Vital Signs: BP (!) 140/75   Pulse 89   Temp 36.8 °C (98.2 °F) (Temporal)   Resp 16   Ht 1.727 m (5' 8\")   Wt (!) 128 kg (281 lb 12 oz)   SpO2 94%     Physical Exam  Vitals and nursing note reviewed.   Constitutional:       General: She is not in acute distress.     Appearance: She is not ill-appearing.   HENT:      Mouth: Mucous membranes are moist.   Eyes:      General:         Right eye: No discharge.         Left eye: No discharge.      Extraocular Movements: Extraocular movements intact.   Pulmonary:      Effort: Pulmonary effort is normal. No respiratory distress.      Right IR chest tube to suction with no air leak.  Abdominal:      General: There is no distension.      Palpations: Abdomen is soft.      Tenderness: There is no abdominal tenderness.   Skin:     General: Skin is warm and dry.      Capillary Refill: Capillary refill takes less than 2 seconds.   Neurological:      Mental Status: She is alert and oriented to person, place, and time.   Psychiatric:         Behavior: Behavior normal.         Thought Content: Thought content normal.    LABORATORY VALUES:   Recent Labs     04/18/23  0438 04/19/23  1144 04/20/23  0624   WBC 8.0 7.1 6.5   RBC 3.57* 3.74* 3.72*   HEMOGLOBIN 9.1* 9.6* 9.3*   HEMATOCRIT 29.1* 30.6* 30.4*   MCV 81.5 81.8 81.7   MCH 25.5* 25.7* 25.0*   MCHC 31.3* 31.4* 30.6*   RDW 43.2 43.3 42.9   PLATELETCT 267 314 361 "   MPV 10.4 9.8 9.6     Recent Labs     04/18/23  0438 04/19/23  1144 04/20/23  0624   SODIUM 135 133* 135   POTASSIUM 4.4 4.5 4.2   CHLORIDE 100 102 101   CO2 27 25 24   GLUCOSE 212* 246* 158*   BUN 8 8 7*   CREATININE 0.53 0.59 0.52   CALCIUM 8.7 8.7 9.0     Recent Labs     04/18/23  0438 04/19/23  1144   ASTSGOT 9* 7*   ALTSGPT 11 7   TBILIRUBIN 0.2 <0.2   ALKPHOSPHAT 99 90   GLOBULIN 3.6* 3.5            IMAGING:   DX-CHEST-PORTABLE (1 VIEW)   Final Result         1.  Bilateral lower lobe infiltrates, greater on the right, stable since prior study.   2.  Trace bilateral pleural effusions      DX-CHEST-LIMITED (1 VIEW)   Final Result      1.  Wedge-shaped opacity in right lung base consistent with atelectasis or pneumonitis.      2.  Blunting right costophrenic angle consistent with pleural effusion and/or pleural inflammation.      3.  Locking loop catheter overlying the right lung base with no evidence of pneumothorax.      IR-CHEST TUBE-EMPYEMA RIGHT   Final Result         1. Ultrasound and fluoroscopy guided  Right  12 [place] Pigtail Chest Tube Placement For Possible Empyema Versus Loculated Pleural Effusion.         CT-CTA CHEST PULMONARY ARTERY W/ RECONS   Final Result         1. No CT evidence of pulmonary embolism.      2. Moderate loculated right pleural effusion.      3. Patchy airspace opacity in the right middle lobe and right lower lobe, likely pneumonia.      DX-CHEST-PORTABLE (1 VIEW)   Final Result         Worsening airspace opacity in the right lower lung.   Loculated right pleural effusion, worse than prior.      CT-CHEST (THORAX) W/O    (Results Pending)       ASSESSMENT AND PLAN:     * Parapneumonic effusion- (present on admission)  Assessment & Plan  4/17 Right chest tube placement for loculated effusion/empyema.  4/18 through 4/20 Intrapleual fibrinolytics with pulmonology.   4/20 CT chest.   ABX per medicine.   ACS gray.          ____________________________________     Ken Wadsworth  BRITNEY    DD: 4/20/2023  8:06 AM

## 2023-04-20 NOTE — CARE PLAN
The patient is Stable - Low risk of patient condition declining or worsening    Shift Goals  Clinical Goals: Pain control, FSBG  Patient Goals: Pain control, rest    Progress made toward(s) clinical / shift goals:  Pain managed with prescribed medications. FSBG monitored and insulin given per parameters. Patient resting comfortably this shift.    Problem: Knowledge Deficit - Standard  Goal: Patient and family/care givers will demonstrate understanding of plan of care, disease process/condition, diagnostic tests and medications  Outcome: Progressing     Problem: Respiratory  Goal: Patient will achieve/maintain optimum respiratory ventilation and gas exchange  Outcome: Progressing     Problem: Pain - Standard  Goal: Alleviation of pain or a reduction in pain to the patient’s comfort goal  Outcome: Progressing       Patient is not progressing towards the following goals:

## 2023-04-20 NOTE — PROGRESS NOTES
Pulmonary Progress Note    Date of admission  4/15/2023    Chief Complaint  42 y.o. female admitted 4/15/2023 with parapneumonic effusion    Hospital Course  42 y.o. female who presented 4/15/2023 with a history of substance abuse (meth and tobacco) with productive cough, pleuritic chest pain and fevers for a week.  Surgery was consulted on admission and recommended an IR chest tube to drain lytics. Initially pleural fluid was not sent for studies but the surgery team was able to get the fluid sent after and it is consistent with a parapneumonic effusion.  Cultures negative.      Interval Problem Update  Reviewed last 24 hour events:  4/17/23: Lytic dose 1, minimal output  4/18/23: Lytic doses 2 and 3, 1L output  4/19/23: Lytic doses 4 and 5 today  4/20/23: Lytic dose 6 this morning    Review of Systems  Review of Systems   Constitutional:  Positive for malaise/fatigue. Negative for fever.   HENT:  Negative for congestion.    Respiratory:  Negative for cough and shortness of breath.    Cardiovascular:  Positive for chest pain. Negative for leg swelling.   Gastrointestinal:  Negative for heartburn.   Neurological:  Negative for dizziness and headaches.      Vital Signs for last 24 hours   Temp:  [36.3 °C (97.3 °F)-37.2 °C (99 °F)] 36.9 °C (98.4 °F)  Pulse:  [] 91  Resp:  [16-17] 16  BP: (140-172)/(75-96) 143/80  SpO2:  [90 %-94 %] 94 %    Hemodynamic parameters for last 24 hours       Respiratory Information for the last 24 hours       Physical Exam   Physical Exam  Constitutional:       Appearance: Normal appearance.   HENT:      Head: Atraumatic.   Cardiovascular:      Rate and Rhythm: Normal rate and regular rhythm.      Heart sounds: Normal heart sounds.   Pulmonary:      Effort: Pulmonary effort is normal. No respiratory distress.      Breath sounds: Normal breath sounds. No wheezing or rales.      Comments: Right sided chest tube in place  Abdominal:      Palpations: Abdomen is soft.   Musculoskeletal:          General: No swelling, tenderness or deformity.   Skin:     General: Skin is warm and dry.   Neurological:      General: No focal deficit present.      Mental Status: She is alert and oriented to person, place, and time.       Medications  Current Facility-Administered Medications   Medication Dose Route Frequency Provider Last Rate Last Admin    Nozin nasal  swab  1 Applicator Each Nostril BID Syd Miller M.D.   1 Applicator at 04/20/23 0616    losartan (COZAAR) tablet 25 mg  25 mg Oral Q DAY Syd Miller M.D.   25 mg at 04/20/23 0850    alteplase (Activase) 5 mg in NS 30 mL INTRAPLEURAL syringe  5 mg Intrapleural TWICE DAILY Jennifer Mendez M.D.   5 mg at 04/19/23 1723    And    dornase alpha (PULMOZYME) 5 mg in NS 30 mL INTRAPLEURAL syringe  5 mg Intrapleural TWICE DAILY Jennifer Mendez M.D.   5 mg at 04/19/23 1723    melatonin tablet 5 mg  5 mg Oral Nightly Syd Miller M.D.   5 mg at 04/19/23 2135    traZODone (DESYREL) tablet 50 mg  50 mg Oral QHS Syd Miller M.D.   50 mg at 04/19/23 2135    insulin GLARGINE (Lantus,Semglee) injection  15 Units Subcutaneous BID Syd Miller M.D.   15 Units at 04/20/23 0855    ketorolac (TORADOL) injection 30 mg  30 mg Intravenous Q6HRS PRN Mode Garcia M.D.   30 mg at 04/20/23 0615    oxyCODONE immediate-release (ROXICODONE) tablet 5 mg  5 mg Oral Q4HRS PRN Mode Garcia M.D.   5 mg at 04/19/23 0619    piperacillin-tazobactam (Zosyn) 4.5 g in  mL IVPB  4.5 g Intravenous Q8HRS Mode Garcia M.D.   Stopped at 04/20/23 1024    insulin regular (HumuLIN R,NovoLIN R) injection  2-9 Units Subcutaneous 4X/DAY ACHMARIE Garcia M.D.   2 Units at 04/20/23 1135    And    dextrose 10 % BOLUS 25 g  25 g Intravenous Q15 MIN PRN Mode Garcia M.D.        guaiFENesin ER (MUCINEX) tablet 600 mg  600 mg Oral Q12HRS Mode Garcia M.D.   600 mg at 04/20/23 0615    senna-docusate (PERICOLACE or SENOKOT S) 8.6-50 MG per tablet 2 Tablet  2 Tablet Oral BID Mode Garcia M.D.   2  Tablet at 04/20/23 0615    And    polyethylene glycol/lytes (MIRALAX) PACKET 1 Packet  1 Packet Oral QDAY PRN Mode Garcia M.D.        And    magnesium hydroxide (MILK OF MAGNESIA) suspension 30 mL  30 mL Oral QDAY PRN Mode Garcia M.D.        And    bisacodyl (DULCOLAX) suppository 10 mg  10 mg Rectal QDAY PRN Mode Garcia M.D.        Respiratory Therapy Consult   Nebulization Continuous RT Mode Garcia M.D.        acetaminophen (Tylenol) tablet 650 mg  650 mg Oral Q6HRS PRN Mode Garcia M.D.   650 mg at 04/18/23 0452    labetalol (NORMODYNE/TRANDATE) injection 10 mg  10 mg Intravenous Q4HRS PRTODD Garcia M.D.        ondansetron (ZOFRAN) syringe/vial injection 4 mg  4 mg Intravenous Q4HRS PRN Mode Garcia M.D.        ondansetron (ZOFRAN ODT) dispertab 4 mg  4 mg Oral Q4HRS PRTODD Garcia M.D.        promethazine (PHENERGAN) tablet 12.5-25 mg  12.5-25 mg Oral Q4HRS ANDRES Garcia M.D.        promethazine (PHENERGAN) suppository 12.5-25 mg  12.5-25 mg Rectal Q4HRS PRTODD Garcia M.D.        prochlorperazine (COMPAZINE) injection 5-10 mg  5-10 mg Intravenous Q4HRS PRTODD Garcia M.D.        guaiFENesin dextromethorphan (ROBITUSSIN DM) 100-10 MG/5ML syrup 10 mL  10 mL Oral Q6HRS PRN Mode Garcia M.D.           Fluids    Intake/Output Summary (Last 24 hours) at 4/20/2023 1140  Last data filed at 4/20/2023 0415  Gross per 24 hour   Intake 1862 ml   Output 365 ml   Net 1497 ml         Laboratory          Recent Labs     04/18/23  0438 04/19/23  1144 04/20/23  0624   SODIUM 135 133* 135   POTASSIUM 4.4 4.5 4.2   CHLORIDE 100 102 101   CO2 27 25 24   BUN 8 8 7*   CREATININE 0.53 0.59 0.52   CALCIUM 8.7 8.7 9.0       Recent Labs     04/18/23 0438 04/19/23  1144 04/20/23  0624   ALTSGPT 11 7  --    ASTSGOT 9* 7*  --    ALKPHOSPHAT 99 90  --    TBILIRUBIN 0.2 <0.2  --    GLUCOSE 212* 246* 158*       Recent Labs     04/18/23 0438 04/19/23  1144 04/20/23  0624   WBC 8.0 7.1 6.5   NEUTSPOLYS  --  72.10*  71.20   LYMPHOCYTES  --  15.20* 16.60*   MONOCYTES  --  6.90 7.20   EOSINOPHILS  --  4.20 4.20   BASOPHILS  --  0.60 0.20   ASTSGOT 9* 7*  --    ALTSGPT 11 7  --    ALKPHOSPHAT 99 90  --    TBILIRUBIN 0.2 <0.2  --        Recent Labs     04/18/23  0438 04/19/23  1144 04/20/23  0624   RBC 3.57* 3.74* 3.72*   HEMOGLOBIN 9.1* 9.6* 9.3*   HEMATOCRIT 29.1* 30.6* 30.4*   PLATELETCT 267 314 361         Imaging  X-Ray:  I have personally reviewed the images and compared with prior images.  CT:    Reviewed    Assessment/Plan  #Parapneumonic effusion  Plan:  - Pleural cultures negative  - Lytics started 4/17, Today will be dose 6 - COMPLETE  - Plan for CT this afternoon  - Monitor I:O  - Daily CXR - improved this am  - Surgery is already following, will review CT and manage going forward if effusion is not resolved  - Please feel free to discuss the case if needed, available on voalte  - Pulmonary to sign off, Please do not hesitate to reconsult if further questions arise.       Jennifer Mendez MD RD  Pulmonary and Critical Care     Available on Voalte

## 2023-04-20 NOTE — CARE PLAN
The patient is Stable - Low risk of patient condition declining or worsening    Shift Goals  Clinical Goals: pain control, ambulation  Patient Goals: Pain control, rest    Progress made toward(s) clinical / shift goals:  PRN medications administered for complaints of pain.  Encouraged patent to ambulate.     Patient is not progressing towards the following goals: N/A        Problem: Knowledge Deficit - Standard  Goal: Patient and family/care givers will demonstrate understanding of plan of care, disease process/condition, diagnostic tests and medications  Outcome: Met     Problem: Pain - Standard  Goal: Alleviation of pain or a reduction in pain to the patient’s comfort goal  Outcome: Met

## 2023-04-20 NOTE — PROCEDURES
Procedure Date: 4/20/2023    Procedure: Intrapleual Fibrinolytics (74581)    Physician: Jennifer Mendez MD RD    Anesthesia Type: N/A    Indication: complicated parapneumonic pleural effusion    Time Out: Patient was identified with two patient identifiers and site was confirmed.    Pre-Op Dx: complicated parapneumonic pleural effusion    Post-Op Dx: complicated parapneumonic pleural effusion    Medications:   Alteplase 5mg  Dornase Alpha 5mg     Description:    5mg of Alteplase and 5mg of Dornase were prepared by pharmacy in normal saline and maintained chilled until administration.  These solutions were mixed at bedside and the total fluid instillation was 50cc.  Patient was positioned in left lateral decubitus position.  Chest tube connections were prepped and cleaned. Alteplase/Dornase solution was drawn up in the appropriate syringe and medications were then injected into the chest tube, avoiding the introduction of air. Chest tube connection to pleurvac was then reconnected. Chest tube was left clamped/closed. Patient was instructed to lie in right lateral position as close to prone as posisble x 1 hours (60 minutes on the right lateral decubitus position).  Chest tube will be placed back on suction in 1 hour.    RN notified.     Complications: none    F/U: routine chest tube care    Jennifer Mendez MD RD  Pulmonary and Critical Care    Available on [facility]

## 2023-04-20 NOTE — DOCUMENTATION QUERY
[facility]                                                                       Query Response Note      PATIENT:               CARRIE WEBSTER  ACCT #:                  [id number]  MRN:                     [id number]  :                      1980  ADMIT DATE:       4/15/2023 7:50 PM  DISCH DATE:          RESPONDING  PROVIDER #:        [id number]           QUERY TEXT:    BMI 42.84 is documented in the Medical Record.  Can a diagnosis be provided to support this finding?    The patient's Clinical Indicators include:   Dietary Eval:  BMI 42.84, Class III Obesity   Risk Factors: DM  Treatment: dietary eval, recommend referral to outpatient nutrition services at [place] if appropriate    Thank you,  Phong Loco RN, BSN  Clinical   Connect via [facility]  Options provided:   -- Obese   -- Morbidly obese   -- Overweight   -- Findings of no clinical significance   -- Other explanation, please specify   -- Unable to determine      Query created by: Phong Loco on 2023 9:47 AM    RESPONSE TEXT:    Morbidly obese          Electronically signed by:  ZULEYMA DICKSON MD 2023 11:19 AM

## 2023-04-20 NOTE — PROGRESS NOTES
Bedside report received. Assessment complete.  A&O x 4. Patient calls appropriately. Patient ambulates with standby assist. Patient has 4/10 pain. Declines pharmacologic  interventions at this time. Comfort measures provided.  Medicated per MAR. Denies N&V. Patient on NPO for CT.       R chest tube in place, DIP, (-) air leak on low continuous suction.      + void, + flatus, Last BM was 04/17.  Patient denies SOB.  SCD's refused.     Patient is pleasant and cooperative with plan of care.Review plan with of care with patient. Call light and personal belongings within reach. Hourly rounding in place. All needs met at this time.

## 2023-04-20 NOTE — DIETARY
Nutrition Services: DM Education Consult   Day 5 of admit.  Jc Ramos is a 42 y.o. female with admitting DX of Parapneumonic effusion [J18.9, J91.8]    RD able to visit pt at bedside to provide DM diet education. RD discussed CHO sources, CHO counting, and label reading, provided handout reinforcing topics discussed. Pt demonstrated appropriate readiness and expressed evidence of learning. RD able to answer all questions to patient's satisfaction.     No other education needs identified at this time. Consider referral to outpatient nutrition services for continuation of education as indicated or per pt preferences.     Please re-consult RD as indicated.

## 2023-04-20 NOTE — PROGRESS NOTES
Received report from previous shift RN.  Assessment complete.  A&O x 4. Patient calls appropriately.  Patient ambulates independently.  Patient denies SOB.  Patient has 8/10 pain. Pain managed per MAR.  Denies N&V. Tolerating CHO diet.  R Chest tube to -20 suction, dressing CDI.  + void, + flatus, last BM 4/17.  Patient pleasant and cooperative with plan of care.  Call light and personal belongings with in reach. Hourly rounding in place. All needs met at this time.

## 2023-04-21 ENCOUNTER — PHARMACY VISIT (OUTPATIENT)
Dept: PHARMACY | Facility: MEDICAL CENTER | Age: 43
End: 2023-04-21
Payer: COMMERCIAL

## 2023-04-21 ENCOUNTER — APPOINTMENT (OUTPATIENT)
Dept: RADIOLOGY | Facility: MEDICAL CENTER | Age: 43
DRG: 871 | End: 2023-04-21
Attending: NURSE PRACTITIONER
Payer: MEDICAID

## 2023-04-21 VITALS
BODY MASS INDEX: 42.7 KG/M2 | OXYGEN SATURATION: 94 % | SYSTOLIC BLOOD PRESSURE: 149 MMHG | TEMPERATURE: 98.4 F | HEART RATE: 103 BPM | RESPIRATION RATE: 17 BRPM | DIASTOLIC BLOOD PRESSURE: 94 MMHG | HEIGHT: 68 IN | WEIGHT: 281.75 LBS

## 2023-04-21 LAB
ANION GAP SERPL CALC-SCNC: 10 MMOL/L (ref 7–16)
BASOPHILS # BLD AUTO: 0.3 % (ref 0–1.8)
BASOPHILS # BLD: 0.02 K/UL (ref 0–0.12)
BUN SERPL-MCNC: 8 MG/DL (ref 8–22)
CALCIUM SERPL-MCNC: 8.7 MG/DL (ref 8.5–10.5)
CHLORIDE SERPL-SCNC: 102 MMOL/L (ref 96–112)
CO2 SERPL-SCNC: 22 MMOL/L (ref 20–33)
CREAT SERPL-MCNC: 0.6 MG/DL (ref 0.5–1.4)
EOSINOPHIL # BLD AUTO: 0.28 K/UL (ref 0–0.51)
EOSINOPHIL NFR BLD: 4.3 % (ref 0–6.9)
ERYTHROCYTE [DISTWIDTH] IN BLOOD BY AUTOMATED COUNT: 43.5 FL (ref 35.9–50)
GFR SERPLBLD CREATININE-BSD FMLA CKD-EPI: 114 ML/MIN/1.73 M 2
GLUCOSE BLD STRIP.AUTO-MCNC: 189 MG/DL (ref 65–99)
GLUCOSE BLD STRIP.AUTO-MCNC: 193 MG/DL (ref 65–99)
GLUCOSE BLD STRIP.AUTO-MCNC: 212 MG/DL (ref 65–99)
GLUCOSE SERPL-MCNC: 213 MG/DL (ref 65–99)
HCT VFR BLD AUTO: 31 % (ref 37–47)
HGB BLD-MCNC: 9.5 G/DL (ref 12–16)
IMM GRANULOCYTES # BLD AUTO: 0.05 K/UL (ref 0–0.11)
IMM GRANULOCYTES NFR BLD AUTO: 0.8 % (ref 0–0.9)
LYMPHOCYTES # BLD AUTO: 1.21 K/UL (ref 1–4.8)
LYMPHOCYTES NFR BLD: 18.6 % (ref 22–41)
MCH RBC QN AUTO: 25.1 PG (ref 27–33)
MCHC RBC AUTO-ENTMCNC: 30.6 G/DL (ref 33.6–35)
MCV RBC AUTO: 82 FL (ref 81.4–97.8)
MONOCYTES # BLD AUTO: 0.47 K/UL (ref 0–0.85)
MONOCYTES NFR BLD AUTO: 7.2 % (ref 0–13.4)
NEUTROPHILS # BLD AUTO: 4.48 K/UL (ref 2–7.15)
NEUTROPHILS NFR BLD: 68.8 % (ref 44–72)
NRBC # BLD AUTO: 0 K/UL
NRBC BLD-RTO: 0 /100 WBC
PLATELET # BLD AUTO: 377 K/UL (ref 164–446)
PMV BLD AUTO: 9.9 FL (ref 9–12.9)
POTASSIUM SERPL-SCNC: 4.4 MMOL/L (ref 3.6–5.5)
RBC # BLD AUTO: 3.78 M/UL (ref 4.2–5.4)
SODIUM SERPL-SCNC: 134 MMOL/L (ref 135–145)
WBC # BLD AUTO: 6.5 K/UL (ref 4.8–10.8)

## 2023-04-21 PROCEDURE — 71045 X-RAY EXAM CHEST 1 VIEW: CPT

## 2023-04-21 PROCEDURE — 82962 GLUCOSE BLOOD TEST: CPT | Mod: 91

## 2023-04-21 PROCEDURE — 700105 HCHG RX REV CODE 258: Performed by: HOSPITALIST

## 2023-04-21 PROCEDURE — 99239 HOSP IP/OBS DSCHRG MGMT >30: CPT | Performed by: INTERNAL MEDICINE

## 2023-04-21 PROCEDURE — 80048 BASIC METABOLIC PNL TOTAL CA: CPT

## 2023-04-21 PROCEDURE — 700102 HCHG RX REV CODE 250 W/ 637 OVERRIDE(OP): Performed by: INTERNAL MEDICINE

## 2023-04-21 PROCEDURE — RXMED WILLOW AMBULATORY MEDICATION CHARGE: Performed by: INTERNAL MEDICINE

## 2023-04-21 PROCEDURE — 85025 COMPLETE CBC W/AUTO DIFF WBC: CPT

## 2023-04-21 PROCEDURE — 700102 HCHG RX REV CODE 250 W/ 637 OVERRIDE(OP): Performed by: HOSPITALIST

## 2023-04-21 PROCEDURE — 700111 HCHG RX REV CODE 636 W/ 250 OVERRIDE (IP): Performed by: HOSPITALIST

## 2023-04-21 PROCEDURE — A9270 NON-COVERED ITEM OR SERVICE: HCPCS | Performed by: HOSPITALIST

## 2023-04-21 PROCEDURE — 99231 SBSQ HOSP IP/OBS SF/LOW 25: CPT | Performed by: NURSE PRACTITIONER

## 2023-04-21 PROCEDURE — A9270 NON-COVERED ITEM OR SERVICE: HCPCS | Performed by: INTERNAL MEDICINE

## 2023-04-21 PROCEDURE — 36415 COLL VENOUS BLD VENIPUNCTURE: CPT

## 2023-04-21 RX ORDER — BLOOD-GLUCOSE METER
EACH MISCELLANEOUS
Qty: 1 KIT | Refills: 0 | Status: SHIPPED | OUTPATIENT
Start: 2023-04-21 | End: 2023-08-22

## 2023-04-21 RX ORDER — LANCETS 30 GAUGE
EACH MISCELLANEOUS
Qty: 100 EACH | Refills: 0 | Status: SHIPPED | OUTPATIENT
Start: 2023-04-21 | End: 2023-08-22

## 2023-04-21 RX ORDER — INSULIN GLARGINE 100 [IU]/ML
15 INJECTION, SOLUTION SUBCUTANEOUS 2 TIMES DAILY
Qty: 9 ML | Refills: 2 | Status: ACTIVE | OUTPATIENT
Start: 2023-04-21 | End: 2023-08-22

## 2023-04-21 RX ORDER — BLOOD SUGAR DIAGNOSTIC
STRIP MISCELLANEOUS
Qty: 100 STRIP | Refills: 0 | Status: SHIPPED | OUTPATIENT
Start: 2023-04-21 | End: 2023-08-22

## 2023-04-21 RX ORDER — LOSARTAN POTASSIUM 25 MG/1
25 TABLET ORAL DAILY
Qty: 30 TABLET | Refills: 1 | Status: SHIPPED | OUTPATIENT
Start: 2023-04-22 | End: 2023-08-22

## 2023-04-21 RX ORDER — GLUCOSAMINE HCL/CHONDROITIN SU 500-400 MG
CAPSULE ORAL
Qty: 100 EACH | Refills: 0 | Status: SHIPPED | OUTPATIENT
Start: 2023-04-21 | End: 2023-08-22

## 2023-04-21 RX ORDER — AMOXICILLIN AND CLAVULANATE POTASSIUM 875; 125 MG/1; MG/1
1 TABLET, FILM COATED ORAL 2 TIMES DAILY
Qty: 28 TABLET | Refills: 0 | Status: ACTIVE | OUTPATIENT
Start: 2023-04-21 | End: 2023-05-05

## 2023-04-21 RX ADMIN — PIPERACILLIN AND TAZOBACTAM 4.5 G: 4; .5 INJECTION, POWDER, LYOPHILIZED, FOR SOLUTION INTRAVENOUS; PARENTERAL at 05:32

## 2023-04-21 RX ADMIN — DOCUSATE SODIUM 50 MG AND SENNOSIDES 8.6 MG 2 TABLET: 8.6; 5 TABLET, FILM COATED ORAL at 05:24

## 2023-04-21 RX ADMIN — LOSARTAN POTASSIUM 25 MG: 50 TABLET, FILM COATED ORAL at 05:23

## 2023-04-21 RX ADMIN — INSULIN HUMAN 2 UNITS: 100 INJECTION, SOLUTION PARENTERAL at 11:55

## 2023-04-21 RX ADMIN — GUAIFENESIN 600 MG: 600 TABLET, EXTENDED RELEASE ORAL at 05:24

## 2023-04-21 RX ADMIN — INSULIN HUMAN 2 UNITS: 100 INJECTION, SOLUTION PARENTERAL at 08:27

## 2023-04-21 RX ADMIN — PIPERACILLIN AND TAZOBACTAM 4.5 G: 4; .5 INJECTION, POWDER, LYOPHILIZED, FOR SOLUTION INTRAVENOUS; PARENTERAL at 13:36

## 2023-04-21 RX ADMIN — Medication 1 APPLICATOR: at 17:55

## 2023-04-21 RX ADMIN — DOCUSATE SODIUM 50 MG AND SENNOSIDES 8.6 MG 2 TABLET: 8.6; 5 TABLET, FILM COATED ORAL at 17:55

## 2023-04-21 RX ADMIN — GUAIFENESIN 600 MG: 600 TABLET, EXTENDED RELEASE ORAL at 17:55

## 2023-04-21 RX ADMIN — INSULIN HUMAN 3 UNITS: 100 INJECTION, SOLUTION PARENTERAL at 18:11

## 2023-04-21 RX ADMIN — Medication 1 APPLICATOR: at 05:25

## 2023-04-21 ASSESSMENT — ENCOUNTER SYMPTOMS
ABDOMINAL PAIN: 0
MYALGIAS: 0
WEAKNESS: 0
COUGH: 0
NAUSEA: 0
SHORTNESS OF BREATH: 0

## 2023-04-21 ASSESSMENT — PAIN DESCRIPTION - PAIN TYPE
TYPE: ACUTE PAIN

## 2023-04-21 NOTE — PROGRESS NOTES
Diabetes education: Met with pt this afternoon. Please see consult note.  Plan: Handout given on insulin pens. Pt will need Basaglar insulin pens, Admelog vials and syringes with sliding scale written out and for ac only, pen needles, One touch verio flex meter, strips and lancets at discharge. Please use diabetes supplies, F2 for correct meter, and dx code. Please send to [facility] at discharge. CDE to follow for questions on Monday.

## 2023-04-21 NOTE — PROGRESS NOTES
"  DATE: 4/21/2023 4/16 surgical consultation for evaluation of parapneumonic effusion.  4/17 IR right chest tube placement    INTERVAL EVENTS:  Patient very upset this am and stating she will leave AMA if the chest tube is not removed today.  Explained in detail the safety of chest tube management and risk of complications if removed too quickly  Patient agreed to chest XRay this afternoon after 8 hours of water sealed.  Possible removal depending on chest xray  Counseled   No drainage from chest tube overnight.   PHYSICAL EXAMINATION:  Vital Signs: BP (!) 144/81   Pulse 96   Temp 37 °C (98.6 °F) (Temporal)   Resp 17   Ht 1.727 m (5' 8\")   Wt (!) 128 kg (281 lb 12 oz)   SpO2 95%     Vitals obtained by patient:    Physical Exam:  Constitutional: Alert, no distress, well-groomed.  Skin: No rashes in visible areas.  Eye: Round. Conjunctiva clear, lids normal. No icterus.   Chest: Chest tube to water seal. No air leak noted   Neck: No masses, no thyromegaly. Moves freely without pain.  Respiratory: Unlabored respiratory effort, no cough or audible wheeze  Psych: Alert and oriented x3, normal affect and mood.       LABORATORY VALUES:   Recent Labs     04/19/23  1144 04/20/23  0624 04/21/23  0404   WBC 7.1 6.5 6.5   RBC 3.74* 3.72* 3.78*   HEMOGLOBIN 9.6* 9.3* 9.5*   HEMATOCRIT 30.6* 30.4* 31.0*   MCV 81.8 81.7 82.0   MCH 25.7* 25.0* 25.1*   MCHC 31.4* 30.6* 30.6*   RDW 43.3 42.9 43.5   PLATELETCT 314 361 377   MPV 9.8 9.6 9.9     Recent Labs     04/19/23  1144 04/20/23  0624 04/21/23  0404   SODIUM 133* 135 134*   POTASSIUM 4.5 4.2 4.4   CHLORIDE 102 101 102   CO2 25 24 22   GLUCOSE 246* 158* 213*   BUN 8 7* 8   CREATININE 0.59 0.52 0.60   CALCIUM 8.7 9.0 8.7     Recent Labs     04/19/23  1144   ASTSGOT 7*   ALTSGPT 7   TBILIRUBIN <0.2   ALKPHOSPHAT 90   GLOBULIN 3.5            IMAGING:   DX-CHEST-LIMITED (1 VIEW)   Final Result         1.  Bilateral lower lobe infiltrates, greater on the right, stable since " prior study.   2.  Trace bilateral pleural effusions, increased since prior study      CT-CHEST (THORAX) W/O   Final Result         1. Loculated lateral component in the right pleural effusion is completely drained by the chest tube. The dependent component of the right pleural effusion remains.      2. Right middle lobe atelectasis.      DX-CHEST-PORTABLE (1 VIEW)   Final Result      1.  No significant change from prior.      DX-CHEST-PORTABLE (1 VIEW)   Final Result         1.  Bilateral lower lobe infiltrates, greater on the right, stable since prior study.   2.  Trace bilateral pleural effusions      DX-CHEST-LIMITED (1 VIEW)   Final Result      1.  Wedge-shaped opacity in right lung base consistent with atelectasis or pneumonitis.      2.  Blunting right costophrenic angle consistent with pleural effusion and/or pleural inflammation.      3.  Locking loop catheter overlying the right lung base with no evidence of pneumothorax.      IR-CHEST TUBE-EMPYEMA RIGHT   Final Result         1. Ultrasound and fluoroscopy guided  Right  12 Italian Pigtail Chest Tube Placement For Possible Empyema Versus Loculated Pleural Effusion.         CT-CTA CHEST PULMONARY ARTERY W/ RECONS   Final Result         1. No CT evidence of pulmonary embolism.      2. Moderate loculated right pleural effusion.      3. Patchy airspace opacity in the right middle lobe and right lower lobe, likely pneumonia.      DX-CHEST-PORTABLE (1 VIEW)   Final Result         Worsening airspace opacity in the right lower lung.   Loculated right pleural effusion, worse than prior.      DX-CHEST-LIMITED (1 VIEW)    (Results Pending)       ASSESSMENT AND PLAN:   * Parapneumonic effusion- (present on admission)  Assessment & Plan  4/17 Right chest tube placement for loculated effusion/empyema.  4/18 through 4/20 Intrapleual fibrinolytics with pulmonology.   4/20 CT chest. - min effusion - CT to water seal when ambulating.   4/21 CT to water seal.    ABX per  medicine.   [facility]on.            ____________________________________     BRITNEY Douglass    DD: 4/21/2023  12:45 PM

## 2023-04-21 NOTE — DISCHARGE SUMMARY
Discharge Summary    CHIEF COMPLAINT ON ADMISSION  Chief Complaint   Patient presents with    Shortness of Breath     Pt was seen two days ago and she's been feeling worse since. She had RSV in January and was diagnosed with pneumonia during her last visit. She has been taking her abx consistently. Denies respiratory history or chest pain.        Reason for Admission  Difficulty Breathing     Admission Date  4/15/2023    CODE STATUS  Full Code    HPI & HOSPITAL COURSE  41 yo woman with meth use, tobacco use who presented with SOB and cough despite recent oral treatment for pneumonia.  CT showed loculated right pleural effusion with pneumonia.  She was started on IV zosyn. Surgery was consulted and had IR placed right chest tube 4/17.  Pulmonology was consulted and provided 6 doses of lytic therapy via chest tube. Pleural fluid culture had no growth. Follow up CT showed improvement of loculated fluid with some residual pleural effusion. Her symptoms had resolved, she had no fever or leukocytosis. Chest tube was removed and follow CXR was negative for PTX, did show persistent pleural effusion. She was stable on room air. At this point, her pleura fluid had no growth, she had 7 days of IV antibiotic. I will prescribe her 2 weeks of augmentin and plan for repeat CT in 2 weeks and she will follow up with [facility] surgery clinic, as I discussed with the surgery team.  While hospitalized, she had uncontrolled diabetes, with A1C 10.1%, that improved with insulin which the patient was agreeable to continuing on discharge. She received insulin teaching ad supplies were ordered. She was also hypertensive and started on losartan. She understands to follow up with her PCP for her DM and HTN management.    Therefore, she is discharged in good and stable condition to home with close outpatient follow-up.    The patient met 2-midnight criteria for an inpatient stay at the time of discharge.    Discharge Date  4/21/2023    FOLLOW UP  ITEMS POST DISCHARGE  Pleural effusion - follow up with surgery, referral to pulmonology also placed    DM - started on long acting insulin, HTN - losartan, f/u with PCP for outpatient management    DISCHARGE DIAGNOSES  Principal Problem:    Parapneumonic effusion POA: Yes  Active Problems:    Type 2 diabetes mellitus with hyperglycemia, without long-term current use of insulin (HCC) POA: Yes    Hyponatremia POA: Yes    Tobacco abuse POA: Yes    Acute respiratory failure with hypoxia (HCC) POA: Unknown    Cirrhosis (HCC) POA: Unknown    Pneumonia POA: Unknown    Candiduria POA: Unknown    Primary hypertension POA: Unknown  Resolved Problems:    * No resolved hospital problems. *      FOLLOW UP  Tam Coburn M.D.  [address] [facility]  [address]  [facility] [id number] [phone]    Follow up in 2 week(s)      [facility]  [address]  [facility] [address]  [phone]  Follow up        MEDICATIONS ON DISCHARGE     Medication List        START taking these medications        Instructions   Alcohol Prep 70 % Pads   Doctor's comments: Per formulary preference. ICD-10 code: E11.65 Uncontrolled type 2 Diabetes Mellitus  Wipe site with prep pad prior to injection.     amoxicillin-clavulanate 875-125 MG Tabs  Commonly known as: AUGMENTIN   Take 1 Tablet by mouth 2 times a day for 14 days.  Dose: 1 Tablet     BD Pen Needle Moni U/F  Generic drug: Insulin Pen Needle 32 G x 4 mm   Doctor's comments: Per patient/formulary preference. ICD-10 code: E11.65 Uncontrolled type 2 Diabetes Mellitus  Use one pen needle in pen device to inject insulin twice daily.     insulin glargine 100 UNIT/ML Sopn injection  Generic drug: insulin glargine   Inject 15 Units under the skin 2 times a day.  Dose: 15 Units     losartan 25 MG Tabs  Start taking on: April 22, 2023  Commonly known as: COZAAR   Take 1 Tablet by mouth every day.  Dose: 25 mg     OneTouch Delica Plus [id number] Misc   Doctor's comments: Or per formulary  preference. ICD-10 code: E11.65 Uncontrolled type 2 Diabetes Mellitus  Use one One Touch Verio Flex lancet to test blood sugar twice daily before meals.     OneTouch Verio Flex System w/Device Kit   Doctor's comments: Or per formulary preference. ICD-10 code: E11.65 Uncontrolled type 2 Diabetes Mellitus  Use to test daily as directed     OneTouch Verio strip  Generic drug: glucose blood   Doctor's comments: Or per formulary preference. ICD-10 code: E11.65 Uncontrolled type 2 Diabetes Mellitus  Use one One Touch Verio Flex strip to test blood sugar twice a day            CONTINUE taking these medications        Instructions   metFORMIN 500 MG Tabs  Commonly known as: GLUCOPHAGE   Take 1 Tablet by mouth 2 times a day with meals.  Dose: 500 mg            STOP taking these medications      amoxicillin 500 MG Caps  Commonly known as: AMOXIL     azithromycin 250 MG Tabs  Commonly known as: ZITHROMAX              Allergies  Allergies   Allergen Reactions    Hydrocodone Vomiting and Nausea       DIET  Orders Placed This Encounter   Procedures    Diet Order Diet: Consistent CHO (Diabetic)     Standing Status:   Standing     Number of Occurrences:   1     Order Specific Question:   Diet:     Answer:   Consistent CHO (Diabetic) [4]       ACTIVITY  As tolerated.  Weight bearing as tolerated    CONSULTATIONS  Surgery, pulmonology    PROCEDURES     Procedure(s): Right chest tube placement for loculated effusion/empyema.    DX-CHEST-LIMITED (1 VIEW)   Final Result      1.  No pneumothorax following right chest tube removal.   2.  Persistent small to moderate right pleural effusion and associated atelectasis.      DX-CHEST-LIMITED (1 VIEW)   Final Result      1.  Stable chest.      DX-CHEST-LIMITED (1 VIEW)   Final Result         1.  Bilateral lower lobe infiltrates, greater on the right, stable since prior study.   2.  Trace bilateral pleural effusions, increased since prior study      CT-CHEST (THORAX) W/O   Final Result          1. Loculated lateral component in the right pleural effusion is completely drained by the chest tube. The dependent component of the right pleural effusion remains.      2. Right middle lobe atelectasis.      DX-CHEST-PORTABLE (1 VIEW)   Final Result      1.  No significant change from prior.      DX-CHEST-PORTABLE (1 VIEW)   Final Result         1.  Bilateral lower lobe infiltrates, greater on the right, stable since prior study.   2.  Trace bilateral pleural effusions      DX-CHEST-LIMITED (1 VIEW)   Final Result      1.  Wedge-shaped opacity in right lung base consistent with atelectasis or pneumonitis.      2.  Blunting right costophrenic angle consistent with pleural effusion and/or pleural inflammation.      3.  Locking loop catheter overlying the right lung base with no evidence of pneumothorax.      IR-CHEST TUBE-EMPYEMA RIGHT   Final Result         1. Ultrasound and fluoroscopy guided  Right  12 Indonesian Pigtail Chest Tube Placement For Possible Empyema Versus Loculated Pleural Effusion.         CT-CTA CHEST PULMONARY ARTERY W/ RECONS   Final Result         1. No CT evidence of pulmonary embolism.      2. Moderate loculated right pleural effusion.      3. Patchy airspace opacity in the right middle lobe and right lower lobe, likely pneumonia.      DX-CHEST-PORTABLE (1 VIEW)   Final Result         Worsening airspace opacity in the right lower lung.   Loculated right pleural effusion, worse than prior.      CT-CHEST (THORAX) WITH    (Results Pending)         LABORATORY  Lab Results   Component Value Date    SODIUM 134 (L) 04/21/2023    POTASSIUM 4.4 04/21/2023    CHLORIDE 102 04/21/2023    CO2 22 04/21/2023    GLUCOSE 213 (H) 04/21/2023    BUN 8 04/21/2023    CREATININE 0.60 04/21/2023        Lab Results   Component Value Date    WBC 6.5 04/21/2023    HEMOGLOBIN 9.5 (L) 04/21/2023    HEMATOCRIT 31.0 (L) 04/21/2023    PLATELETCT 377 04/21/2023        Total time of the discharge process exceeds 34 minutes.

## 2023-04-21 NOTE — CARE PLAN
The patient is Stable - Low risk of patient condition declining or worsening    Shift Goals  Clinical Goals: Pain control, monitor chest tube output  Patient Goals: Pain control, sleep    Progress made toward(s) clinical / shift goals:  Pain managed with prescribed medications and rest. Chest tube output with ) output this shift.    Problem: Knowledge Deficit - Standard  Goal: Patient and family/care givers will demonstrate understanding of plan of care, disease process/condition, diagnostic tests and medications  Outcome: Progressing     Problem: Pain - Standard  Goal: Alleviation of pain or a reduction in pain to the patient’s comfort goal  Outcome: Progressing       Patient is not progressing towards the following goals:

## 2023-04-21 NOTE — PROGRESS NOTES
Chest tube pulled without incident  Discussed with Dr. Miller regarding PO antibiotics   Repeat chest xray at 1800  If no pneumo present, Ok to discharge from Surgical perspective

## 2023-04-21 NOTE — CONSULTS
Diabetes education: Pt is newly dx with diabetes, but has a hx of GDM on insulin. Pt was admitted with blood sugars of  356, and Hga1c of 10.1%. Pt is currently on Glargine 15 units BID with Lispro per sliding scale coverage ac and hs. Blood sugars are 159 ( 2 units), 176 ( 2 units), and 137.  Pt was taught to use insulin pens and practiced with saline pens and practice device. Pt has used a meter and done finger sticks when pregnant.  Reviewed what effects blood sugars, goals for blood sugars, need for protein and controlled carbohydrates with every meal.  Plan: Handout given on insulin pens. Pt will need Basaglar insulin pens, Admelog vials and syringes with sliding scale written out and for ac only, pen needles, One touch verio flex meter, strips and lancets at discharge. Please use diabetes supplies, F2 for correct meter, and dx code. Please send to [facility] at discharge. CDE to follow for questions on Monday.

## 2023-04-21 NOTE — PROGRESS NOTES
"Housekeeping found cigarette ashes on patient's bathroom floor. Patient denied and verbalized \"Im not smoking here.\" Re-educated patient on hospital's policy. Informed charge nurse.    "

## 2023-04-21 NOTE — CARE PLAN
The patient is Stable - Low risk of patient condition declining or worsening    Shift Goals  Clinical Goals: pain control, ambulation, discharge  Patient Goals: Pain control, sleep    Progress made toward(s) clinical / shift goals:   Encouraged  patient to ambulate. Educated pain on different modalities for pain control. Educated patient on plan for discharge.      Patient is not progressing towards the following goals: N/A

## 2023-04-21 NOTE — PROGRESS NOTES
Hospital Medicine Daily Progress Note    Date of Service  4/21/2023    Chief Complaint  Jc Ramos is a 42 y.o. female admitted 4/15/2023 with SOB    Hospital Course  43 yo woman with meth use, tobacco use who presented with SOB and cough despite recent oral treatment for pneumonia.  CT showed loculated right pleural effusion with pneumonia.  Surgery was consulted and had IR placed right chest tube 4/17.  Pulmonology was consulted.    Interval Problem Update  Hypertensive, continue to trend on losartan  Chest tube with 0cc  Blood and pleural cultures continue to have no growth  Day 7 of antibiotics  CT chest showed resolution of loculated pleural effusion, some pleural fluid remains  Chest tube to waterseal  Patient denies shortness of breath.  She is anxious to discharge    I have discussed this patient's plan of care and discharge plan at IDT rounds today with Case Management, Nursing, Nursing leadership, and other members of the IDT team.    Consultants/Specialty  general surgery and pulmonary    Code Status  Full Code    Disposition  Patient is not medically cleared for discharge.   Anticipate discharge to to home with close outpatient follow-up.  I have placed the appropriate orders for post-discharge needs.    Review of Systems  Review of Systems   Constitutional:  Negative for malaise/fatigue.   Respiratory:  Negative for cough and shortness of breath.    Cardiovascular:  Negative for chest pain.   Gastrointestinal:  Negative for abdominal pain and nausea.   Musculoskeletal:  Negative for myalgias.   Neurological:  Negative for weakness.      Physical Exam  Temp:  [36.4 °C (97.5 °F)-37.2 °C (99 °F)] 37 °C (98.6 °F)  Pulse:  [] 96  Resp:  [16-20] 17  BP: (133-168)/(79-95) 144/81  SpO2:  [92 %-95 %] 95 %    Physical Exam  Vitals and nursing note reviewed.   Constitutional:       Appearance: She is not diaphoretic.   HENT:      Head: Normocephalic.      Mouth/Throat:      Mouth: Mucous membranes are  moist.   Eyes:      General:         Right eye: No discharge.         Left eye: No discharge.   Cardiovascular:      Rate and Rhythm: Normal rate and regular rhythm.   Pulmonary:      Effort: No respiratory distress.      Breath sounds: Rhonchi and rales present. No wheezing.      Comments: Right-sided chest tube   Abdominal:      Palpations: Abdomen is soft.      Tenderness: There is no abdominal tenderness.   Musculoskeletal:         General: No swelling.      Cervical back: Neck supple.   Skin:     General: Skin is warm and dry.   Neurological:      Mental Status: She is alert and oriented to person, place, and time.       Fluids    Intake/Output Summary (Last 24 hours) at 4/21/2023 1444  Last data filed at 4/21/2023 1133  Gross per 24 hour   Intake 651.94 ml   Output 0 ml   Net 651.94 ml       Laboratory  Recent Labs     04/19/23  1144 04/20/23  0624 04/21/23  0404   WBC 7.1 6.5 6.5   RBC 3.74* 3.72* 3.78*   HEMOGLOBIN 9.6* 9.3* 9.5*   HEMATOCRIT 30.6* 30.4* 31.0*   MCV 81.8 81.7 82.0   MCH 25.7* 25.0* 25.1*   MCHC 31.4* 30.6* 30.6*   RDW 43.3 42.9 43.5   PLATELETCT 314 361 377   MPV 9.8 9.6 9.9     Recent Labs     04/19/23  1144 04/20/23  0624 04/21/23  0404   SODIUM 133* 135 134*   POTASSIUM 4.5 4.2 4.4   CHLORIDE 102 101 102   CO2 25 24 22   GLUCOSE 246* 158* 213*   BUN 8 7* 8   CREATININE 0.59 0.52 0.60   CALCIUM 8.7 9.0 8.7                     Imaging  DX-CHEST-LIMITED (1 VIEW)   Final Result         1.  Bilateral lower lobe infiltrates, greater on the right, stable since prior study.   2.  Trace bilateral pleural effusions, increased since prior study      CT-CHEST (THORAX) W/O   Final Result         1. Loculated lateral component in the right pleural effusion is completely drained by the chest tube. The dependent component of the right pleural effusion remains.      2. Right middle lobe atelectasis.      DX-CHEST-PORTABLE (1 VIEW)   Final Result      1.  No significant change from prior.       DX-CHEST-PORTABLE (1 VIEW)   Final Result         1.  Bilateral lower lobe infiltrates, greater on the right, stable since prior study.   2.  Trace bilateral pleural effusions      DX-CHEST-LIMITED (1 VIEW)   Final Result      1.  Wedge-shaped opacity in right lung base consistent with atelectasis or pneumonitis.      2.  Blunting right costophrenic angle consistent with pleural effusion and/or pleural inflammation.      3.  Locking loop catheter overlying the right lung base with no evidence of pneumothorax.      IR-CHEST TUBE-EMPYEMA RIGHT   Final Result         1. Ultrasound and fluoroscopy guided  Right  12 Slovak Pigtail Chest Tube Placement For Possible Empyema Versus Loculated Pleural Effusion.         CT-CTA CHEST PULMONARY ARTERY W/ RECONS   Final Result         1. No CT evidence of pulmonary embolism.      2. Moderate loculated right pleural effusion.      3. Patchy airspace opacity in the right middle lobe and right lower lobe, likely pneumonia.      DX-CHEST-PORTABLE (1 VIEW)   Final Result         Worsening airspace opacity in the right lower lung.   Loculated right pleural effusion, worse than prior.      DX-CHEST-LIMITED (1 VIEW)    (Results Pending)        Assessment/Plan  * Parapneumonic effusion- (present on admission)  Assessment & Plan  Found on CT scan  Patient does have a underlying history of cirrhosis  Pleural fluid culture has no growth  Continue on Zosyn, day 7  Surgery consulted and [facility]  Pulmonology completed 6 doses of lytic therapy.  CT chest shows improvement of loculated fluid  Check chest x-ray tomorrow    Primary hypertension  Assessment & Plan  given her diabetes I will start her on losartan  Her hypertension is slowly improving    Candiduria  Assessment & Plan  Given dose of fluconazole    Pneumonia  Assessment & Plan  Failed outpatient pneumonia treatment  Continue on Zosyn  Follow pleural fluid culture      Cirrhosis (HCC)  Assessment & Plan  Seen on  imaging  JENNY, anti-smooth muscle antibody were normal  negative for viral hepatitis  Will need to follow-up with gastroenterology      Acute respiratory failure with hypoxia (HCC)  Assessment & Plan  Pleural effusion and pneumonia  On 1-3 L O2  Chest tube to waterseal    Tobacco abuse- (present on admission)  Assessment & Plan  Tobacco cessation education provided    Hyponatremia- (present on admission)  Assessment & Plan  Resolved    Type 2 diabetes mellitus with hyperglycemia, without long-term current use of insulin (HCC)- (present on admission)  Assessment & Plan  Takes metformin outpatient  A1c is 10.1%  Glucose is better controlled on Lantus 15 units twice daily  ISS  Diabetes education completed  Discussed with her needing to add insulin on discharge, she is okay with continuing insulin             VTE prophylaxis: SCDs/TEDs    I have performed a physical exam and reviewed and updated ROS and Plan today (4/21/2023). In review of yesterday's note (4/20/2023), there are no changes except as documented above.

## 2023-04-21 NOTE — PROGRESS NOTES
Bedside report received. Assessment complete.  A&O x 4. Patient calls appropriately. Patient upself. Patient denies pain at this time.  Declines pharmacologic interventions at this time. Comfort measures provided. Medicated per MAR for complaints of pain. Denies N&V. Patient tolerating diabetic diet.       R chest tube in place, DIP, (-) air leak on -20 low continuous suction.       + void, + flatus, Last BM was 04/20.  Patient denies SOB.  SCD's refused.      Patient is pleasant and cooperative with plan of care.Review plan with of care with patient. Call light and personal belongings within reach. Hourly rounding in place. All needs met at this time.

## 2023-04-22 NOTE — PROGRESS NOTES
"Prior to this incident,patient verbalized that she wants to discharge after CT is removed. Explained to patient that Xray needs to be done prior to discharge. Patient verbalized understanding. Frequent checks were done.      Patient was nowhere to be found in the unit and building during this time. Did not inform any of the staff and this RN. Notified Dr. Miller, Charge Nurse  and JESSICA Barcenas of possible AMA or elopement.    Patient came back to unit  at 17:55 to her room. She said \" I was just outside where the picnic tables where because I needed to walk\" .      Informed Dr. Miller, JESSICA Reid and Charge Nurse that patient was back. Awaiting results for Xray to determine for possible discharge.    "

## 2023-04-22 NOTE — DISCHARGE INSTRUCTIONS
Discharge Instructions per Syd Miller M.D.    DIAGNOSIS:   Lung infection with pleural effusion - Please continue antibiotic for 2 weeks and then get a CT chest scan in 2 weeks, I have ordered for [facility] radiology. Do not take metformin the day before and the day of the CT scan. Follow up with [facility] after CT scan is done. I have also made a referral to have you see pulmonology, you will be contact for an appointment.  Diabetes - Continue metformin with the insulin as prescribed. You are also prescribed losartan for high blood pressure. Please monitor your glucose levels twice a day and see your primary care doctor in 2 weeks.

## 2023-08-22 ENCOUNTER — HOSPITAL ENCOUNTER (INPATIENT)
Facility: MEDICAL CENTER | Age: 43
LOS: 2 days | DRG: 603 | End: 2023-08-24
Attending: STUDENT IN AN ORGANIZED HEALTH CARE EDUCATION/TRAINING PROGRAM | Admitting: STUDENT IN AN ORGANIZED HEALTH CARE EDUCATION/TRAINING PROGRAM
Payer: MEDICAID

## 2023-08-22 ENCOUNTER — APPOINTMENT (OUTPATIENT)
Dept: RADIOLOGY | Facility: MEDICAL CENTER | Age: 43
DRG: 603 | End: 2023-08-22
Attending: STUDENT IN AN ORGANIZED HEALTH CARE EDUCATION/TRAINING PROGRAM
Payer: MEDICAID

## 2023-08-22 DIAGNOSIS — L03.116 CELLULITIS OF LEFT LOWER EXTREMITY: ICD-10-CM

## 2023-08-22 PROBLEM — E66.01 MORBID OBESITY (HCC): Status: ACTIVE | Noted: 2023-08-22

## 2023-08-22 LAB
ALBUMIN SERPL BCP-MCNC: 3.1 G/DL (ref 3.2–4.9)
ALBUMIN/GLOB SERPL: 0.8 G/DL
ALP SERPL-CCNC: 91 U/L (ref 30–99)
ALT SERPL-CCNC: 20 U/L (ref 2–50)
ANION GAP SERPL CALC-SCNC: 12 MMOL/L (ref 7–16)
APPEARANCE UR: CLEAR
AST SERPL-CCNC: 16 U/L (ref 12–45)
BACTERIA #/AREA URNS HPF: NEGATIVE /HPF
BASOPHILS # BLD AUTO: 0.1 % (ref 0–1.8)
BASOPHILS # BLD: 0.01 K/UL (ref 0–0.12)
BILIRUB SERPL-MCNC: 0.5 MG/DL (ref 0.1–1.5)
BILIRUB UR QL STRIP.AUTO: NEGATIVE
BUN SERPL-MCNC: 9 MG/DL (ref 8–22)
CALCIUM ALBUM COR SERPL-MCNC: 10 MG/DL (ref 8.5–10.5)
CALCIUM SERPL-MCNC: 9.3 MG/DL (ref 8.5–10.5)
CHLORIDE SERPL-SCNC: 93 MMOL/L (ref 96–112)
CO2 SERPL-SCNC: 21 MMOL/L (ref 20–33)
COLOR UR: ABNORMAL
CREAT SERPL-MCNC: 0.68 MG/DL (ref 0.5–1.4)
CREAT UR-MCNC: 94.54 MG/DL
EOSINOPHIL # BLD AUTO: 0.01 K/UL (ref 0–0.51)
EOSINOPHIL NFR BLD: 0.1 % (ref 0–6.9)
EPI CELLS #/AREA URNS HPF: ABNORMAL /HPF
ERYTHROCYTE [DISTWIDTH] IN BLOOD BY AUTOMATED COUNT: 43.3 FL (ref 35.9–50)
EST. AVERAGE GLUCOSE BLD GHB EST-MCNC: 180 MG/DL
GFR SERPLBLD CREATININE-BSD FMLA CKD-EPI: 111 ML/MIN/1.73 M 2
GLOBULIN SER CALC-MCNC: 3.7 G/DL (ref 1.9–3.5)
GLUCOSE BLD STRIP.AUTO-MCNC: 148 MG/DL (ref 65–99)
GLUCOSE BLD STRIP.AUTO-MCNC: 194 MG/DL (ref 65–99)
GLUCOSE BLD STRIP.AUTO-MCNC: 201 MG/DL (ref 65–99)
GLUCOSE BLD STRIP.AUTO-MCNC: 203 MG/DL (ref 65–99)
GLUCOSE SERPL-MCNC: 212 MG/DL (ref 65–99)
GLUCOSE UR STRIP.AUTO-MCNC: 100 MG/DL
HBA1C MFR BLD: 7.9 % (ref 4–5.6)
HCT VFR BLD AUTO: 37.7 % (ref 37–47)
HGB BLD-MCNC: 12.4 G/DL (ref 12–16)
HYALINE CASTS #/AREA URNS LPF: ABNORMAL /LPF
IMM GRANULOCYTES # BLD AUTO: 0.03 K/UL (ref 0–0.11)
IMM GRANULOCYTES NFR BLD AUTO: 0.4 % (ref 0–0.9)
KETONES UR STRIP.AUTO-MCNC: 15 MG/DL
LACTATE SERPL-SCNC: 1.5 MMOL/L (ref 0.5–2)
LACTATE SERPL-SCNC: 1.9 MMOL/L (ref 0.5–2)
LEUKOCYTE ESTERASE UR QL STRIP.AUTO: NEGATIVE
LYMPHOCYTES # BLD AUTO: 0.34 K/UL (ref 1–4.8)
LYMPHOCYTES NFR BLD: 4.5 % (ref 22–41)
MCH RBC QN AUTO: 27.2 PG (ref 27–33)
MCHC RBC AUTO-ENTMCNC: 32.9 G/DL (ref 32.2–35.5)
MCV RBC AUTO: 82.7 FL (ref 81.4–97.8)
MICRO URNS: ABNORMAL
MONOCYTES # BLD AUTO: 0.31 K/UL (ref 0–0.85)
MONOCYTES NFR BLD AUTO: 4.1 % (ref 0–13.4)
MUCOUS THREADS #/AREA URNS HPF: ABNORMAL /HPF
NEUTROPHILS # BLD AUTO: 6.93 K/UL (ref 1.82–7.42)
NEUTROPHILS NFR BLD: 90.8 % (ref 44–72)
NITRITE UR QL STRIP.AUTO: NEGATIVE
NRBC # BLD AUTO: 0 K/UL
NRBC BLD-RTO: 0 /100 WBC (ref 0–0.2)
PH UR STRIP.AUTO: 5.5 [PH] (ref 5–8)
PLATELET # BLD AUTO: 148 K/UL (ref 164–446)
PMV BLD AUTO: 12.4 FL (ref 9–12.9)
POTASSIUM SERPL-SCNC: 3.8 MMOL/L (ref 3.6–5.5)
PROT SERPL-MCNC: 6.8 G/DL (ref 6–8.2)
PROT UR QL STRIP: 100 MG/DL
RBC # BLD AUTO: 4.56 M/UL (ref 4.2–5.4)
RBC # URNS HPF: ABNORMAL /HPF
RBC UR QL AUTO: ABNORMAL
SODIUM SERPL-SCNC: 126 MMOL/L (ref 135–145)
SODIUM SERPL-SCNC: 127 MMOL/L (ref 135–145)
SODIUM UR-SCNC: 98 MMOL/L
SP GR UR STRIP.AUTO: 1.03
UROBILINOGEN UR STRIP.AUTO-MCNC: 2 MG/DL
WBC # BLD AUTO: 7.6 K/UL (ref 4.8–10.8)
WBC #/AREA URNS HPF: ABNORMAL /HPF

## 2023-08-22 PROCEDURE — 96365 THER/PROPH/DIAG IV INF INIT: CPT

## 2023-08-22 PROCEDURE — 36415 COLL VENOUS BLD VENIPUNCTURE: CPT

## 2023-08-22 PROCEDURE — 700111 HCHG RX REV CODE 636 W/ 250 OVERRIDE (IP): Mod: JZ,UD | Performed by: EMERGENCY MEDICINE

## 2023-08-22 PROCEDURE — 700102 HCHG RX REV CODE 250 W/ 637 OVERRIDE(OP): Performed by: STUDENT IN AN ORGANIZED HEALTH CARE EDUCATION/TRAINING PROGRAM

## 2023-08-22 PROCEDURE — 700111 HCHG RX REV CODE 636 W/ 250 OVERRIDE (IP): Mod: JZ | Performed by: STUDENT IN AN ORGANIZED HEALTH CARE EDUCATION/TRAINING PROGRAM

## 2023-08-22 PROCEDURE — 81001 URINALYSIS AUTO W/SCOPE: CPT

## 2023-08-22 PROCEDURE — 99406 BEHAV CHNG SMOKING 3-10 MIN: CPT | Performed by: STUDENT IN AN ORGANIZED HEALTH CARE EDUCATION/TRAINING PROGRAM

## 2023-08-22 PROCEDURE — 700105 HCHG RX REV CODE 258: Performed by: STUDENT IN AN ORGANIZED HEALTH CARE EDUCATION/TRAINING PROGRAM

## 2023-08-22 PROCEDURE — 83036 HEMOGLOBIN GLYCOSYLATED A1C: CPT

## 2023-08-22 PROCEDURE — A9270 NON-COVERED ITEM OR SERVICE: HCPCS | Performed by: STUDENT IN AN ORGANIZED HEALTH CARE EDUCATION/TRAINING PROGRAM

## 2023-08-22 PROCEDURE — 700105 HCHG RX REV CODE 258: Mod: UD | Performed by: EMERGENCY MEDICINE

## 2023-08-22 PROCEDURE — 87641 MR-STAPH DNA AMP PROBE: CPT

## 2023-08-22 PROCEDURE — 80053 COMPREHEN METABOLIC PANEL: CPT

## 2023-08-22 PROCEDURE — 84295 ASSAY OF SERUM SODIUM: CPT

## 2023-08-22 PROCEDURE — 87040 BLOOD CULTURE FOR BACTERIA: CPT

## 2023-08-22 PROCEDURE — 83605 ASSAY OF LACTIC ACID: CPT

## 2023-08-22 PROCEDURE — 99285 EMERGENCY DEPT VISIT HI MDM: CPT

## 2023-08-22 PROCEDURE — 85025 COMPLETE CBC W/AUTO DIFF WBC: CPT

## 2023-08-22 PROCEDURE — 82962 GLUCOSE BLOOD TEST: CPT

## 2023-08-22 PROCEDURE — 87086 URINE CULTURE/COLONY COUNT: CPT

## 2023-08-22 PROCEDURE — 770004 HCHG ROOM/CARE - ONCOLOGY PRIVATE *

## 2023-08-22 PROCEDURE — 99222 1ST HOSP IP/OBS MODERATE 55: CPT | Mod: 25 | Performed by: STUDENT IN AN ORGANIZED HEALTH CARE EDUCATION/TRAINING PROGRAM

## 2023-08-22 PROCEDURE — 84300 ASSAY OF URINE SODIUM: CPT

## 2023-08-22 PROCEDURE — 82570 ASSAY OF URINE CREATININE: CPT

## 2023-08-22 PROCEDURE — 71045 X-RAY EXAM CHEST 1 VIEW: CPT

## 2023-08-22 RX ORDER — DEXTROSE MONOHYDRATE 25 G/50ML
25 INJECTION, SOLUTION INTRAVENOUS
Status: DISCONTINUED | OUTPATIENT
Start: 2023-08-22 | End: 2023-08-24 | Stop reason: HOSPADM

## 2023-08-22 RX ORDER — SODIUM CHLORIDE, SODIUM LACTATE, POTASSIUM CHLORIDE, AND CALCIUM CHLORIDE .6; .31; .03; .02 G/100ML; G/100ML; G/100ML; G/100ML
30 INJECTION, SOLUTION INTRAVENOUS ONCE
Status: COMPLETED | OUTPATIENT
Start: 2023-08-22 | End: 2023-08-22

## 2023-08-22 RX ORDER — ENOXAPARIN SODIUM 100 MG/ML
40 INJECTION SUBCUTANEOUS DAILY
Status: DISCONTINUED | OUTPATIENT
Start: 2023-08-22 | End: 2023-08-24 | Stop reason: HOSPADM

## 2023-08-22 RX ORDER — SODIUM CHLORIDE 9 MG/ML
INJECTION, SOLUTION INTRAVENOUS ONCE
Status: COMPLETED | OUTPATIENT
Start: 2023-08-22 | End: 2023-08-22

## 2023-08-22 RX ORDER — SODIUM CHLORIDE, SODIUM LACTATE, POTASSIUM CHLORIDE, CALCIUM CHLORIDE 600; 310; 30; 20 MG/100ML; MG/100ML; MG/100ML; MG/100ML
INJECTION, SOLUTION INTRAVENOUS CONTINUOUS
Status: DISCONTINUED | OUTPATIENT
Start: 2023-08-22 | End: 2023-08-22

## 2023-08-22 RX ORDER — MORPHINE SULFATE 4 MG/ML
1 INJECTION INTRAVENOUS EVERY 4 HOURS PRN
Status: COMPLETED | OUTPATIENT
Start: 2023-08-22 | End: 2023-08-22

## 2023-08-22 RX ORDER — ACETAMINOPHEN 325 MG/1
650 TABLET ORAL EVERY 4 HOURS PRN
Status: DISCONTINUED | OUTPATIENT
Start: 2023-08-22 | End: 2023-08-24 | Stop reason: HOSPADM

## 2023-08-22 RX ORDER — IBUPROFEN 200 MG
400 TABLET ORAL EVERY 6 HOURS PRN
COMMUNITY

## 2023-08-22 RX ADMIN — INSULIN HUMAN 6 UNITS: 100 INJECTION, SOLUTION PARENTERAL at 18:41

## 2023-08-22 RX ADMIN — ACETAMINOPHEN 650 MG: 325 TABLET, FILM COATED ORAL at 08:05

## 2023-08-22 RX ADMIN — AMPICILLIN AND SULBACTAM 3 G: 1; 2 INJECTION, POWDER, FOR SOLUTION INTRAMUSCULAR; INTRAVENOUS at 19:02

## 2023-08-22 RX ADMIN — AMPICILLIN AND SULBACTAM 3 G: 1; 2 INJECTION, POWDER, FOR SOLUTION INTRAMUSCULAR; INTRAVENOUS at 03:16

## 2023-08-22 RX ADMIN — MORPHINE SULFATE 1 MG: 4 INJECTION, SOLUTION INTRAMUSCULAR; INTRAVENOUS at 22:49

## 2023-08-22 RX ADMIN — SODIUM CHLORIDE, POTASSIUM CHLORIDE, SODIUM LACTATE AND CALCIUM CHLORIDE 1917 ML: 600; 310; 30; 20 INJECTION, SOLUTION INTRAVENOUS at 02:25

## 2023-08-22 RX ADMIN — ENOXAPARIN SODIUM 40 MG: 100 INJECTION SUBCUTANEOUS at 18:41

## 2023-08-22 RX ADMIN — INSULIN HUMAN 6 UNITS: 100 INJECTION, SOLUTION PARENTERAL at 12:22

## 2023-08-22 RX ADMIN — AMPICILLIN AND SULBACTAM 3 G: 1; 2 INJECTION, POWDER, FOR SOLUTION INTRAMUSCULAR; INTRAVENOUS at 10:40

## 2023-08-22 RX ADMIN — SODIUM CHLORIDE: 9 INJECTION, SOLUTION INTRAVENOUS at 18:32

## 2023-08-22 RX ADMIN — MORPHINE SULFATE 1 MG: 4 INJECTION, SOLUTION INTRAMUSCULAR; INTRAVENOUS at 10:34

## 2023-08-22 RX ADMIN — MORPHINE SULFATE 1 MG: 4 INJECTION, SOLUTION INTRAMUSCULAR; INTRAVENOUS at 18:33

## 2023-08-22 ASSESSMENT — ENCOUNTER SYMPTOMS
NAUSEA: 0
DEPRESSION: 0
PALPITATIONS: 0
EYES NEGATIVE: 1
HEARTBURN: 0
DIZZINESS: 0
RESPIRATORY NEGATIVE: 1
HEMOPTYSIS: 0
BRUISES/BLEEDS EASILY: 0
MYALGIAS: 0
HEADACHES: 0
NECK PAIN: 0
FEVER: 0
COUGH: 0
BLURRED VISION: 0
CARDIOVASCULAR NEGATIVE: 1
GASTROINTESTINAL NEGATIVE: 1
NEUROLOGICAL NEGATIVE: 1
MUSCULOSKELETAL NEGATIVE: 1
CHILLS: 0
DOUBLE VISION: 0

## 2023-08-22 ASSESSMENT — PAIN DESCRIPTION - PAIN TYPE
TYPE: ACUTE PAIN
TYPE: ACUTE PAIN

## 2023-08-22 ASSESSMENT — FIBROSIS 4 INDEX: FIB4 SCORE: 0.29

## 2023-08-22 NOTE — ED NOTES
Med Rec complete per Pt at bedside.  Allergies reviewed.  Home Pharmacy:  [facility]
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
## 2023-08-22 NOTE — ED PROVIDER NOTES
"ED Provider Note    CHIEF COMPLAINT  Chief Complaint   Patient presents with    Fever    Wound Infection       EXTERNAL RECORDS REVIEWED  Inpatient Notes admission 4 months prior for pneumonia    HPI/ROS  LIMITATION TO HISTORY   Select: : None  OUTSIDE HISTORIAN(S):      Jc Ramos is a 42 y.o. female who presents to the emergency department with chief complaint of wound infection.  Patient reports an area of skin breakdown over the left lower extremity several days ago.  Over time she has had increasing pain redness swelling and warmth of the area now involving much of her lower leg.  She reports pain at an \"11 out of 10\".  She also reports that she has had chills nausea and vomiting.  She has minimal upper abdominal pain from the nausea and vomiting she has been having.  No chest pain no shortness of breath no headache no altered mental status no problems with urination or bowel movements no other acute symptom change or concern at this time.    PAST MEDICAL HISTORY   has a past medical history of Diabetes.    SURGICAL HISTORY   has a past surgical history that includes cholecystectomy; c-sec only,prev c-sec; and irrigation & debridement ortho (Left, 8/24/2021).    FAMILY HISTORY  Family History   Problem Relation Age of Onset    Diabetes Mother        SOCIAL HISTORY  Social History     Tobacco Use    Smoking status: Every Day     Packs/day: 1     Types: Cigarettes    Smokeless tobacco: Never    Tobacco comments:     1/2 ppd since age 16   Vaping Use    Vaping Use: Never used   Substance and Sexual Activity    Alcohol use: No    Drug use: No    Sexual activity: Not on file       CURRENT MEDICATIONS  Home Medications       Reviewed by Janeth Carbajal R.N. (Registered Nurse) on 08/22/23 at 0049  Med List Status: Not Addressed     Medication Last Dose Status   Alcohol Swabs  Active   Blood Glucose Monitoring Suppl (ONETOUCH VERIO FLEX SYSTEM) w/Device Kit  Active   glucose blood (ONETOUCH VERIO) strip  Active " "  insulin glargine 100 UNIT/ML Solution Pen-injector injection  Active   Insulin Pen Needle 32 G x 4 mm  Active   Lancets (ONETOUCH DELICA PLUS [id number]) Misc  Active   losartan (COZAAR) 25 MG Tab  Active   metFORMIN (GLUCOPHAGE) 500 MG Tab  Active                    ALLERGIES  Allergies   Allergen Reactions    Hydrocodone Vomiting and Nausea       PHYSICAL EXAM  VITAL SIGNS: BP (!) 146/85   Pulse (!) 116   Temp (!) 38.1 °C (100.5 °F) (Oral)   Resp 18   Ht 1.727 m (5' 8\")   Wt 117 kg (257 lb 8 oz)   SpO2 98%   BMI 39.15 kg/m²    Pulse ox interpretation: I interpret this pulse ox as normal.  Constitutional: Alert and oriented x 3, moderate Distress  HEENT: Atraumatic normocephalic, pupils are equal round reactive to light extraocular movements are intact. The nares is clear, external ears are normal, mouth shows moist mucous membranes normal dentition for age  Neck: Supple, no JVD no tracheal deviation  Cardiovascular: Regular rate and rhythm no murmur rub or gallop 2+ pulses peripherally x4  Thorax & Lungs: No respiratory distress, no wheezes rales or rhonchi, No chest tenderness.   GI: Soft nontender nondistended positive bowel sounds, no peritoneal signs  Skin:     Musculoskeletal: Moving all extremities with full range and 5 of 5 strength no acute  deformity, changes in the left lower extremity as above  Neurologic: Cranial nerves III through XII are grossly intact no sensory deficit no cerebellar dysfunction   Psychiatric: Appropriate affect for situation at this time          DIAGNOSTIC STUDIES / PROCEDURES  Results for orders placed or performed during the hospital encounter of 08/22/23   Lactic acid (lactate)   Result Value Ref Range    Lactic Acid 1.9 0.5 - 2.0 mmol/L   CBC With Differential   Result Value Ref Range    WBC 7.6 4.8 - 10.8 K/uL    RBC 4.56 4.20 - 5.40 M/uL    Hemoglobin 12.4 12.0 - 16.0 g/dL    Hematocrit 37.7 37.0 - 47.0 %    MCV 82.7 81.4 - 97.8 fL    MCH 27.2 27.0 - 33.0 pg    MCHC " 32.9 32.2 - 35.5 g/dL    RDW 43.3 35.9 - 50.0 fL    Platelet Count 148 (L) 164 - 446 K/uL    MPV 12.4 9.0 - 12.9 fL    Neutrophils-Polys 90.80 (H) 44.00 - 72.00 %    Lymphocytes 4.50 (L) 22.00 - 41.00 %    Monocytes 4.10 0.00 - 13.40 %    Eosinophils 0.10 0.00 - 6.90 %    Basophils 0.10 0.00 - 1.80 %    Immature Granulocytes 0.40 0.00 - 0.90 %    Nucleated RBC 0.00 0.00 - 0.20 /100 WBC    Neutrophils (Absolute) 6.93 1.82 - 7.42 K/uL    Lymphs (Absolute) 0.34 (L) 1.00 - 4.80 K/uL    Monos (Absolute) 0.31 0.00 - 0.85 K/uL    Eos (Absolute) 0.01 0.00 - 0.51 K/uL    Baso (Absolute) 0.01 0.00 - 0.12 K/uL    Immature Granulocytes (abs) 0.03 0.00 - 0.11 K/uL    NRBC (Absolute) 0.00 K/uL   Comp Metabolic Panel   Result Value Ref Range    Sodium 126 (L) 135 - 145 mmol/L    Potassium 3.8 3.6 - 5.5 mmol/L    Chloride 93 (L) 96 - 112 mmol/L    Co2 21 20 - 33 mmol/L    Anion Gap 12.0 7.0 - 16.0    Glucose 212 (H) 65 - 99 mg/dL    Bun 9 8 - 22 mg/dL    Creatinine 0.68 0.50 - 1.40 mg/dL    Calcium 9.3 8.5 - 10.5 mg/dL    Correct Calcium 10.0 8.5 - 10.5 mg/dL    AST(SGOT) 16 12 - 45 U/L    ALT(SGPT) 20 2 - 50 U/L    Alkaline Phosphatase 91 30 - 99 U/L    Total Bilirubin 0.5 0.1 - 1.5 mg/dL    Albumin 3.1 (L) 3.2 - 4.9 g/dL    Total Protein 6.8 6.0 - 8.2 g/dL    Globulin 3.7 (H) 1.9 - 3.5 g/dL    A-G Ratio 0.8 g/dL   Urinalysis    Specimen: Urine, Clean Catch   Result Value Ref Range    Micro Urine Req Microscopic    ESTIMATED GFR   Result Value Ref Range    GFR (CKD-EPI) 111 >60 mL/min/1.73 m 2         RADIOLOGY  DX-CHEST-PORTABLE (1 VIEW)   Final Result         1.  Linear density in the right midlung favoring scarring, corresponding with previously visualized area of infiltrate. No focal infiltrate identified.            COURSE & MEDICAL DECISION MAKING    ED Observation Status? No; Patient does not meet criteria for ED Observation.     ASSESSMENT, COURSE AND PLAN  Care Narrative: 42-year-old female presents with infected wound on  "her left lower extremity.  She has had nausea vomiting fevers she arrives febrile tachycardic with obvious source of infection therefore considered sepsis patient was given 30 cc/kg fluid bolus she was given 3 g of Unasyn.  Patient will be admitted to the hospital service for ongoing management she is also found to have profound hyponatremia at 126.  Discussed with hospitalist Dr. Tanner and admitted to the hospital for ongoing evaluation and treatment.        CRITICAL CARE  The very real possibilty of a deterioration of this patient's condition required the highest level of my preparedness for sudden, emergent intervention.  I provided critical care services, which included medication orders, frequent reevaluations of the patient's condition and response to treatment, ordering and reviewing test results, and discussing the case with various consultants.  The critical care time associated with the care of the patient was 40 minutes. Review chart for interventions. This time is exclusive of any other billable procedures.     /82   Pulse 91   Temp 36.8 °C (98.3 °F) (Oral)   Resp 19   Ht 1.727 m (5' 8\")   Wt 117 kg (257 lb 8 oz)   SpO2 98%   BMI 39.15 kg/m²         FINAL DIAGNOSIS  1.  Sepsis  2.  Febrile illness  3.  Cellulitis  4.  Hyponatremia  5.  Hyperglycemia  6.  Critical care 40 minutes           "

## 2023-08-22 NOTE — PROGRESS NOTES
Hospital Medicine Daily Progress Note    Date of Service  8/22/2023    Chief Complaint  Anabell Rees is a 42 y.o. homeless female admitted 8/22/2023 with left leg swelling, redness, and pain. She suspects that she may have been bitten by a spider. She is medically non-compliant, and has a history of type 2 diabetes and tobacco dependence.    Hospital Course  Admitted to medical floor.  She was febrile (100.5), tachycardic (116), blood pressure was stable.  Respiratory rate was up to 21.  Unasyn was empirically started.  IV fluids started.  Sodium is 126, glucose was 212, WBCs was 7.6.    Interval Problem Update  Temperature is 99.1, heart rate is 94, blood pressure is 152/81, respiratory rate is 18. Sodium is 127.  MRSA nares is pending.  Chest x-ray from today showed no acute abnormalities.  Lower extremity ultrasound pending.  Blood cultures pending.    I have discussed this patient's plan of care and discharge plan at IDT rounds today with Case Management, Nursing, Nursing leadership, and other members of the IDT team.    Consultants/Specialty  None    Code Status  Full Code    Disposition  The patient is not medically cleared for discharge to home or a post-acute facility.  Anticipate discharge to: court or custody of law enforcement    I have placed the appropriate orders for post-discharge needs.    Review of Systems  Review of Systems   Constitutional:  Positive for malaise/fatigue.   HENT: Negative.     Eyes: Negative.    Respiratory: Negative.     Cardiovascular: Negative.    Gastrointestinal: Negative.    Genitourinary: Negative.    Musculoskeletal: Negative.    Skin: Negative.    Neurological: Negative.    Endo/Heme/Allergies: Negative.         Physical Exam  Temp:  [36.8 °C (98.3 °F)-38.1 °C (100.5 °F)] 37.3 °C (99.1 °F)  Pulse:  [] 94  Resp:  [18-21] 18  BP: (132-152)/(77-85) 152/81  SpO2:  [95 %-98 %] 98 %    Physical Exam  Constitutional:       Appearance: She is obese. She is  ill-appearing.   HENT:      Head: Normocephalic.      Mouth/Throat:      Mouth: Mucous membranes are moist.   Eyes:      Pupils: Pupils are equal, round, and reactive to light.   Cardiovascular:      Rate and Rhythm: Normal rate.   Pulmonary:      Effort: Pulmonary effort is normal.   Abdominal:      Palpations: Abdomen is soft.   Musculoskeletal:      Cervical back: Normal range of motion.      Left lower leg: Edema present.   Skin:     General: Skin is warm.      Findings: Erythema and lesion present.      Comments: Lesion on left lower extremity   Neurological:      Mental Status: She is alert.   Psychiatric:         Mood and Affect: Mood normal.         Fluids  No intake or output data in the 24 hours ending 08/22/23 0903    Laboratory  Recent Labs     08/22/23 0219   WBC 7.6   RBC 4.56   HEMOGLOBIN 12.4   HEMATOCRIT 37.7   MCV 82.7   MCH 27.2   MCHC 32.9   RDW 43.3   PLATELETCT 148*   MPV 12.4     Recent Labs     08/22/23 0219 08/22/23  0757   SODIUM 126* 127*   POTASSIUM 3.8  --    CHLORIDE 93*  --    CO2 21  --    GLUCOSE 212*  --    BUN 9  --    CREATININE 0.68  --    CALCIUM 9.3  --                    Imaging  DX-CHEST-PORTABLE (1 VIEW)   Final Result         1.  Linear density in the right midlung favoring scarring, corresponding with previously visualized area of infiltrate. No focal infiltrate identified.      US-EXTREMITY VENOUS LOWER UNILAT LEFT    (Results Pending)        Assessment/Plan  * Cellulitis- (present on admission)  Assessment & Plan  Suspected om. on IV Unasyn.  Cultures are pending.  Pain control as needed    Tobacco abuse- (present on admission)  Assessment & Plan  Patient declines nicotine replacement.  Continue to  on cessation.    Hyponatremia- (present on admission)  Assessment & Plan  History of a history of hyponatremia.  Continue to cautiously correct.    Type 2 diabetes mellitus with hyperglycemia, without long-term current use of insulin (HCC)- (present on  admission)  Assessment & Plan  History of medical noncompliance.  Hemoglobin A1c was 10.1 on 4/17/2023, repeat ordered.  Continue SSI with Accu-Cheks and hypoglycemia protocol.    Morbid obesity (HCC)  Assessment & Plan  Recommend outpatient follow-up for weight management and control.         VTE prophylaxis:    enoxaparin ppx

## 2023-08-22 NOTE — H&P
Hospital Medicine History & Physical Note    Date of Service  8/22/2023    Primary Care Physician  Pcp Pt States None    Consultants  none    Specialist Names: none    Code Status  Full Code    Chief Complaint  Chief Complaint   Patient presents with    Fever    Wound Infection       History of Presenting Illness  Jc Ramos is a 42 y.o. female past medical history of diabetes mellitus supposed to be on insulin, noncompliant, homeless, tobacco dependence who presented 8/22/2023 with left leg swelling, redness and pain.  Patient denies any trauma but thinks she may have been bit by a spider.  Reports the pain is better with elevation, relieved with rest.  Does report smoking half a pack of cigarettes daily.  No other relieving is irregular noted.  In the ER, patient did meet SIRS criteria started on IV Unasyn for cellulitis given 30 cc/kg fluids.  Admitted to medicine service.      I discussed the plan of care with patient and ALAN Chadwick .    Review of Systems  Review of Systems   Constitutional:  Negative for chills and fever.   HENT:  Negative for hearing loss and tinnitus.    Eyes:  Negative for blurred vision and double vision.   Respiratory:  Negative for cough and hemoptysis.    Cardiovascular:  Negative for chest pain and palpitations.   Gastrointestinal:  Negative for heartburn and nausea.   Genitourinary:  Negative for dysuria and urgency.   Musculoskeletal:  Negative for myalgias and neck pain.        Leg pain   Neurological:  Negative for dizziness and headaches.   Endo/Heme/Allergies:  Does not bruise/bleed easily.   Psychiatric/Behavioral:  Negative for depression and suicidal ideas.        Past Medical History   has a past medical history of Diabetes.    Surgical History   has a past surgical history that includes cholecystectomy; pr c-sec only,prev c-sec; and irrigation & debridement ortho (Left, 8/24/2021).     Family History  family history includes Diabetes in her mother.   Family  history reviewed with patient. There is no family history that is pertinent to the chief complaint.     Social History   reports that she has been smoking cigarettes. She has never used smokeless tobacco. She reports that she does not drink alcohol and does not use drugs.    Allergies  Allergies   Allergen Reactions    Hydrocodone Vomiting and Nausea       Medications  Prior to Admission Medications   Prescriptions Last Dose Informant Patient Reported? Taking?   ibuprofen (MOTRIN) 200 MG Tab 8/21/2023 at PM  Yes Yes   Sig: Take 400 mg by mouth every 6 hours as needed for Inflammation. Indications: Pain      Facility-Administered Medications: None       Physical Exam  Temp:  [36.8 °C (98.3 °F)-38.1 °C (100.5 °F)] 36.8 °C (98.3 °F)  Pulse:  [] 91  Resp:  [18-21] 19  BP: (132-146)/(77-85) 132/82  SpO2:  [95 %-98 %] 98 %  Blood Pressure: 132/82   Temperature: 36.8 °C (98.3 °F)   Pulse: 91   Respiration: 19   Pulse Oximetry: 98 %       Physical Exam  Vitals and nursing note reviewed.   Constitutional:       Appearance: Normal appearance.      Comments: Disheveled    HENT:      Head: Normocephalic and atraumatic.      Right Ear: Tympanic membrane normal.      Left Ear: Tympanic membrane normal.      Nose: Nose normal.      Mouth/Throat:      Mouth: Mucous membranes are moist.      Pharynx: Oropharynx is clear.   Eyes:      Extraocular Movements: Extraocular movements intact.      Pupils: Pupils are equal, round, and reactive to light.   Cardiovascular:      Rate and Rhythm: Normal rate and regular rhythm.      Pulses: Normal pulses.      Heart sounds: Normal heart sounds.   Pulmonary:      Effort: Pulmonary effort is normal.      Breath sounds: Normal breath sounds.   Abdominal:      General: Bowel sounds are normal. There is no distension.      Palpations: Abdomen is soft. There is no mass.   Musculoskeletal:      Cervical back: Neck supple.      Comments: Left leg swollen, erythematous up to level of knee.  "Demarcated    Skin:     General: Skin is warm.      Capillary Refill: Capillary refill takes less than 2 seconds.   Neurological:      General: No focal deficit present.      Mental Status: She is alert and oriented to person, place, and time. Mental status is at baseline.   Psychiatric:         Mood and Affect: Mood normal.         Behavior: Behavior normal.         Laboratory:  Recent Labs     08/22/23 0219   WBC 7.6   RBC 4.56   HEMOGLOBIN 12.4   HEMATOCRIT 37.7   MCV 82.7   MCH 27.2   MCHC 32.9   RDW 43.3   PLATELETCT 148*   MPV 12.4     Recent Labs     08/22/23 0219   SODIUM 126*   POTASSIUM 3.8   CHLORIDE 93*   CO2 21   GLUCOSE 212*   BUN 9   CREATININE 0.68   CALCIUM 9.3     Recent Labs     08/22/23 0219   ALTSGPT 20   ASTSGOT 16   ALKPHOSPHAT 91   TBILIRUBIN 0.5   GLUCOSE 212*         No results for input(s): \"NTPROBNP\" in the last 72 hours.      No results for input(s): \"TROPONINT\" in the last 72 hours.    Imaging:  DX-CHEST-PORTABLE (1 VIEW)   Final Result         1.  Linear density in the right midlung favoring scarring, corresponding with previously visualized area of infiltrate. No focal infiltrate identified.          X-Ray:  I have personally reviewed the images and compared with prior images.    Assessment/Plan:  Justification for Admission Status  I anticipate this patient will require at least two midnights for appropriate medical management, necessitating inpatient admission because SIRS, cellulitis requiring IV abx    Patient will need a Med/Surg bed on MEDICAL service .  The need is secondary to see above.    * Cellulitis- (present on admission)  Assessment & Plan  LLE  IV unasyn 3g every 6 hours  F/u cultures  MRSA PCR  R/o DVT LLE with sonogram LLE  Pain control     Morbid obesity (HCC)  Assessment & Plan  BMI 39.5 + co morbidity DM     Encouraged healthy lifestyle     Tobacco abuse- (present on admission)  Assessment & Plan  Tobacco cessation education provided for more than 3 minutes, " discussed options of nicotine patch, medical treatment with wellbutrin and chantix. Discussed the risks of smoking including increased risk of heart disease, stroke, cancer and COPD  Declines nicotine patches    Type 2 diabetes mellitus with hyperglycemia, without long-term current use of insulin (HCC)- (present on admission)  Assessment & Plan  SSI + accu check + hypoglycemia protocol       SIRS   Tachycardia +Tachypnea     Hyponatremia  Cautious IV Fluids   No more than 8-10 meQ in 24 hours  Monitor Serum Na     VTE prophylaxis: SCDs/TEDs and enoxaparin ppx

## 2023-08-22 NOTE — ED NOTES
Pt transferred out of ED at this time with transport via gurney. Pt is A&Ox4, with stable vital signs and no apparent distress upon transfer. All paperwork and personal belongings sent with pt to [facility].

## 2023-08-22 NOTE — ASSESSMENT & PLAN NOTE
History of medical noncompliance.  Hemoglobin A1c was 10.1 on 4/17/2023, repeat ordered.  Continue SSI with Accu-Cheks and hypoglycemia protocol.

## 2023-08-22 NOTE — ED NOTES
This RN agree with triage. Pt to green 38 via wheelchair due to pain in leg. Swelling, redness and pain to LLE. Small open wound with drainage to lower leg noted.   Pt Aox4, GCS15. IV inated attempted x2. Will need US guided. Chart up for ERP. Phleb to draw second set of culutres.

## 2023-08-22 NOTE — CARE PLAN
The patient is Watcher - Medium risk of patient condition declining or worsening    Shift Goals  Clinical Goals: continue antibiotics  Patient Goals: pain control    Progress made toward(s) clinical / shift goals:    A/OX4, Pt is able to understand plan of care at this time.   Problem: Knowledge Deficit - Standard  Goal: Patient and family/care givers will demonstrate understanding of plan of care, disease process/condition, diagnostic tests and medications  Outcome: Progressing  Note: Pt educated on antibiotics.     Problem: Pain - Standard  Goal: Alleviation of pain or a reduction in pain to the patient’s comfort goal  Outcome: Progressing  Note: PRN pain medication working effectively to promote comfort.        Patient is not progressing towards the following goals:

## 2023-08-22 NOTE — ED TRIAGE NOTES
"  Chief Complaint   Patient presents with    Fever    Wound Infection     Pt presents to triage for pain, redness and swelling of left leg x 3 days. Small wound present on medial lower leg, pt states source unknown. Left leg is red, swollen and hot to the touch up to the knee. Pt reports Hx of DMT2. Sepsis score of 3 in triage. Sepsis protocol initiated.     Pt is alert/oriented, following commands, and answering questions appropriately. Pt placed in lobby and educated on triage process. Pt encouraged to alert staff for any changes in condition.    .BP (!) 146/85   Pulse (!) 116   Temp (!) 38.3 °C (100.9 °F) (Temporal)   Resp 18   Ht 1.727 m (5' 8\")   Wt 117 kg (257 lb 8 oz)   SpO2 98%   BMI 39.15 kg/m²     "

## 2023-08-23 ENCOUNTER — APPOINTMENT (OUTPATIENT)
Dept: RADIOLOGY | Facility: MEDICAL CENTER | Age: 43
DRG: 603 | End: 2023-08-23
Attending: INTERNAL MEDICINE
Payer: MEDICAID

## 2023-08-23 ENCOUNTER — APPOINTMENT (OUTPATIENT)
Dept: RADIOLOGY | Facility: MEDICAL CENTER | Age: 43
DRG: 603 | End: 2023-08-23
Attending: STUDENT IN AN ORGANIZED HEALTH CARE EDUCATION/TRAINING PROGRAM
Payer: MEDICAID

## 2023-08-23 LAB
ANION GAP SERPL CALC-SCNC: 10 MMOL/L (ref 7–16)
BASOPHILS # BLD AUTO: 0.3 % (ref 0–1.8)
BASOPHILS # BLD: 0.02 K/UL (ref 0–0.12)
BUN SERPL-MCNC: 5 MG/DL (ref 8–22)
CALCIUM SERPL-MCNC: 8.9 MG/DL (ref 8.5–10.5)
CHLORIDE SERPL-SCNC: 96 MMOL/L (ref 96–112)
CO2 SERPL-SCNC: 21 MMOL/L (ref 20–33)
CREAT SERPL-MCNC: 0.6 MG/DL (ref 0.5–1.4)
EOSINOPHIL # BLD AUTO: 0.03 K/UL (ref 0–0.51)
EOSINOPHIL NFR BLD: 0.5 % (ref 0–6.9)
ERYTHROCYTE [DISTWIDTH] IN BLOOD BY AUTOMATED COUNT: 43.8 FL (ref 35.9–50)
GFR SERPLBLD CREATININE-BSD FMLA CKD-EPI: 114 ML/MIN/1.73 M 2
GLUCOSE BLD STRIP.AUTO-MCNC: 137 MG/DL (ref 65–99)
GLUCOSE BLD STRIP.AUTO-MCNC: 174 MG/DL (ref 65–99)
GLUCOSE BLD STRIP.AUTO-MCNC: 186 MG/DL (ref 65–99)
GLUCOSE BLD STRIP.AUTO-MCNC: 187 MG/DL (ref 65–99)
GLUCOSE SERPL-MCNC: 166 MG/DL (ref 65–99)
HCT VFR BLD AUTO: 34.5 % (ref 37–47)
HGB BLD-MCNC: 11.5 G/DL (ref 12–16)
IMM GRANULOCYTES # BLD AUTO: 0.03 K/UL (ref 0–0.11)
IMM GRANULOCYTES NFR BLD AUTO: 0.5 % (ref 0–0.9)
LYMPHOCYTES # BLD AUTO: 0.95 K/UL (ref 1–4.8)
LYMPHOCYTES NFR BLD: 15.4 % (ref 22–41)
MAGNESIUM SERPL-MCNC: 1.2 MG/DL (ref 1.5–2.5)
MCH RBC QN AUTO: 27 PG (ref 27–33)
MCHC RBC AUTO-ENTMCNC: 33.3 G/DL (ref 32.2–35.5)
MCV RBC AUTO: 81 FL (ref 81.4–97.8)
MONOCYTES # BLD AUTO: 0.71 K/UL (ref 0–0.85)
MONOCYTES NFR BLD AUTO: 11.5 % (ref 0–13.4)
NEUTROPHILS # BLD AUTO: 4.41 K/UL (ref 1.82–7.42)
NEUTROPHILS NFR BLD: 71.8 % (ref 44–72)
NRBC # BLD AUTO: 0 K/UL
NRBC BLD-RTO: 0 /100 WBC (ref 0–0.2)
PLATELET # BLD AUTO: 156 K/UL (ref 164–446)
PMV BLD AUTO: 11.7 FL (ref 9–12.9)
POTASSIUM SERPL-SCNC: 3.3 MMOL/L (ref 3.6–5.5)
RBC # BLD AUTO: 4.26 M/UL (ref 4.2–5.4)
SCCMEC + MECA PNL NOSE NAA+PROBE: POSITIVE
SODIUM SERPL-SCNC: 127 MMOL/L (ref 135–145)
WBC # BLD AUTO: 6.2 K/UL (ref 4.8–10.8)

## 2023-08-23 PROCEDURE — 99232 SBSQ HOSP IP/OBS MODERATE 35: CPT | Performed by: INTERNAL MEDICINE

## 2023-08-23 PROCEDURE — 700105 HCHG RX REV CODE 258: Performed by: STUDENT IN AN ORGANIZED HEALTH CARE EDUCATION/TRAINING PROGRAM

## 2023-08-23 PROCEDURE — 700111 HCHG RX REV CODE 636 W/ 250 OVERRIDE (IP): Mod: JZ | Performed by: INTERNAL MEDICINE

## 2023-08-23 PROCEDURE — 36415 COLL VENOUS BLD VENIPUNCTURE: CPT

## 2023-08-23 PROCEDURE — 700111 HCHG RX REV CODE 636 W/ 250 OVERRIDE (IP): Mod: JZ

## 2023-08-23 PROCEDURE — A9270 NON-COVERED ITEM OR SERVICE: HCPCS | Performed by: STUDENT IN AN ORGANIZED HEALTH CARE EDUCATION/TRAINING PROGRAM

## 2023-08-23 PROCEDURE — 770004 HCHG ROOM/CARE - ONCOLOGY PRIVATE *

## 2023-08-23 PROCEDURE — A9270 NON-COVERED ITEM OR SERVICE: HCPCS

## 2023-08-23 PROCEDURE — 700102 HCHG RX REV CODE 250 W/ 637 OVERRIDE(OP)

## 2023-08-23 PROCEDURE — 82962 GLUCOSE BLOOD TEST: CPT | Mod: 91

## 2023-08-23 PROCEDURE — 83735 ASSAY OF MAGNESIUM: CPT

## 2023-08-23 PROCEDURE — 700102 HCHG RX REV CODE 250 W/ 637 OVERRIDE(OP): Performed by: STUDENT IN AN ORGANIZED HEALTH CARE EDUCATION/TRAINING PROGRAM

## 2023-08-23 PROCEDURE — 85025 COMPLETE CBC W/AUTO DIFF WBC: CPT

## 2023-08-23 PROCEDURE — 93971 EXTREMITY STUDY: CPT | Mod: LT

## 2023-08-23 PROCEDURE — 700111 HCHG RX REV CODE 636 W/ 250 OVERRIDE (IP): Mod: JZ | Performed by: STUDENT IN AN ORGANIZED HEALTH CARE EDUCATION/TRAINING PROGRAM

## 2023-08-23 PROCEDURE — 80048 BASIC METABOLIC PNL TOTAL CA: CPT

## 2023-08-23 RX ORDER — POTASSIUM CHLORIDE 7.45 MG/ML
10 INJECTION INTRAVENOUS
Status: DISPENSED | OUTPATIENT
Start: 2023-08-23 | End: 2023-08-23

## 2023-08-23 RX ORDER — OXYCODONE HYDROCHLORIDE 5 MG/1
2.5 TABLET ORAL
Status: DISCONTINUED | OUTPATIENT
Start: 2023-08-23 | End: 2023-08-24 | Stop reason: HOSPADM

## 2023-08-23 RX ORDER — MORPHINE SULFATE 4 MG/ML
2 INJECTION INTRAVENOUS
Status: DISCONTINUED | OUTPATIENT
Start: 2023-08-23 | End: 2023-08-24 | Stop reason: HOSPADM

## 2023-08-23 RX ORDER — MAGNESIUM SULFATE HEPTAHYDRATE 40 MG/ML
2 INJECTION, SOLUTION INTRAVENOUS ONCE
Status: COMPLETED | OUTPATIENT
Start: 2023-08-23 | End: 2023-08-23

## 2023-08-23 RX ORDER — POTASSIUM CHLORIDE 7.45 MG/ML
10 INJECTION INTRAVENOUS ONCE
Status: COMPLETED | OUTPATIENT
Start: 2023-08-23 | End: 2023-08-23

## 2023-08-23 RX ORDER — OXYCODONE HYDROCHLORIDE 5 MG/1
5 TABLET ORAL
Status: DISCONTINUED | OUTPATIENT
Start: 2023-08-23 | End: 2023-08-24 | Stop reason: HOSPADM

## 2023-08-23 RX ADMIN — AMPICILLIN AND SULBACTAM 3 G: 1; 2 INJECTION, POWDER, FOR SOLUTION INTRAMUSCULAR; INTRAVENOUS at 12:05

## 2023-08-23 RX ADMIN — AMPICILLIN AND SULBACTAM 3 G: 1; 2 INJECTION, POWDER, FOR SOLUTION INTRAMUSCULAR; INTRAVENOUS at 17:23

## 2023-08-23 RX ADMIN — INSULIN HUMAN 3 UNITS: 100 INJECTION, SOLUTION PARENTERAL at 12:12

## 2023-08-23 RX ADMIN — INSULIN HUMAN 3 UNITS: 100 INJECTION, SOLUTION PARENTERAL at 06:08

## 2023-08-23 RX ADMIN — INSULIN HUMAN 3 UNITS: 100 INJECTION, SOLUTION PARENTERAL at 17:34

## 2023-08-23 RX ADMIN — POTASSIUM CHLORIDE 10 MEQ: 7.46 INJECTION, SOLUTION INTRAVENOUS at 13:34

## 2023-08-23 RX ADMIN — OXYCODONE HYDROCHLORIDE 5 MG: 5 TABLET ORAL at 20:03

## 2023-08-23 RX ADMIN — POTASSIUM CHLORIDE 10 MEQ: 7.46 INJECTION, SOLUTION INTRAVENOUS at 10:28

## 2023-08-23 RX ADMIN — MORPHINE SULFATE 2 MG: 4 INJECTION, SOLUTION INTRAMUSCULAR; INTRAVENOUS at 10:16

## 2023-08-23 RX ADMIN — MORPHINE SULFATE 2 MG: 4 INJECTION, SOLUTION INTRAMUSCULAR; INTRAVENOUS at 21:09

## 2023-08-23 RX ADMIN — ENOXAPARIN SODIUM 40 MG: 100 INJECTION SUBCUTANEOUS at 17:22

## 2023-08-23 RX ADMIN — OXYCODONE HYDROCHLORIDE 5 MG: 5 TABLET ORAL at 02:56

## 2023-08-23 RX ADMIN — POTASSIUM CHLORIDE 10 MEQ: 7.46 INJECTION, SOLUTION INTRAVENOUS at 15:30

## 2023-08-23 RX ADMIN — ACETAMINOPHEN 650 MG: 325 TABLET, FILM COATED ORAL at 00:13

## 2023-08-23 RX ADMIN — AMPICILLIN AND SULBACTAM 3 G: 1; 2 INJECTION, POWDER, FOR SOLUTION INTRAMUSCULAR; INTRAVENOUS at 00:19

## 2023-08-23 RX ADMIN — POTASSIUM CHLORIDE 10 MEQ: 7.46 INJECTION, SOLUTION INTRAVENOUS at 20:18

## 2023-08-23 RX ADMIN — AMPICILLIN AND SULBACTAM 3 G: 1; 2 INJECTION, POWDER, FOR SOLUTION INTRAMUSCULAR; INTRAVENOUS at 09:30

## 2023-08-23 RX ADMIN — OXYCODONE HYDROCHLORIDE 5 MG: 5 TABLET ORAL at 06:12

## 2023-08-23 RX ADMIN — MAGNESIUM SULFATE HEPTAHYDRATE 2 G: 2 INJECTION, SOLUTION INTRAVENOUS at 20:23

## 2023-08-23 RX ADMIN — MORPHINE SULFATE 2 MG: 4 INJECTION, SOLUTION INTRAMUSCULAR; INTRAVENOUS at 14:55

## 2023-08-23 ASSESSMENT — ENCOUNTER SYMPTOMS
RESPIRATORY NEGATIVE: 1
NEUROLOGICAL NEGATIVE: 1
MUSCULOSKELETAL NEGATIVE: 1
CARDIOVASCULAR NEGATIVE: 1
EYES NEGATIVE: 1
GASTROINTESTINAL NEGATIVE: 1

## 2023-08-23 ASSESSMENT — PAIN DESCRIPTION - PAIN TYPE
TYPE: ACUTE PAIN
TYPE: ACUTE PAIN

## 2023-08-23 NOTE — DOCUMENTATION QUERY
[facility]                                                                       Query Response Note      PATIENT:               DARREN RON  ACCT #:                  [id number]  MRN:                     [id number]  :                      1980  ADMIT DATE:       2023 12:56 AM  DISCH DATE:          RESPONDING  PROVIDER #:        [id number]           QUERY TEXT:    Documentation in the medical record indicates that this patient has a diagnosis of Diabetes Type 2 with an additional diagnosis of cellulitis of LLE.     Can you provide clarification if the above clinical findings are related or not related?    The patient's Clinical Indicators include:  Findings:  --Patient admitted for Cellulitis of LLE and SIRS  --Per H&P, ''Type 2 diabetes mellitus with hyperglycemia'' documented  --Per H&P, ''Cellulitis LLE'' documented  --Glucose on admission 08:  212    Treatment:  --IV Unasyn 3g in NS 100ml IVPB every 6 hours  --Sliding scale insulin/accu checks/hypoglycemia protocol  --Pain control    Risk Factors:  --DM2 with hyperglycemia  --SIRS  --Noncompliance with other medical treatment    Thank you,  Dana Saxena RN, BSN  Clinical   Connect via [employer]  Options provided:   -- Diabetes and LLE cellulitis are related to each other   -- Diabetes and LLE cellulitis are not related to each other   -- Other explanation, Please specify   -- Unable to determine      Query created by: Dana Saxena on 2023 7:48 AM    RESPONSE TEXT:    Diabetes and LLE cellulitis are not related to each other          Electronically signed by:  KATYA TAPIA MD 2023 7:06 AM

## 2023-08-23 NOTE — PROGRESS NOTES
[facility] Medicine Daily Progress Note    Date of Service  8/23/2023    Chief Complaint  Anabell Rees is a 42 y.o. homeless female admitted 8/22/2023 with left leg swelling, redness, and pain. She suspects that she may have been bitten by a spider. She is medically non-compliant, and has a history of type 2 diabetes and tobacco dependence.    Hospital Course  Admitted to medical floor.  She was febrile (100.5), tachycardic (116), blood pressure was stable.  Respiratory rate was up to 21.  Unasyn was empirically started.  IV fluids started.  Sodium is 126, glucose was 212, WBCs was 7.6.    Chest x-ray from 8/22/2023 showed no acute abnormalities.      Interval Problem Update  Afebrile, hemodynamically stable.  Sodium is 127, potassium is 3.3,  Lower extremity ultrasound is pending.  Blood cultures are pending.  MRSA nares is pending    I have discussed this patient's plan of care and discharge plan at IDT rounds today with Case Management, Nursing, Nursing leadership, and other members of the IDT team.    Consultants/Specialty  None    Code Status  Full Code    Disposition  The patient is not medically cleared for discharge to home or a post-acute facility.  Anticipate discharge to: home with close outpatient follow-up    I have placed the appropriate orders for post-discharge needs.    Review of Systems  Review of Systems   Constitutional:  Positive for malaise/fatigue.   HENT: Negative.     Eyes: Negative.    Respiratory: Negative.     Cardiovascular: Negative.    Gastrointestinal: Negative.    Genitourinary: Negative.    Musculoskeletal: Negative.    Skin: Negative.    Neurological: Negative.    Endo/Heme/Allergies: Negative.         Physical Exam  Temp:  [36.4 °C (97.5 °F)-36.9 °C (98.4 °F)] 36.4 °C (97.6 °F)  Pulse:  [] 89  Resp:  [16-18] 18  BP: (125-147)/(71-78) 139/72  SpO2:  [93 %-97 %] 93 %    Physical Exam  Constitutional:       Appearance: She is obese. She is ill-appearing.   HENT:      Head:  Normocephalic.      Mouth/Throat:      Mouth: Mucous membranes are moist.   Eyes:      Pupils: Pupils are equal, round, and reactive to light.   Cardiovascular:      Rate and Rhythm: Normal rate.   Pulmonary:      Effort: Pulmonary effort is normal.   Abdominal:      Palpations: Abdomen is soft.   Musculoskeletal:      Cervical back: Normal range of motion.      Left lower leg: Edema present.   Skin:     General: Skin is warm.      Findings: Erythema and lesion present.      Comments: Lesion on left lower extremity   Neurological:      Mental Status: She is alert.   Psychiatric:         Mood and Affect: Mood normal.         Fluids    Intake/Output Summary (Last 24 hours) at 8/23/2023 0844  Last data filed at 8/22/2023 1400  Gross per 24 hour   Intake 240 ml   Output --   Net 240 ml       Laboratory  Recent Labs     08/22/23  0219 08/23/23  0610   WBC 7.6 6.2   RBC 4.56 4.26   HEMOGLOBIN 12.4 11.5*   HEMATOCRIT 37.7 34.5*   MCV 82.7 81.0*   MCH 27.2 27.0   MCHC 32.9 33.3   RDW 43.3 43.8   PLATELETCT 148* 156*   MPV 12.4 11.7       Recent Labs     08/22/23  0219 08/22/23  0757 08/23/23  0610   SODIUM 126* 127* 127*   POTASSIUM 3.8  --  3.3*   CHLORIDE 93*  --  96   CO2 21  --  21   GLUCOSE 212*  --  166*   BUN 9  --  5*   CREATININE 0.68  --  0.60   CALCIUM 9.3  --  8.9                     Imaging  IR-US GUIDED PIV   Final Result    Ultrasound-guided PERIPHERAL IV INSERTION performed by    qualified nursing staff as above.      DX-CHEST-PORTABLE (1 VIEW)   Final Result         1.  Linear density in the right midlung favoring scarring, corresponding with previously visualized area of infiltrate. No focal infiltrate identified.      US-EXTREMITY VENOUS LOWER UNILAT LEFT    (Results Pending)          Assessment/Plan  * Cellulitis- (present on admission)  Assessment & Plan  Suspected spider/insect bite. On IV Unasyn.  Cultures are pending.  Pain control as needed    Tobacco abuse- (present on admission)  Assessment &  Plan  Patient declines nicotine replacement.  Continue to  on cessation.    Hyponatremia- (present on admission)  Assessment & Plan  History of a history of hyponatremia.  Continue to cautiously correct.    Type 2 diabetes mellitus with hyperglycemia, without long-term current use of insulin (HCC)- (present on admission)  Assessment & Plan  History of medical noncompliance.  Hemoglobin A1c was 10.1 on 4/17/2023, repeat ordered.  Continue SSI with Accu-Cheks and hypoglycemia protocol.    Morbid obesity (HCC)  Assessment & Plan  Recommend outpatient follow-up for weight management and control.         VTE prophylaxis:    enoxaparin ppx

## 2023-08-23 NOTE — CARE PLAN
The patient is Watcher - Medium risk of patient condition declining or worsening    Shift Goals  Clinical Goals: IV abx  Patient Goals: pain control    Progress made toward(s) clinical / shift goals:       Patient has had PRN medication as ordered. IV antibiotics given as ordered.       Problem: Knowledge Deficit - Standard  Goal: Patient and family/care givers will demonstrate understanding of plan of care, disease process/condition, diagnostic tests and medications  Outcome: Progressing     Problem: Pain - Standard  Goal: Alleviation of pain or a reduction in pain to the patient’s comfort goal  Outcome: Progressing       Patient is not progressing towards the following goals:

## 2023-08-24 ENCOUNTER — PHARMACY VISIT (OUTPATIENT)
Dept: PHARMACY | Facility: MEDICAL CENTER | Age: 43
End: 2023-08-24
Payer: COMMERCIAL

## 2023-08-24 VITALS
DIASTOLIC BLOOD PRESSURE: 76 MMHG | HEART RATE: 93 BPM | WEIGHT: 257.5 LBS | TEMPERATURE: 99.2 F | HEIGHT: 68 IN | OXYGEN SATURATION: 94 % | BODY MASS INDEX: 39.03 KG/M2 | RESPIRATION RATE: 15 BRPM | SYSTOLIC BLOOD PRESSURE: 137 MMHG

## 2023-08-24 PROBLEM — E87.1 HYPONATREMIA: Status: RESOLVED | Noted: 2021-08-23 | Resolved: 2023-08-24

## 2023-08-24 LAB
ANION GAP SERPL CALC-SCNC: 10 MMOL/L (ref 7–16)
BACTERIA UR CULT: NORMAL
BASOPHILS # BLD AUTO: 0.3 % (ref 0–1.8)
BASOPHILS # BLD: 0.02 K/UL (ref 0–0.12)
BUN SERPL-MCNC: 4 MG/DL (ref 8–22)
CALCIUM SERPL-MCNC: 9.4 MG/DL (ref 8.5–10.5)
CHLORIDE SERPL-SCNC: 94 MMOL/L (ref 96–112)
CO2 SERPL-SCNC: 27 MMOL/L (ref 20–33)
CREAT SERPL-MCNC: 0.57 MG/DL (ref 0.5–1.4)
EOSINOPHIL # BLD AUTO: 0.12 K/UL (ref 0–0.51)
EOSINOPHIL NFR BLD: 2 % (ref 0–6.9)
ERYTHROCYTE [DISTWIDTH] IN BLOOD BY AUTOMATED COUNT: 44.6 FL (ref 35.9–50)
GFR SERPLBLD CREATININE-BSD FMLA CKD-EPI: 116 ML/MIN/1.73 M 2
GLUCOSE BLD STRIP.AUTO-MCNC: 176 MG/DL (ref 65–99)
GLUCOSE BLD STRIP.AUTO-MCNC: 205 MG/DL (ref 65–99)
GLUCOSE BLD STRIP.AUTO-MCNC: 221 MG/DL (ref 65–99)
GLUCOSE SERPL-MCNC: 233 MG/DL (ref 65–99)
HCT VFR BLD AUTO: 35.6 % (ref 37–47)
HGB BLD-MCNC: 11.6 G/DL (ref 12–16)
IMM GRANULOCYTES # BLD AUTO: 0.06 K/UL (ref 0–0.11)
IMM GRANULOCYTES NFR BLD AUTO: 1 % (ref 0–0.9)
LYMPHOCYTES # BLD AUTO: 0.69 K/UL (ref 1–4.8)
LYMPHOCYTES NFR BLD: 11.2 % (ref 22–41)
MCH RBC QN AUTO: 26.8 PG (ref 27–33)
MCHC RBC AUTO-ENTMCNC: 32.6 G/DL (ref 32.2–35.5)
MCV RBC AUTO: 82.2 FL (ref 81.4–97.8)
MONOCYTES # BLD AUTO: 0.74 K/UL (ref 0–0.85)
MONOCYTES NFR BLD AUTO: 12.1 % (ref 0–13.4)
NEUTROPHILS # BLD AUTO: 4.51 K/UL (ref 1.82–7.42)
NEUTROPHILS NFR BLD: 73.4 % (ref 44–72)
NRBC # BLD AUTO: 0 K/UL
NRBC BLD-RTO: 0 /100 WBC (ref 0–0.2)
PLATELET # BLD AUTO: 169 K/UL (ref 164–446)
PMV BLD AUTO: 10.9 FL (ref 9–12.9)
POTASSIUM SERPL-SCNC: 4 MMOL/L (ref 3.6–5.5)
RBC # BLD AUTO: 4.33 M/UL (ref 4.2–5.4)
SIGNIFICANT IND 70042: NORMAL
SITE SITE: NORMAL
SODIUM SERPL-SCNC: 131 MMOL/L (ref 135–145)
SOURCE SOURCE: NORMAL
WBC # BLD AUTO: 6.1 K/UL (ref 4.8–10.8)

## 2023-08-24 PROCEDURE — 82962 GLUCOSE BLOOD TEST: CPT | Mod: 91

## 2023-08-24 PROCEDURE — RXMED WILLOW AMBULATORY MEDICATION CHARGE: Performed by: INTERNAL MEDICINE

## 2023-08-24 PROCEDURE — A9270 NON-COVERED ITEM OR SERVICE: HCPCS

## 2023-08-24 PROCEDURE — 700111 HCHG RX REV CODE 636 W/ 250 OVERRIDE (IP): Mod: JZ | Performed by: STUDENT IN AN ORGANIZED HEALTH CARE EDUCATION/TRAINING PROGRAM

## 2023-08-24 PROCEDURE — 700105 HCHG RX REV CODE 258: Performed by: STUDENT IN AN ORGANIZED HEALTH CARE EDUCATION/TRAINING PROGRAM

## 2023-08-24 PROCEDURE — A9270 NON-COVERED ITEM OR SERVICE: HCPCS | Performed by: INTERNAL MEDICINE

## 2023-08-24 PROCEDURE — 99239 HOSP IP/OBS DSCHRG MGMT >30: CPT | Performed by: INTERNAL MEDICINE

## 2023-08-24 PROCEDURE — 700102 HCHG RX REV CODE 250 W/ 637 OVERRIDE(OP)

## 2023-08-24 PROCEDURE — 36415 COLL VENOUS BLD VENIPUNCTURE: CPT

## 2023-08-24 PROCEDURE — 85025 COMPLETE CBC W/AUTO DIFF WBC: CPT

## 2023-08-24 PROCEDURE — 700111 HCHG RX REV CODE 636 W/ 250 OVERRIDE (IP): Mod: JZ | Performed by: INTERNAL MEDICINE

## 2023-08-24 PROCEDURE — 700102 HCHG RX REV CODE 250 W/ 637 OVERRIDE(OP): Performed by: INTERNAL MEDICINE

## 2023-08-24 PROCEDURE — 80048 BASIC METABOLIC PNL TOTAL CA: CPT

## 2023-08-24 RX ORDER — POTASSIUM CHLORIDE 7.45 MG/ML
10 INJECTION INTRAVENOUS
Status: DISPENSED | OUTPATIENT
Start: 2023-08-24 | End: 2023-08-24

## 2023-08-24 RX ORDER — LINEZOLID 600 MG/1
600 TABLET, FILM COATED ORAL EVERY 12 HOURS
Status: DISCONTINUED | OUTPATIENT
Start: 2023-08-24 | End: 2023-08-24 | Stop reason: HOSPADM

## 2023-08-24 RX ORDER — LINEZOLID 600 MG/1
600 TABLET, FILM COATED ORAL EVERY 12 HOURS
Qty: 14 TABLET | Refills: 0 | Status: ACTIVE | OUTPATIENT
Start: 2023-08-24 | End: 2023-08-31

## 2023-08-24 RX ORDER — MAGNESIUM SULFATE HEPTAHYDRATE 40 MG/ML
2 INJECTION, SOLUTION INTRAVENOUS ONCE
Status: COMPLETED | OUTPATIENT
Start: 2023-08-24 | End: 2023-08-24

## 2023-08-24 RX ORDER — POTASSIUM CHLORIDE 7.45 MG/ML
10 INJECTION INTRAVENOUS ONCE
Status: COMPLETED | OUTPATIENT
Start: 2023-08-24 | End: 2023-08-24

## 2023-08-24 RX ADMIN — POTASSIUM CHLORIDE 10 MEQ: 7.46 INJECTION, SOLUTION INTRAVENOUS at 11:43

## 2023-08-24 RX ADMIN — POTASSIUM CHLORIDE 10 MEQ: 7.46 INJECTION, SOLUTION INTRAVENOUS at 10:22

## 2023-08-24 RX ADMIN — OXYCODONE HYDROCHLORIDE 5 MG: 5 TABLET ORAL at 00:26

## 2023-08-24 RX ADMIN — INSULIN HUMAN 6 UNITS: 100 INJECTION, SOLUTION PARENTERAL at 12:41

## 2023-08-24 RX ADMIN — LINEZOLID 600 MG: 600 TABLET, FILM COATED ORAL at 11:45

## 2023-08-24 RX ADMIN — POTASSIUM CHLORIDE 10 MEQ: 7.46 INJECTION, SOLUTION INTRAVENOUS at 14:40

## 2023-08-24 RX ADMIN — AMPICILLIN AND SULBACTAM 3 G: 1; 2 INJECTION, POWDER, FOR SOLUTION INTRAMUSCULAR; INTRAVENOUS at 05:29

## 2023-08-24 RX ADMIN — MAGNESIUM SULFATE HEPTAHYDRATE 2 G: 2 INJECTION, SOLUTION INTRAVENOUS at 10:32

## 2023-08-24 RX ADMIN — AMPICILLIN AND SULBACTAM 3 G: 1; 2 INJECTION, POWDER, FOR SOLUTION INTRAMUSCULAR; INTRAVENOUS at 00:19

## 2023-08-24 RX ADMIN — POTASSIUM CHLORIDE 10 MEQ: 7.46 INJECTION, SOLUTION INTRAVENOUS at 13:00

## 2023-08-24 RX ADMIN — INSULIN HUMAN 6 UNITS: 100 INJECTION, SOLUTION PARENTERAL at 00:23

## 2023-08-24 RX ADMIN — OXYCODONE HYDROCHLORIDE 5 MG: 5 TABLET ORAL at 05:28

## 2023-08-24 RX ADMIN — INSULIN HUMAN 3 UNITS: 100 INJECTION, SOLUTION PARENTERAL at 05:33

## 2023-08-24 ASSESSMENT — PAIN DESCRIPTION - PAIN TYPE: TYPE: ACUTE PAIN

## 2023-08-24 NOTE — DISCHARGE PLANNING
Case Management:     Received phone call from Gabe Mcge[facility]. This is a Medicaid HPN funded home that assist in finding transitional housing to their patients. Spoke with pt to see if she was interested in going to live at this house, discussed she need to be able to complete her own IADLs and ADLs as they do not have anyone to assist with any medical needs and stated she will need to be able to get up a flight of stairs without any assistance. Pt stated she will try and would like to dc to this home. Ordered pt crutches to assist in her getting up and down the stairs at the home since she reports her leg hurts when she applies pressure.     Submitted rideline request via [employer] benefits. Transport request for 5pm to [address] [facility] [place] [address].    Sabi- farnaz#[phone]

## 2023-08-24 NOTE — DISCHARGE SUMMARY
Discharge Summary    CHIEF COMPLAINT ON ADMISSION  Chief Complaint   Patient presents with    Fever    Wound Infection       Reason for Admission  L leg pain     Admission Date  8/22/2023    CODE STATUS  Full Code    HPI & HOSPITAL COURSE  Anabell Rees is a 42 y.o. homeless female admitted 8/22/2023 with left leg swelling, redness, and pain. She suspects that she may have been bitten by a spider. She is medically non-compliant, and has a history of type 2 diabetes and tobacco dependence.     Admitted to medical floor.  She was febrile (100.5), tachycardic (116), blood pressure was stable.  Respiratory rate was up to 21.  Unasyn was empirically started.  IV fluids started.  Sodium was 126, glucose was 212, WBCs were 7.6.     Chest x-ray from 8/22/2023 showed no acute abnormalities.       MRSA nares was positive.  Blood cultures have been negative. She has had no open wounds or drainage.  Lower extremity Dopplers were negative for DVT.  Patient has been advised to complete antibiotic course with Zyvox for 7 more days (total 10-day antibiotic course) and follow-up with her primary care physician within 1 week.     Potassium and magnesium have been low, being replaced prior to discharge.  She has been advised to follow-up with her primary care physician to monitor electrolytes and for diabetes control in 1 to 2 days.    Patient has been advised to follow-up with her outpatient primary care physician within 1 to 2 days for blood glucose control.     Therefore, she is discharged in fair and stable condition to home with close outpatient follow-up.        Discharge Date  8/24/2023    FOLLOW UP ITEMS POST DISCHARGE  PCP in 1-2 days  Dentist in 1 to 2 days    DISCHARGE DIAGNOSES  Principal Problem:    Cellulitis (POA: Yes)  Active Problems:    Type 2 diabetes mellitus with hyperglycemia, without long-term current use of insulin (HCC) (POA: Yes)    Tobacco abuse (POA: Yes)    Morbid obesity (HCC) (POA: Unknown)  Resolved  Problems:    Hyponatremia (POA: Yes)      FOLLOW UP  No future appointments.  No follow-up provider specified.    MEDICATIONS ON DISCHARGE     Medication List        START taking these medications        Instructions   linezolid 600 MG Tabs  Commonly known as: Zyvox   Take 1 Tablet by mouth every 12 hours for 7 days.  Dose: 600 mg            CONTINUE taking these medications        Instructions   ibuprofen 200 MG Tabs  Commonly known as: Motrin   Take 400 mg by mouth every 6 hours as needed for Inflammation. Indications: Pain  Dose: 400 mg              Allergies  Allergies   Allergen Reactions    Hydrocodone Vomiting and Nausea       DIET  Orders Placed This Encounter   Procedures    Diet Order Diet: Consistent CHO (Diabetic)     Standing Status:   Standing     Number of Occurrences:   1     Order Specific Question:   Diet:     Answer:   Consistent CHO (Diabetic) [4]       ACTIVITY  As tolerated.      CONSULTATIONS  N/A    PROCEDURES  N/A    LABORATORY  Lab Results   Component Value Date    SODIUM 127 (L) 08/23/2023    POTASSIUM 3.3 (L) 08/23/2023    CHLORIDE 96 08/23/2023    CO2 21 08/23/2023    GLUCOSE 166 (H) 08/23/2023    BUN 5 (L) 08/23/2023    CREATININE 0.60 08/23/2023        Lab Results   Component Value Date    WBC 6.2 08/23/2023    HEMOGLOBIN 11.5 (L) 08/23/2023    HEMATOCRIT 34.5 (L) 08/23/2023    PLATELETCT 156 (L) 08/23/2023        Total time of the discharge process exceeds 36 minutes.

## 2023-08-24 NOTE — CARE PLAN
The patient is Watcher - Medium risk of patient condition declining or worsening    Shift Goals  Clinical Goals: electrolytes replacement, iv abx  Patient Goals: pain control    Progress made toward(s) clinical / shift goals:    Problem: Knowledge Deficit - Standard  Goal: Patient and family/care givers will demonstrate understanding of plan of care, disease process/condition, diagnostic tests and medications  Outcome: Progressing     Problem: Pain - Standard  Goal: Alleviation of pain or a reduction in pain to the patient’s comfort goal  Outcome: Progressing     Problem: Fall Risk  Goal: Patient will remain free from falls  Outcome: Progressing       Patient is not progressing towards the following goals:

## 2023-08-24 NOTE — CARE PLAN
The patient is Stable - Low risk of patient condition declining or worsening    Shift Goals  Clinical Goals: electrolyte replacment, discharge  Patient Goals: discharge    Progress made toward(s) clinical / shift goals:    Problem: Fluid Volume  Goal: Fluid volume balance will be maintained  Outcome: Progressing  Note: Pt received magnesium and will soon complete potasium. Pt tolerating the potasium.     Problem: Fall Risk  Goal: Patient will remain free from falls  Outcome: Progressing  Note: Pt's bed locked and at the lowest position. Call light is within reach, personal belongings within reach.        Patient is not progressing towards the following goals:

## 2023-08-27 LAB
BACTERIA BLD CULT: NORMAL
BACTERIA BLD CULT: NORMAL
SIGNIFICANT IND 70042: NORMAL
SIGNIFICANT IND 70042: NORMAL
SITE SITE: NORMAL
SITE SITE: NORMAL
SOURCE SOURCE: NORMAL
SOURCE SOURCE: NORMAL

## 2024-05-10 NOTE — H&P
Hospital Medicine History & Physical Note    Date of Service  8/23/2021    Primary Care Physician  Pcp Pt States None    Consultants  None    Specialist Names: None    Code Status  Full Code    Chief Complaint  Chief Complaint   Patient presents with   • Foot Swelling     Pt left ankle redness and swelling for 2 days.  3+ edema on Left medial ankle.        History of Presenting Illness  Jc Ramos is a 40 y.o. female with diabetes.  Who presented 8/23/2021 with left foot pain.  Patient reports that she woke up on Friday and she noticed that she was having left foot pain and redness.  She reports since that time her pain has significantly gotten worse and the swelling has extended wear is no longer just located around the ankle but also to her foot as well.  Is associated with severe pain that is worse on palpation and standing.  She states the pain is so severe that she is no longer able to ambulate.  She additionally reported possible fevers yesterday.  She denies any obvious aggravating relieving factors.  Additionally, patient reporting dysuria.  She reports that she was recently diagnosed with chlamydia and gonorrhea at urgent care center.    ED: -44.  CBC unremarkable, , glucose 142, lactic acid 1.3.  Left foot x-ray with soft tissue swelling and possible subcutaneous air medial aspect of left ankle.  Patient was given doxycycline, Rocephin, morphine 4 mg, Zofran 4 mg.      I discussed the plan of care with patient.    Review of Systems  Review of Systems   Constitutional: Positive for fever. Negative for chills and weight loss.   HENT: Negative for hearing loss and sore throat.    Eyes: Negative for blurred vision and pain.   Respiratory: Negative for cough, hemoptysis, shortness of breath and wheezing.    Cardiovascular: Positive for leg swelling (Left foot swelling). Negative for chest pain, palpitations and orthopnea.   Gastrointestinal: Negative for abdominal pain, blood in stool,  Medication: triamcinolone  LOV: 1/18/24  Last refill: 3/23/23 (1 refills)  Next OV: none    Medication details:  Pruritus  -     triamcinolone (ARISTOCORT) 0.1 % cream; Apply topically 2 times daily. To itchy/scaly areas on the abdomen as needed     Xerosis of skin  -     triamcinolone (ARISTOCORT) 0.1 % cream; Apply topically 2 times daily. To itchy/scaly areas on the abdomen as needed  _____________________      Will refill per protocol.   constipation, diarrhea, nausea and vomiting.   Genitourinary: Negative for dysuria and hematuria.   Musculoskeletal: Positive for joint pain (Left ankle pain). Negative for myalgias.   Skin: Negative for rash.        Erythema left foot   Neurological: Negative for dizziness, loss of consciousness and weakness.       Past Medical History   has a past medical history of Diabetes.    Surgical History   has a past surgical history that includes cholecystectomy and pr c-sec only,prev c-sec.     Family History  family history includes Diabetes in her mother.   Family history reviewed with patient. There is family history that is pertinent to the chief complaint.     Social History   reports that she has been smoking cigarettes. She has been smoking about 0.50 packs per day. She has never used smokeless tobacco. She reports that she does not drink alcohol and does not use drugs.    Allergies  Allergies   Allergen Reactions   • Vicodin [Hydrocodone-Acetaminophen] Vomiting       Medications  None       Physical Exam  Temp:  [35.8 °C (96.5 °F)] 35.8 °C (96.5 °F)  Pulse:  [] 44  Resp:  [16] 16  BP: (134-144)/(67-91) 134/67  SpO2:  [93 %-99 %] 93 %    Physical Exam  Vitals and nursing note reviewed.   Constitutional:       General: She is not in acute distress.     Appearance: Normal appearance. She is not ill-appearing.   HENT:      Mouth/Throat:      Mouth: Mucous membranes are moist.   Eyes:      Extraocular Movements: Extraocular movements intact.   Cardiovascular:      Rate and Rhythm: Normal rate and regular rhythm.      Pulses: Normal pulses.      Heart sounds: Normal heart sounds. No murmur heard.   No gallop.    Pulmonary:      Effort: Pulmonary effort is normal. No respiratory distress.      Breath sounds: No wheezing, rhonchi or rales.   Abdominal:      General: Abdomen is flat. Bowel sounds are normal.      Palpations: Abdomen is soft.      Tenderness: There is no abdominal tenderness.   Musculoskeletal:          General: Tenderness (Left ankle) present. No deformity. Normal range of motion.      Right lower leg: No edema.      Left lower leg: Edema (Left ankle swelling) present.   Skin:     General: Skin is warm and dry.      Coloration: Skin is not jaundiced.      Findings: Erythema (Erthemamostly located left ankle, as well as possible small abscess noted.  No obvious drainage.) present. No rash.   Neurological:      General: No focal deficit present.      Mental Status: She is alert and oriented to person, place, and time.      Motor: No weakness.   Psychiatric:         Mood and Affect: Mood normal.         Laboratory:  Recent Labs     08/23/21  1705   WBC 6.6   RBC 4.57   HEMOGLOBIN 12.5   HEMATOCRIT 39.3   MCV 86.0   MCH 27.4   MCHC 31.8*   RDW 42.9   PLATELETCT 171   MPV 11.2     Recent Labs     08/23/21  1705   SODIUM 134*   POTASSIUM 3.8   CHLORIDE 96   CO2 26   GLUCOSE 142*   BUN 5*   CREATININE 0.58   CALCIUM 9.5     Recent Labs     08/23/21  1705   ALTSGPT 14   ASTSGOT 15   ALKPHOSPHAT 86   TBILIRUBIN 0.2   GLUCOSE 142*         No results for input(s): NTPROBNP in the last 72 hours.      No results for input(s): TROPONINT in the last 72 hours.    Imaging:  DX-FOOT-COMPLETE 3+ LEFT   Final Result         1.  No radiographic evidence of acute injury.      2. Soft tissue swelling and subcutaneous air noted in medial aspect of left ankle suspicious for subcutaneous infection or abscess.      CT-EXTREMITY, LOWER WITH LEFT    (Results Pending)       X-Ray:  I have personally reviewed the images and compared with prior images.  There does appear to be some possible air on personal review.    Assessment/Plan:  I anticipate this patient is appropriate for observation status at this time.    * Cellulitis- (present on admission)  Assessment & Plan  Worsening pain and erythema since Friday  Denies any prior wounds/injuries note location  Date reported incidence of fever yesterday  Does not meet sepsis criteria  S/p  doxycycline and Rocephin  -Continue with doxycycline and Rocephin  -Follow-up blood cultures  -CT LLE ordered for further evaluation of X-Ray Imaging As Patient Will Not Allow for Palpation during Physical Examination, and is unclear if there is actually air/crepitus in that location or possibly overlying wound with false reading.  -Analgesics for symptom management    UPDATE: CT results returned, does appear to have possible gas producing infection. There is a small 2-3cm overlying wound, but does not appear to be open at this time. I have changed abx therapy to Vanc/Zosyn/Clindamycin. Case was discussed w/ Dr. Khan (Ortho), will make pt NPO for now, and he will evaluate int he early AM.     Bradycardia- (present on admission)  Assessment & Plan  Possible documentation error  No bradycardia appreciated during physical exam  - monitor    Sexually transmitted disease  Assessment & Plan  Patient reports he recently went to urgent care was diagnosed with chlamydia and gonorrhea  Has not started antibiotics at this time  -Continue with doxycycline and Rocephin    Smoker- (present on admission)  Assessment & Plan  I counseled the patient for 7 minutes regarding smoking cessation using methods such as nicotine replacement therapy with patches and gum and medications such as Wellbutrin or Chantix.  I also discussed with pt breathing techniques and meditation as well as regular physical activity, which will assist pt  with smoking cessation.  Patient is agreeable to trial of nicotine replacement therapy.    Start nicotine replacement therapy with patches and gum.      Hyponatremia- (present on admission)  Assessment & Plan  Continue with IV fluids  Monitor please necessary    Type 2 diabetes mellitus with hyperglycemia, without long-term current use of insulin (HCC)- (present on admission)  Assessment & Plan  Not on medications at home  -A1c  -ISS    Abnormal x-ray- (present on admission)  Assessment & Plan  XR left foot  with soft tissue swelling and small amount of subcutaneous air left ankle  Attempted to palpate for further evaluation, but patient declines at this time secondary to pain  -Continue with above antibiotics  -We will order CT left foot for further evaluation and adjust as appropriately      VTE prophylaxis: enoxaparin ppx

## (undated) DEVICE — SENSOR SPO2 NEO LNCS ADHESIVE (20/BX) SEE USER NOTES

## (undated) DEVICE — SET EXTENSION WITH 2 PORTS (48EA/CA) ***PART #2C8610 IS A SUBSTITUTE*****

## (undated) DEVICE — SUTURE GENERAL

## (undated) DEVICE — GAUZE PACKING STRIP PLAIN STERILE - 1/2 IN X 5 YD (12/CS)

## (undated) DEVICE — CANISTER SUCTION 3000ML MECHANICAL FILTER AUTO SHUTOFF MEDI-VAC NONSTERILE LF DISP  (40EA/CA)

## (undated) DEVICE — HEAD HOLDER JUNIOR/ADULT

## (undated) DEVICE — GLOVE BIOGEL SZ 8 SURGICAL PF LTX - (50PR/BX 4BX/CA)

## (undated) DEVICE — TOWELS CLOTH SURGICAL - (4/PK 20PK/CA)

## (undated) DEVICE — GLOVE BIOGEL PI INDICATOR SZ 7.0 SURGICAL PF LF - (50/BX 4BX/CA)

## (undated) DEVICE — LACTATED RINGERS INJ 1000 ML - (14EA/CA 60CA/PF)

## (undated) DEVICE — SWAB CULTURE AMIES ESWAB (50EA/PK)

## (undated) DEVICE — NEPTUNE 4 PORT MANIFOLD - (20/PK)

## (undated) DEVICE — SLEEVE, VASO, THIGH, MED

## (undated) DEVICE — TOURNIQUET CUFF 34 X 4 ONE PORT DISP - STERILE (10/BX)

## (undated) DEVICE — TUBING CLEARLINK DUO-VENT - C-FLO (48EA/CA)

## (undated) DEVICE — GOWN WARMING STANDARD FLEX - (30/CA)

## (undated) DEVICE — SET LEADWIRE 5 LEAD BEDSIDE DISPOSABLE ECG (1SET OF 5/EA)

## (undated) DEVICE — PAD LAP STERILE 18 X 18 - (5/PK 40PK/CA)

## (undated) DEVICE — GLOVE SZ 6.5 BIOGEL PI MICRO - PF LF (50PR/BX)

## (undated) DEVICE — GLOVE BIOGEL INDICATOR SZ 8 SURGICAL PF LTX - (50/BX 4BX/CA)

## (undated) DEVICE — SUTURE 3-0 ETHILON PS-1 (36PK/BX)

## (undated) DEVICE — ELECTRODE 850 FOAM ADHESIVE - HYDROGEL RADIOTRNSPRNT (50/PK)

## (undated) DEVICE — SWAB ANAEROBIC SPEC.COLLECTOR - (25/PK 4PK/CA 100EA/CA)

## (undated) DEVICE — MASK ANESTHESIA ADULT  - (100/CA)

## (undated) DEVICE — GLOVE BIOGEL PI INDICATOR SZ 8.0 SURGICAL PF LF -(50/BX 4BX/CA)

## (undated) DEVICE — TOWEL STOP TIMEOUT SAFETY FLAG (40EA/CA)

## (undated) DEVICE — SUCTION INSTRUMENT YANKAUER BULBOUS TIP W/O VENT (50EA/CA)

## (undated) DEVICE — PACK LOWER EXTREMITY - (2/CA)

## (undated) DEVICE — BANDAGE ROLL STERILE BULKEE 4.5 IN X 4 YD (100EA/CA)

## (undated) DEVICE — GLOVE BIOGEL PI ORTHO SZ 7.5 PF LF (40PR/BX)

## (undated) DEVICE — PROTECTOR ULNA NERVE - (36PR/CA)

## (undated) DEVICE — GLOVE BIOGEL SZ 7 SURGICAL PF LTX - (50PR/BX 4BX/CA)

## (undated) DEVICE — DRAPE SURGICAL U 77X120 - (10/CA)

## (undated) DEVICE — SODIUM CHL IRRIGATION 0.9% 1000ML (12EA/CA)

## (undated) DEVICE — KIT ANESTHESIA W/CIRCUIT & 3/LT BAG W/FILTER (20EA/CA)

## (undated) DEVICE — GLOVE SZ 8 BIOGEL PI MICRO - PF LF (50PR/BX)